# Patient Record
Sex: MALE | Race: WHITE | Employment: OTHER | URBAN - METROPOLITAN AREA
[De-identification: names, ages, dates, MRNs, and addresses within clinical notes are randomized per-mention and may not be internally consistent; named-entity substitution may affect disease eponyms.]

---

## 2018-02-08 LAB
LEFT VENTRICULAR EJECTION FRACTION MODE: NORMAL
LV EF: 65 %

## 2018-02-23 PROBLEM — I48.0 PAF (PAROXYSMAL ATRIAL FIBRILLATION) (HCC): Status: ACTIVE | Noted: 2018-02-23

## 2018-02-23 PROBLEM — E66.09 CLASS 1 OBESITY DUE TO EXCESS CALORIES WITH SERIOUS COMORBIDITY AND BODY MASS INDEX (BMI) OF 32.0 TO 32.9 IN ADULT: Status: ACTIVE | Noted: 2018-02-23

## 2018-02-23 PROBLEM — E66.811 CLASS 1 OBESITY DUE TO EXCESS CALORIES WITH SERIOUS COMORBIDITY AND BODY MASS INDEX (BMI) OF 32.0 TO 32.9 IN ADULT: Status: ACTIVE | Noted: 2018-02-23

## 2018-02-23 PROBLEM — I44.1 AV BLOCK, MOBITZ 1: Status: ACTIVE | Noted: 2018-02-23

## 2018-03-12 DIAGNOSIS — I48.0 PAF (PAROXYSMAL ATRIAL FIBRILLATION) (HCC): ICD-10-CM

## 2018-03-15 ENCOUNTER — TELEPHONE (OUTPATIENT)
Dept: NON INVASIVE DIAGNOSTICS | Age: 73
End: 2018-03-15

## 2018-05-30 ENCOUNTER — OFFICE VISIT (OUTPATIENT)
Dept: NON INVASIVE DIAGNOSTICS | Age: 73
End: 2018-05-30
Payer: MEDICARE

## 2018-05-30 VITALS
WEIGHT: 238.4 LBS | HEART RATE: 73 BPM | SYSTOLIC BLOOD PRESSURE: 130 MMHG | RESPIRATION RATE: 18 BRPM | DIASTOLIC BLOOD PRESSURE: 80 MMHG | HEIGHT: 72 IN | BODY MASS INDEX: 32.29 KG/M2

## 2018-05-30 DIAGNOSIS — I48.0 PAF (PAROXYSMAL ATRIAL FIBRILLATION) (HCC): Primary | ICD-10-CM

## 2018-05-30 DIAGNOSIS — I10 ESSENTIAL HYPERTENSION: ICD-10-CM

## 2018-05-30 DIAGNOSIS — I44.1 WENCKEBACH SECOND DEGREE AV BLOCK: ICD-10-CM

## 2018-05-30 DIAGNOSIS — E66.09 CLASS 1 OBESITY DUE TO EXCESS CALORIES WITH SERIOUS COMORBIDITY AND BODY MASS INDEX (BMI) OF 32.0 TO 32.9 IN ADULT: ICD-10-CM

## 2018-05-30 PROCEDURE — 3017F COLORECTAL CA SCREEN DOC REV: CPT | Performed by: INTERNAL MEDICINE

## 2018-05-30 PROCEDURE — 1123F ACP DISCUSS/DSCN MKR DOCD: CPT | Performed by: INTERNAL MEDICINE

## 2018-05-30 PROCEDURE — 1036F TOBACCO NON-USER: CPT | Performed by: INTERNAL MEDICINE

## 2018-05-30 PROCEDURE — G8417 CALC BMI ABV UP PARAM F/U: HCPCS | Performed by: INTERNAL MEDICINE

## 2018-05-30 PROCEDURE — G8427 DOCREV CUR MEDS BY ELIG CLIN: HCPCS | Performed by: INTERNAL MEDICINE

## 2018-05-30 PROCEDURE — 93000 ELECTROCARDIOGRAM COMPLETE: CPT | Performed by: INTERNAL MEDICINE

## 2018-05-30 PROCEDURE — 4040F PNEUMOC VAC/ADMIN/RCVD: CPT | Performed by: INTERNAL MEDICINE

## 2018-05-30 PROCEDURE — 99213 OFFICE O/P EST LOW 20 MIN: CPT | Performed by: INTERNAL MEDICINE

## 2018-05-30 RX ORDER — AMLODIPINE BESYLATE 5 MG/1
TABLET ORAL
Refills: 3 | COMMUNITY
Start: 2018-05-08 | End: 2019-02-19 | Stop reason: SDUPTHER

## 2018-07-25 ENCOUNTER — OFFICE VISIT (OUTPATIENT)
Dept: CARDIOLOGY CLINIC | Age: 73
End: 2018-07-25
Payer: MEDICARE

## 2018-07-25 VITALS
RESPIRATION RATE: 16 BRPM | WEIGHT: 244 LBS | HEART RATE: 66 BPM | DIASTOLIC BLOOD PRESSURE: 82 MMHG | HEIGHT: 72 IN | BODY MASS INDEX: 33.05 KG/M2 | SYSTOLIC BLOOD PRESSURE: 138 MMHG

## 2018-07-25 DIAGNOSIS — I10 ESSENTIAL HYPERTENSION: ICD-10-CM

## 2018-07-25 DIAGNOSIS — I44.1 AV BLOCK, MOBITZ 1: ICD-10-CM

## 2018-07-25 DIAGNOSIS — I48.0 PAF (PAROXYSMAL ATRIAL FIBRILLATION) (HCC): Primary | ICD-10-CM

## 2018-07-25 PROCEDURE — 1101F PT FALLS ASSESS-DOCD LE1/YR: CPT | Performed by: INTERNAL MEDICINE

## 2018-07-25 PROCEDURE — G8427 DOCREV CUR MEDS BY ELIG CLIN: HCPCS | Performed by: INTERNAL MEDICINE

## 2018-07-25 PROCEDURE — 1036F TOBACCO NON-USER: CPT | Performed by: INTERNAL MEDICINE

## 2018-07-25 PROCEDURE — G8417 CALC BMI ABV UP PARAM F/U: HCPCS | Performed by: INTERNAL MEDICINE

## 2018-07-25 PROCEDURE — 4040F PNEUMOC VAC/ADMIN/RCVD: CPT | Performed by: INTERNAL MEDICINE

## 2018-07-25 PROCEDURE — 3017F COLORECTAL CA SCREEN DOC REV: CPT | Performed by: INTERNAL MEDICINE

## 2018-07-25 PROCEDURE — 93000 ELECTROCARDIOGRAM COMPLETE: CPT | Performed by: INTERNAL MEDICINE

## 2018-07-25 PROCEDURE — 99213 OFFICE O/P EST LOW 20 MIN: CPT | Performed by: INTERNAL MEDICINE

## 2018-07-25 PROCEDURE — 1123F ACP DISCUSS/DSCN MKR DOCD: CPT | Performed by: INTERNAL MEDICINE

## 2018-07-25 RX ORDER — ROSUVASTATIN CALCIUM 5 MG/1
5 TABLET, COATED ORAL
Refills: 5 | COMMUNITY
Start: 2018-07-15

## 2018-07-25 NOTE — PROGRESS NOTES
03/01/2018. Theodore protocol. 6 minutes 36 seconds. 7.0 METS. Resting heart rate 69, peak heart rate 142 (97%). Peak /80. Appropriate heart rate response and low risk stress test.      Review of Systems:  HEENT: negative for acute visual and auditory problems  Constitutional: negative for fever and chills  Respiratory: negative for cough and hemoptysis  Cardiovascular: as per HPI  Gastrointestinal: negative for abdominal pain, diarrhea, nausea and vomiting  Genitourinary: negative for dysuria and hematuria  Derm: negative for rash and skin lesion(s)  Neurological: negative for seizures and tremors  Endocrine: negative for diabetic symptoms including polydipsia and polyuria  Musculoskeletal: negative for CTD  Psychiatric: negative for anxiety and major depression    On exam today, he is a pleasant elderly gentleman in no particular distress. His blood pressure is 138/82. His pulse is 66. His respiration is 16. He weighs 244 pounds. BMI is 33. HEENT: Conjunctiva pink, sclerae anicteric, mucous membranes are moist.  Neck: No JVD could be appreciated. Carotid upstrokes normal.  No bruits. Chest expands normally. No intercostal retractions. Cardiovascular exam reveals normal S1 and soft S2.  Grade 2/6 systolic murmur right upper sternal border. Lungs have good air entry bilaterally without any creps, rhonchi or wheezes. Abdomen is soft, nontender with intact bowel sounds. Extremities have no edema. Distal pulses are normal bilaterally. Skin has no rashes. Neurologically, oriented x3. Psyche, he is pleasant. Back has no tenderness. Muscle tone is normal.    Electrocardiogram, as per my interpretation, reveals sinus rhythm, first degree AV block, old anteroseptal infarct pattern, no acute ST-T changes. Mr. Jessica Molina was in our outpatient office for follow up of his arrhythmias. PAF was noted on the Holter monitor. He is on Eliquis presently and tolerating it well.   He has no significant bleeding issues with it. He does have intermittent high-grade AV block with 2:1 conduction. It occurred predominantly at night on his Holter. He was seen by EP. No acute indication for pacemaker placement presently. Goal is to avoid AV prisca blocking agents. Blood pressure is well controlled. I, therefore, made no medication changes today which include the following:  rosuvastatin (CRESTOR) 5 MG tablet TAKE ONE TABLET BY MOUTH EVERY DAY   apixaban (ELIQUIS) 5 MG TABS tablet Take 1 tablet by mouth 2 times daily   amLODIPine (NORVASC) 5 MG tablet TAKE ONE TABLET BY MOUTH DAILY FOR 90 DAYS   lisinopril (PRINIVIL;ZESTRIL) 20 MG tablet Take 1 tablet by mouth daily   glimepiride (AMARYL) 4 MG tablet Take 4 mg by mouth every morning (before breakfast)   Multiple Vitamins-Minerals (CENTRUM ULTRA MENS) TABS Take 1 tablet by mouth daily    omeprazole (PRILOSEC) 20 MG capsule Take 20 mg by mouth daily   metFORMIN (GLUCOPHAGE) 1000 MG tablet Take 1,000 mg by mouth 2 times daily (with meals). He is scheduled to undergo hernia surgery. Cardiovascularly, he is cleared for it. Eliquis can be stopped safely 3 days prior to the procedure and restarted as soon as surgically cleared. If he remains stable, I'll see him back in our office in six months. Thank you for allowing us to participate in the care of this patient.         STEVE/kajal  [id number]

## 2018-07-25 NOTE — PATIENT INSTRUCTIONS
trans fat  · Read food labels, and try to avoid saturated and trans fats. They increase your risk of heart disease. Trans fat is found in many processed foods such as cookies and crackers. · Use olive or canola oil when you cook. Try cholesterol-lowering spreads, such as Benecol or Take Control. · Bake, broil, grill, or steam foods instead of frying them. · Choose lean meats instead of high-fat meats such as hot dogs and sausages. Cut off all visible fat when you prepare meat. · Eat fish, skinless poultry, and meat alternatives such as soy products instead of high-fat meats. Soy products, such as tofu, may be especially good for your heart. · Choose low-fat or fat-free milk and dairy products. Eat fish  · Eat at least two servings of fish a week. Certain fish, such as salmon and tuna, contain omega-3 fatty acids, which may help reduce your risk of heart attack. Eat foods high in fiber  · Eat a variety of grain products every day. Include whole-grain foods that have lots of fiber and nutrients. Examples of whole-grain foods include oats, whole wheat bread, and brown rice. · Buy whole-grain breads and cereals, instead of white bread or pastries. Limit salt and sodium  · Limit how much salt and sodium you eat to help lower your blood pressure. · Taste food before you salt it. Add only a little salt when you think you need it. With time, your taste buds will adjust to less salt. · Eat fewer snack items, fast foods, and other high-salt, processed foods. Check food labels for the amount of sodium in packaged foods. · Choose low-sodium versions of canned goods (such as soups, vegetables, and beans). Limit sugar  · Limit drinks and foods with added sugar. These include candy, desserts, and soda pop. Limit alcohol  · Limit alcohol to no more than 2 drinks a day for men and 1 drink a day for women. Too much alcohol can cause health problems. When should you call for help?   Watch closely for changes in your Kidney stone on left side

## 2018-07-31 ENCOUNTER — HOSPITAL ENCOUNTER (OUTPATIENT)
Age: 73
Discharge: HOME | End: 2018-08-02

## 2018-07-31 PROCEDURE — 88342 IMHCHEM/IMCYTCHM 1ST ANTB: CPT

## 2018-07-31 PROCEDURE — 88305 TISSUE EXAM BY PATHOLOGIST: CPT

## 2018-07-31 PROCEDURE — 88304 TISSUE EXAM BY PATHOLOGIST: CPT

## 2018-10-26 ENCOUNTER — OFFICE VISIT (OUTPATIENT)
Dept: NON INVASIVE DIAGNOSTICS | Age: 73
End: 2018-10-26
Payer: MEDICARE

## 2018-10-26 VITALS
RESPIRATION RATE: 16 BRPM | HEIGHT: 72 IN | HEART RATE: 77 BPM | DIASTOLIC BLOOD PRESSURE: 84 MMHG | WEIGHT: 240 LBS | BODY MASS INDEX: 32.51 KG/M2 | SYSTOLIC BLOOD PRESSURE: 150 MMHG

## 2018-10-26 DIAGNOSIS — E78.5 HYPERLIPIDEMIA, UNSPECIFIED HYPERLIPIDEMIA TYPE: ICD-10-CM

## 2018-10-26 DIAGNOSIS — I44.1 AV BLOCK, MOBITZ 1: ICD-10-CM

## 2018-10-26 DIAGNOSIS — I48.0 PAF (PAROXYSMAL ATRIAL FIBRILLATION) (HCC): Primary | ICD-10-CM

## 2018-10-26 PROCEDURE — 1036F TOBACCO NON-USER: CPT | Performed by: NURSE PRACTITIONER

## 2018-10-26 PROCEDURE — 1123F ACP DISCUSS/DSCN MKR DOCD: CPT | Performed by: NURSE PRACTITIONER

## 2018-10-26 PROCEDURE — 4040F PNEUMOC VAC/ADMIN/RCVD: CPT | Performed by: NURSE PRACTITIONER

## 2018-10-26 PROCEDURE — 93000 ELECTROCARDIOGRAM COMPLETE: CPT | Performed by: INTERNAL MEDICINE

## 2018-10-26 PROCEDURE — 3017F COLORECTAL CA SCREEN DOC REV: CPT | Performed by: NURSE PRACTITIONER

## 2018-10-26 PROCEDURE — 99213 OFFICE O/P EST LOW 20 MIN: CPT | Performed by: NURSE PRACTITIONER

## 2018-10-26 PROCEDURE — G8427 DOCREV CUR MEDS BY ELIG CLIN: HCPCS | Performed by: NURSE PRACTITIONER

## 2018-10-26 PROCEDURE — G8417 CALC BMI ABV UP PARAM F/U: HCPCS | Performed by: NURSE PRACTITIONER

## 2018-10-26 PROCEDURE — 1101F PT FALLS ASSESS-DOCD LE1/YR: CPT | Performed by: NURSE PRACTITIONER

## 2018-10-26 PROCEDURE — G8484 FLU IMMUNIZE NO ADMIN: HCPCS | Performed by: NURSE PRACTITIONER

## 2018-10-26 NOTE — PROGRESS NOTES
coronary   artery disease. Resting ECG:    Normal sinus rhythm, prolonged DC interval, nonspecific T wave   changes, abnormal EKG.      Exercise:  The patient exercised using a Theodore protocol,   completing 6:36 minutes and reaching an estimated work load of   7.0 metabolic equivalents (METS). Resting HR was 79. Peak   exercise heart rate was 142 ( 97% of maximum predicted heart rate   for age). Baseline /68. Peak exercise /80. The blood pressure response to exercise was normal      Exercise was terminated due to heart rate attained, dyspnea.      The patient experienced no chest pain with exercise.       .           Exercise ECG:   The patient demonstrated occasional PVC's First Degree AV Block    during exercise,      With exercise, there were no ST segment changes of significance   at the heart rate achieved. Hernandez treadmill score was 7 implying low risk. Impression:    1. Exercise EKG was negative. 2. The patient experienced no chest pain with exercise. 3. Hernandez treadmill score was 7 implying low risk.    4. There was an appropriate BP and heart rate response during   exercise and recovery. 5. Exercise capacity was average.    6. Low risk general exercise treadmill test.  7. No prior studies available for comparison. Thank you for sending your patient to this [facility]. Electronically signed by Paulina Frausto MD on 3/1/18 at 4:18 PM    48 hour holter      TTE  2/8/2018:   Findings      Left Ventricle   Left ventricular size is grossly normal with LVH.   Normal systolic function with LVEF estimated at 65%.   There is doppler evidence of stage I diastolic dysfunction.      Right Ventricle   Normal right ventricle structure and function.      Left Atrium   Mildly dilated left atrium by volume index(38ml/m2).      Right Atrium   Normal right atrium.      Mitral Valve   Structurally normal mitral valves.   Mild mitral regurgitation is present.   No

## 2019-02-19 ENCOUNTER — OFFICE VISIT (OUTPATIENT)
Dept: CARDIOLOGY CLINIC | Age: 74
End: 2019-02-19
Payer: MEDICARE

## 2019-02-19 VITALS
HEIGHT: 72 IN | HEART RATE: 74 BPM | DIASTOLIC BLOOD PRESSURE: 90 MMHG | SYSTOLIC BLOOD PRESSURE: 158 MMHG | WEIGHT: 243.4 LBS | BODY MASS INDEX: 32.97 KG/M2 | RESPIRATION RATE: 16 BRPM

## 2019-02-19 DIAGNOSIS — I10 ESSENTIAL HYPERTENSION: Primary | ICD-10-CM

## 2019-02-19 DIAGNOSIS — I10 ESSENTIAL HYPERTENSION: ICD-10-CM

## 2019-02-19 DIAGNOSIS — I48.0 PAF (PAROXYSMAL ATRIAL FIBRILLATION) (HCC): Primary | ICD-10-CM

## 2019-02-19 PROCEDURE — 1036F TOBACCO NON-USER: CPT | Performed by: INTERNAL MEDICINE

## 2019-02-19 PROCEDURE — 93000 ELECTROCARDIOGRAM COMPLETE: CPT | Performed by: INTERNAL MEDICINE

## 2019-02-19 PROCEDURE — 1101F PT FALLS ASSESS-DOCD LE1/YR: CPT | Performed by: INTERNAL MEDICINE

## 2019-02-19 PROCEDURE — G8484 FLU IMMUNIZE NO ADMIN: HCPCS | Performed by: INTERNAL MEDICINE

## 2019-02-19 PROCEDURE — 99213 OFFICE O/P EST LOW 20 MIN: CPT | Performed by: INTERNAL MEDICINE

## 2019-02-19 PROCEDURE — 1123F ACP DISCUSS/DSCN MKR DOCD: CPT | Performed by: INTERNAL MEDICINE

## 2019-02-19 PROCEDURE — G8417 CALC BMI ABV UP PARAM F/U: HCPCS | Performed by: INTERNAL MEDICINE

## 2019-02-19 PROCEDURE — G8427 DOCREV CUR MEDS BY ELIG CLIN: HCPCS | Performed by: INTERNAL MEDICINE

## 2019-02-19 PROCEDURE — 4040F PNEUMOC VAC/ADMIN/RCVD: CPT | Performed by: INTERNAL MEDICINE

## 2019-02-19 PROCEDURE — 3017F COLORECTAL CA SCREEN DOC REV: CPT | Performed by: INTERNAL MEDICINE

## 2019-02-19 RX ORDER — AMLODIPINE BESYLATE 10 MG/1
10 TABLET ORAL DAILY
Qty: 90 TABLET | Refills: 5 | Status: SHIPPED | OUTPATIENT
Start: 2019-02-19 | End: 2019-02-19 | Stop reason: SDUPTHER

## 2019-02-19 RX ORDER — AMLODIPINE BESYLATE 10 MG/1
10 TABLET ORAL DAILY
Qty: 90 TABLET | Refills: 11 | Status: SHIPPED | OUTPATIENT
Start: 2019-02-19 | End: 2019-02-20 | Stop reason: SDUPTHER

## 2019-02-19 RX ORDER — AMLODIPINE BESYLATE 5 MG/1
10 TABLET ORAL DAILY
Qty: 90 TABLET | Refills: 5 | Status: SHIPPED | OUTPATIENT
Start: 2019-02-19 | End: 2019-02-19

## 2019-02-20 DIAGNOSIS — I10 ESSENTIAL HYPERTENSION: ICD-10-CM

## 2019-02-20 RX ORDER — AMLODIPINE BESYLATE 10 MG/1
10 TABLET ORAL DAILY
Qty: 90 TABLET | Refills: 3 | Status: SHIPPED | OUTPATIENT
Start: 2019-02-20 | End: 2019-09-12 | Stop reason: DRUGHIGH

## 2019-02-21 ENCOUNTER — TELEPHONE (OUTPATIENT)
Dept: CARDIOLOGY CLINIC | Age: 74
End: 2019-02-21

## 2019-03-26 ENCOUNTER — TELEPHONE (OUTPATIENT)
Dept: NON INVASIVE DIAGNOSTICS | Age: 74
End: 2019-03-26

## 2019-09-12 ENCOUNTER — OFFICE VISIT (OUTPATIENT)
Dept: CARDIOLOGY CLINIC | Age: 74
End: 2019-09-12
Payer: MEDICARE

## 2019-09-12 VITALS
HEIGHT: 72 IN | DIASTOLIC BLOOD PRESSURE: 72 MMHG | HEART RATE: 60 BPM | RESPIRATION RATE: 20 BRPM | SYSTOLIC BLOOD PRESSURE: 138 MMHG | BODY MASS INDEX: 32.45 KG/M2 | WEIGHT: 239.6 LBS

## 2019-09-12 DIAGNOSIS — E66.9 NON MORBID OBESITY: ICD-10-CM

## 2019-09-12 DIAGNOSIS — K21.9 GASTROESOPHAGEAL REFLUX DISEASE WITHOUT ESOPHAGITIS: ICD-10-CM

## 2019-09-12 DIAGNOSIS — I11.9 HYPERTENSIVE LEFT VENTRICULAR HYPERTROPHY, WITHOUT HEART FAILURE: ICD-10-CM

## 2019-09-12 DIAGNOSIS — R60.0 EDEMA OF BOTH FEET: ICD-10-CM

## 2019-09-12 DIAGNOSIS — I51.89 DIASTOLIC DYSFUNCTION: ICD-10-CM

## 2019-09-12 DIAGNOSIS — I10 ESSENTIAL HYPERTENSION: ICD-10-CM

## 2019-09-12 DIAGNOSIS — E11.9 CONTROLLED TYPE 2 DIABETES MELLITUS WITHOUT COMPLICATION, WITHOUT LONG-TERM CURRENT USE OF INSULIN (HCC): ICD-10-CM

## 2019-09-12 DIAGNOSIS — I48.0 PAF (PAROXYSMAL ATRIAL FIBRILLATION) (HCC): Primary | ICD-10-CM

## 2019-09-12 DIAGNOSIS — I44.1 MOBITZ (TYPE) I (WENCKEBACH'S) ATRIOVENTRICULAR BLOCK: ICD-10-CM

## 2019-09-12 PROCEDURE — 4040F PNEUMOC VAC/ADMIN/RCVD: CPT | Performed by: INTERNAL MEDICINE

## 2019-09-12 PROCEDURE — 99214 OFFICE O/P EST MOD 30 MIN: CPT | Performed by: INTERNAL MEDICINE

## 2019-09-12 PROCEDURE — G8417 CALC BMI ABV UP PARAM F/U: HCPCS | Performed by: INTERNAL MEDICINE

## 2019-09-12 PROCEDURE — 93000 ELECTROCARDIOGRAM COMPLETE: CPT | Performed by: INTERNAL MEDICINE

## 2019-09-12 PROCEDURE — G8427 DOCREV CUR MEDS BY ELIG CLIN: HCPCS | Performed by: INTERNAL MEDICINE

## 2019-09-12 PROCEDURE — 1036F TOBACCO NON-USER: CPT | Performed by: INTERNAL MEDICINE

## 2019-09-12 PROCEDURE — 1123F ACP DISCUSS/DSCN MKR DOCD: CPT | Performed by: INTERNAL MEDICINE

## 2019-09-12 RX ORDER — HYDROCHLOROTHIAZIDE 25 MG/1
25 TABLET ORAL EVERY MORNING
Qty: 30 TABLET | Refills: 6 | Status: SHIPPED | OUTPATIENT
Start: 2019-09-12 | End: 2019-10-09 | Stop reason: SDUPTHER

## 2019-09-12 RX ORDER — AMLODIPINE BESYLATE 5 MG/1
5 TABLET ORAL DAILY
Qty: 90 TABLET | Refills: 3 | Status: SHIPPED
Start: 2019-09-12

## 2019-09-27 ENCOUNTER — TELEPHONE (OUTPATIENT)
Dept: CARDIOLOGY CLINIC | Age: 74
End: 2019-09-27

## 2019-10-09 ENCOUNTER — TELEPHONE (OUTPATIENT)
Dept: CARDIOLOGY CLINIC | Age: 74
End: 2019-10-09

## 2019-10-09 RX ORDER — HYDROCHLOROTHIAZIDE 50 MG/1
50 TABLET ORAL DAILY
Qty: 30 TABLET | Refills: 6 | Status: SHIPPED | OUTPATIENT
Start: 2019-10-09

## 2020-04-13 ENCOUNTER — TELEPHONE (OUTPATIENT)
Dept: NON INVASIVE DIAGNOSTICS | Age: 75
End: 2020-04-13

## 2020-04-13 NOTE — TELEPHONE ENCOUNTER
Patient declined virtual visit options understanding at this time we will not be able to schedule them until after May 26 which is also subject to change. Patient is scheduled 6/25/20 with Dr Yoni Blue.

## 2020-06-16 ENCOUNTER — TELEPHONE (OUTPATIENT)
Dept: NON INVASIVE DIAGNOSTICS | Age: 75
End: 2020-06-16

## 2020-06-25 ENCOUNTER — TELEPHONE (OUTPATIENT)
Dept: NON INVASIVE DIAGNOSTICS | Age: 75
End: 2020-06-25

## 2020-06-25 ENCOUNTER — VIRTUAL VISIT (OUTPATIENT)
Dept: NON INVASIVE DIAGNOSTICS | Age: 75
End: 2020-06-25
Payer: MEDICARE

## 2020-06-25 PROCEDURE — 99214 OFFICE O/P EST MOD 30 MIN: CPT | Performed by: INTERNAL MEDICINE

## 2020-06-25 NOTE — PROGRESS NOTES
mg by mouth every morning (before breakfast)  Historical Provider, MD   Multiple Vitamins-Minerals (CENTRUM ULTRA MENS) TABS Take 1 tablet by mouth daily   Historical Provider, MD   omeprazole (PRILOSEC) 20 MG capsule Take 20 mg by mouth daily  Historical Provider, MD   metFORMIN (GLUCOPHAGE) 1000 MG tablet Take 1,000 mg by mouth 2 times daily (with meals). Historical Provider, MD       Social History     Tobacco Use    Smoking status: Former Smoker     Packs/day: 3.00     Years: 14.00     Pack years: 42.00     Types: Cigarettes     Last attempt to quit: 1977     Years since quittin.0    Smokeless tobacco: Former User     Quit date: 3/1/1977   Substance Use Topics    Alcohol use: Yes     Alcohol/week: 1.0 - 2.0 standard drinks     Types: 1 - 2 Cans of beer per week     Comment: occasional    Drug use: No        PHYSICAL EXAMINATION:   No physical exam due to virtual visit    3/2018 Stress Test  Impression:    1. Exercise EKG was negative. 2. The patient experienced no chest pain with exercise. 3. Hernandez treadmill score was 7 implying low risk.    4. There was an appropriate BP and heart rate response during   exercise and recovery. 5. Exercise capacity was average.    6. Low risk general exercise treadmill test.  7. No prior studies available for comparison    2019 : EKG shows SB, Mobitz 1    2018 TTE   Summary   Left ventricular size is grossly normal with LVH. Normal systolic function with LVEF estimated at 65%. There is doppler evidence of stage I diastolic dysfunction. Mildly dilated left atrium by volume index(38ml/m2). No significant valvular abnormalities. Normal RVSP. ASSESSMENT/PLAN:    1. Abnormal EKG    2. Mobitz type 1 second degree atrioventricular block    3. Essential hypertension    4. Hyperlipidemia, unspecified hyperlipidemia type    5.  PAF (paroxysmal atrial fibrillation) ([facility])    6. Class 1 obesity due to excess calories with serious comorbidity and body mass waiver authority and the [employer] Act, this Virtual Visit was conducted with patient's (and/or legal guardian's) consent, to reduce the patient's risk of exposure to COVID-19 and provide necessary medical care. The patient (and/or legal guardian) has also been advised to contact this office for worsening conditions or problems, and seek emergency medical treatment and/or call 911 if deemed necessary. Services were provided through a phone/video synchronous discussion virtually to substitute for in-person clinic visit. Patient and provider were located at their individual homes. --Juan Carlos Morales MD on 6/25/2020 at 9:48 AM    An electronic signature was used to authenticate this note.

## 2020-06-26 ENCOUNTER — NURSE ONLY (OUTPATIENT)
Dept: NON INVASIVE DIAGNOSTICS | Age: 75
End: 2020-06-26

## 2020-06-26 VITALS — TEMPERATURE: 98.2 F

## 2021-07-08 ENCOUNTER — OFFICE VISIT (OUTPATIENT)
Dept: NON INVASIVE DIAGNOSTICS | Age: 76
End: 2021-07-08
Payer: MEDICARE

## 2021-07-08 VITALS
RESPIRATION RATE: 16 BRPM | WEIGHT: 239.8 LBS | HEART RATE: 87 BPM | SYSTOLIC BLOOD PRESSURE: 126 MMHG | DIASTOLIC BLOOD PRESSURE: 86 MMHG | OXYGEN SATURATION: 96 % | BODY MASS INDEX: 32.48 KG/M2 | HEIGHT: 72 IN

## 2021-07-08 DIAGNOSIS — I48.0 PAF (PAROXYSMAL ATRIAL FIBRILLATION) (HCC): Primary | ICD-10-CM

## 2021-07-08 DIAGNOSIS — R53.83 FATIGUE, UNSPECIFIED TYPE: ICD-10-CM

## 2021-07-08 DIAGNOSIS — R94.31 ABNORMAL EKG: ICD-10-CM

## 2021-07-08 DIAGNOSIS — I44.1 AV BLOCK, MOBITZ 1: ICD-10-CM

## 2021-07-08 PROCEDURE — G8417 CALC BMI ABV UP PARAM F/U: HCPCS | Performed by: INTERNAL MEDICINE

## 2021-07-08 PROCEDURE — 93000 ELECTROCARDIOGRAM COMPLETE: CPT | Performed by: INTERNAL MEDICINE

## 2021-07-08 PROCEDURE — G8427 DOCREV CUR MEDS BY ELIG CLIN: HCPCS | Performed by: INTERNAL MEDICINE

## 2021-07-08 PROCEDURE — 1036F TOBACCO NON-USER: CPT | Performed by: INTERNAL MEDICINE

## 2021-07-08 PROCEDURE — 99214 OFFICE O/P EST MOD 30 MIN: CPT | Performed by: INTERNAL MEDICINE

## 2021-07-08 PROCEDURE — 4040F PNEUMOC VAC/ADMIN/RCVD: CPT | Performed by: INTERNAL MEDICINE

## 2021-07-08 PROCEDURE — 1123F ACP DISCUSS/DSCN MKR DOCD: CPT | Performed by: INTERNAL MEDICINE

## 2021-07-08 NOTE — PROGRESS NOTES
Cardiac Electrophysiology Outpatient Progress Note    Dorain Merritt  1945  Date of Service: 2021  Referring Provider/PCP: Teressa Muñoz MD  Chief Complaint: PAF    HISTORY OF PRESENT ILLNESS    Dorian Merritt presents to the office today for follow up of these Electrophysiology conditions: PAF. This is a very pleasant 66 y.o. male with PAF diagnosed 2018 on holter monitor. No cardioversion or ablations. 2021: Mr. Wesley Bran presents today for follow up. He is asymptomatic and feels overall well from a EP POV. He does get some leg fatigue and sporadic dizziness. No palpitations. He is maintained on Eliquis, no bleeding issues. He presents today in SR. The patient denies any chest pain, dyspnea, palpitations,  syncope, orthopnea or paroxysmal nocturnal dyspnea. Social History     Socioeconomic History    Marital status:      Spouse name: Not on file    Number of children: Not on file    Years of education: Not on file    Highest education level: Not on file   Occupational History    Not on file   Tobacco Use    Smoking status: Former Smoker     Packs/day: 3.00     Years: 14.00     Pack years: 42.00     Types: Cigarettes     Quit date: 1977     Years since quittin.1    Smokeless tobacco: Former User     Quit date: 3/1/1977   Vaping Use    Vaping Use: Never used   Substance and Sexual Activity    Alcohol use: Yes     Alcohol/week: 1.0 - 2.0 standard drinks     Types: 1 - 2 Cans of beer per week     Comment: occasional    Drug use: No    Sexual activity: Not on file   Other Topics Concern    Not on file   Social History Narrative    5-6 cups decaf coffee daily; caff free diet coke     Social Determinants of Health     Financial Resource Strain:     Difficulty of Paying Living Expenses:    Food Insecurity:     Worried About Running Out of Food in the Last Year:     Ran Out of Food in the Last Year:    Transportation Needs:     Lack of Transportation (Medical):      Lack of Transportation (Non-Medical):    Physical Activity:     Days of Exercise per Week:     Minutes of Exercise per Session:    Stress:     Feeling of Stress :    Social Connections:     Frequency of Communication with Friends and Family:     Frequency of Social Gatherings with Friends and Family:     Attends Bahai Services:     Active Member of Clubs or Organizations:     Attends Club or Organization Meetings:     Marital Status:    Intimate Partner Violence:     Fear of Current or Ex-Partner:     Emotionally Abused:     Physically Abused:     Sexually Abused:          Patient Active Problem List    Diagnosis Date Noted    Hyperlipidemia 10/26/2018    [facility]    Class 1 obesity due to excess calories with serious comorbidity and body mass index (BMI) of 32.0 to 32.9 in adult 2018    AV block, Mobitz 1 2018    BPH (benign prostatic hyperplasia) 2015    Prostatitis 2015    Essential hypertension 2015    Type II diabetes mellitus with HbA1C goal to be determined ([facility]        Family History   Problem Relation Age of Onset    Diabetes Mother     High Blood Pressure Mother     Stroke Mother 72    Diabetes Father     Other Father 67        CHF    Stroke Father     Early Death Sister     Cancer Brother         bladder    No Known Problems Sister        SOCIAL HISTORY   Social History     Socioeconomic History    Marital status:      Spouse name: Not on file    Number of children: Not on file    Years of education: Not on file    Highest education level: Not on file   Occupational History    Not on file   Tobacco Use    Smoking status: Former Smoker     Packs/day: 3.00     Years: 14.00     Pack years: 42.00     Types: Cigarettes     Quit date: 1977     Years since quittin.1    Smokeless tobacco: Former User     Quit date: 3/1/1977   Vaping Use    Vaping Use: Never used   Substance and Sexual Activity    Alcohol use: Yes     Alcohol/week: 1.0 - 2.0 standard drinks     Types: 1 - 2 Cans of beer per week     Comment: occasional    Drug use: No    Sexual activity: Not on file   Other Topics Concern    Not on file   Social History Narrative    5-6 cups decaf coffee daily; caff free diet coke     Social Determinants of Health     Financial Resource Strain:     Difficulty of Paying Living Expenses:    Food Insecurity:     Worried About Running Out of Food in the Last Year:     Ran Out of Food in the Last Year:    Transportation Needs:     Lack of Transportation (Medical):      Lack of Transportation (Non-Medical):    Physical Activity:     Days of Exercise per Week:     Minutes of Exercise per Session:    Stress:     Feeling of Stress :    Social Connections:     Frequency of Communication with Friends and Family:     Frequency of Social Gatherings with Friends and Family:     Attends Yazidi Services:     Active Member of Clubs or Organizations:     Attends Club or Organization Meetings:     Marital Status:    Intimate Partner Violence:     Fear of Current or Ex-Partner:     Emotionally Abused:     Physically Abused:     Sexually Abused:         Past Surgical History:   Procedure Laterality Date    COLONOSCOPY  3/14/14    ENDOSCOPY, COLON, DIAGNOSTIC      HERNIA REPAIR  01/12/11    KNEE ARTHROPLASTY  02/15/2010    KNEE SURGERY Left     POLYPECTOMY  4/28/11    UPPER GASTROINTESTINAL ENDOSCOPY  4/28/11       Current Outpatient Medications   Medication Sig Dispense Refill    hydrochlorothiazide (HYDRODIURIL) 50 MG tablet Take 1 tablet by mouth daily 30 tablet 6    amLODIPine (NORVASC) 5 MG tablet Take 1 tablet by mouth daily 90 tablet 3    apixaban (ELIQUIS) 5 MG TABS tablet Take 1 tablet by mouth 2 times daily 60 tablet 0    lisinopril (PRINIVIL;ZESTRIL) 20 MG tablet TAKE ONE TABLET BY MOUTH EVERY DAY (Patient taking differently: TAKE ONE TABLET BY MOUTH BID) 30 tablet 5    rosuvastatin (CRESTOR) 5 MG tablet Take 5 mg by mouth three times a week   5    glimepiride (AMARYL) 4 MG tablet Take 4 mg by mouth every morning (before breakfast)      Multiple Vitamins-Minerals (CENTRUM ULTRA MENS) TABS Take 1 tablet by mouth daily       omeprazole (PRILOSEC) 20 MG capsule Take 20 mg by mouth daily      metFORMIN (GLUCOPHAGE) 1000 MG tablet Take 1,000 mg by mouth 2 times daily (with meals). No current facility-administered medications for this visit. No Known Allergies        ROS:   Review of Systems        PHYSICAL EXAM:  Vitals:    07/08/21 1042   BP: 126/86   Pulse: 87   Resp: 16   SpO2: 96%   Weight: 239 lb 12.8 oz (108.8 kg)   Height: 6' (1.829 m)     Constitutional: Oriented to person, place, and time. Well-developed and cooperative. Head: Normocephalic and atraumatic. Eyes: Conjunctivae are normal.  Neck: No  JVD present. Cardiovascular: S1 normal, S2 normal  A regular rhythm present. Pulmonary/Chest: Effort normal and breath sounds normal. No respiratory distress. Abdominal: Soft. Normal appearance   Musculoskeletal: Normal range of motion of all extremities, no muscle weakness. Neurological: Alert and oriented to person, place, and time. Skin: Skin is warm and dry. No bruising, no ecchymosis and no rash noted. Extremity: No clubbing or cyanosis. No edema. Psychiatric: Normal mood and affect.  Thought content normal.         Pertinent Labs:    CBC:   WBC (E9/L)   Date Value   11/03/2015 12.7 (H)   10/09/2014 6.6   10/08/2014 11.1     Hemoglobin (g/dL)   Date Value   11/03/2015 13.9   10/09/2014 12.4 (L)   10/08/2014 13.3     Hematocrit (%)   Date Value   11/03/2015 42.0   10/09/2014 36.4 (L)   10/08/2014 38.1     Platelets (C0/X)   Date Value   11/03/2015 119 (L)   10/09/2014 122 (L)   10/08/2014 106 (L)      BMP:   Sodium (mmol/L)   Date Value   11/03/2015 133   10/09/2014 138   10/08/2014 137     Potassium (mmol/L)   Date Value   11/03/2015 3.7 10/09/2014 3.6   10/08/2014 3.3 (L)     Chloride (mmol/L)   Date Value   11/03/2015 95 (L)   10/09/2014 103   10/08/2014 102     CO2 (mmol/L)   Date Value   11/03/2015 21 (L)   10/09/2014 24   10/08/2014 26     BUN (mg/dL)   Date Value   11/03/2015 19   10/09/2014 15   10/08/2014 15     CREATININE (mg/dL)   Date Value   11/03/2015 1.0   10/09/2014 0.9   10/08/2014 0.9     Glucose (mg/dL)   Date Value   11/03/2015 240 (H)   11/03/2015 226   10/09/2014 147 (H)     Calcium (mg/dL)   Date Value   11/03/2015 8.9   10/09/2014 8.5 (L)   10/08/2014 8.7      INR: No results found for: INR   BNP: No results found for: BNP   TSH:   TSH (uIU/mL)   Date Value   10/08/2014 3.190      Cardiac Injury Profile:   Troponin (ng/mL)   Date Value   11/03/2015 <0.01     Lipid Profile: No results found for: TRIG, HDL, LDLCALC, CHOL   Hemoglobin A1C:   Hemoglobin A1C (%)   Date Value   11/04/2015 7.0 (H)       Pertinent Cardiac Testing:     3/2018 Stress Test  Impression:    1. Exercise EKG was negative. 2. The patient experienced no chest pain with exercise. 3. Hernandez treadmill score was 7 implying low risk.    4. There was an appropriate BP and heart rate response during   exercise and recovery. 5. Exercise capacity was average.    6. Low risk general exercise treadmill test.  7. No prior studies available for comparison       2/2018 TTE   Summary   Left ventricular size is grossly normal with LVH.   Normal systolic function with LVEF estimated at 65%.   There is doppler evidence of stage I diastolic dysfunction.   Mildly dilated left atrium by volume index(38ml/m2).   No significant valvular abnormalities.   Normal RVSP. 24 hour HM: 6/2020:  has 2:1 av block mostly at night. He is asymptomatic. I discussed pacemaker but he prefers to monitor for now as he is asymptomatic. Monitor is not that much different from last one 2 yrs ago.  Cont to monitor       ECG 7/8/2021: normal sinus rhythm, LAFB, rate: 87 bpm - see scanned cardiology     I have independently reviewed all of the ECGs and rhythm strips per above    I have personally reviewed the laboratory, cardiac diagnostic and radiographic testing as outlined above: We have requested previous records. 1. PAF (paroxysmal atrial fibrillation) ([address]    2. Abnormal EKG    3. AV block, Mobitz 1    4. Fatigue, unspecified type         Assessment & Plan    . Paroxysmal atrial fibrillation  - PAF revealed on 48 hour holter monitor on 01/25/2018  - CHADSVASC= 3 (Age, HTN, DM)  - continue Eliquis 5 mg BID- tolerating well with no bleeding concerns  - asymptomatic in terms of PAF but has some fatigue which he attributes to old age  - Continues to defer pacemaker as he feels well and declines sleep study  - Re-education on importance of well controlled HTN (goal BP < 130/80), adequate weight control (goal BMI of < 27), encouraged physical activity consisting of moderate cardiopulmonary exercise up to a goal of 250 min/wk,  consider sleep study regarding a formal evaluation for sleep apnea, smoking cessation and limited ETOH intake.     2. Mobitz Type 1   - 48 hour Holter monitor on 01/25/2018 documented this  - denies dizziness/syncope c/w angelika-arrhythmias, has some fatigue which is nonspecific  - Stress 3/1/18: no ischemia, normal conduction with stress, no overt worsening of conduction, Ex time 6:36  - will continue to monitor off AVN blocking agents at this time   - 6/30/2020: 24 hour HM:  2:1 av block mostly at night. He is asymptomatic. I discussed pacemaker but he prefers to monitor for now as he is asymptomatic     3. Essential Hypertension  -Cont follow up with PCP  - continue ACE + amlodipine + HCTZ  - follow up with Dr. Levi Gregory     4. DM  - monitored by his PCP      5. Hyperlipidemia   - statin therapy   - monitored by PCP        Plan   1. Return for 1 yr OV NO device No AAD.   2. He wishes to defer sleep apnea workup, he states he will discuss with his PCP.     Thank you for allowing me to participate in your patient's care.     Daniel White MD  Cardiac Electrophysiology  [address] [place] [address]

## 2022-11-21 ENCOUNTER — TELEPHONE (OUTPATIENT)
Dept: CARDIOLOGY CLINIC | Age: 77
End: 2022-11-21

## 2022-11-29 ENCOUNTER — OFFICE VISIT (OUTPATIENT)
Dept: CARDIOLOGY CLINIC | Age: 77
End: 2022-11-29
Payer: MEDICARE

## 2022-11-29 VITALS
DIASTOLIC BLOOD PRESSURE: 70 MMHG | RESPIRATION RATE: 16 BRPM | SYSTOLIC BLOOD PRESSURE: 112 MMHG | WEIGHT: 232 LBS | HEART RATE: 90 BPM | HEIGHT: 72 IN | BODY MASS INDEX: 31.42 KG/M2

## 2022-11-29 DIAGNOSIS — Z01.810 PREOP CARDIOVASCULAR EXAM: ICD-10-CM

## 2022-11-29 DIAGNOSIS — G89.29 CHRONIC PAIN OF RIGHT KNEE: ICD-10-CM

## 2022-11-29 DIAGNOSIS — I10 ESSENTIAL HYPERTENSION: ICD-10-CM

## 2022-11-29 DIAGNOSIS — I44.1 AV BLOCK, MOBITZ 1: ICD-10-CM

## 2022-11-29 DIAGNOSIS — I48.0 PAF (PAROXYSMAL ATRIAL FIBRILLATION) (HCC): Primary | ICD-10-CM

## 2022-11-29 DIAGNOSIS — M25.561 CHRONIC PAIN OF RIGHT KNEE: ICD-10-CM

## 2022-11-29 PROCEDURE — G8484 FLU IMMUNIZE NO ADMIN: HCPCS | Performed by: INTERNAL MEDICINE

## 2022-11-29 PROCEDURE — 1123F ACP DISCUSS/DSCN MKR DOCD: CPT | Performed by: INTERNAL MEDICINE

## 2022-11-29 PROCEDURE — G8427 DOCREV CUR MEDS BY ELIG CLIN: HCPCS | Performed by: INTERNAL MEDICINE

## 2022-11-29 PROCEDURE — 93000 ELECTROCARDIOGRAM COMPLETE: CPT | Performed by: INTERNAL MEDICINE

## 2022-11-29 PROCEDURE — 99214 OFFICE O/P EST MOD 30 MIN: CPT | Performed by: INTERNAL MEDICINE

## 2022-11-29 PROCEDURE — G8417 CALC BMI ABV UP PARAM F/U: HCPCS | Performed by: INTERNAL MEDICINE

## 2022-11-29 PROCEDURE — 1036F TOBACCO NON-USER: CPT | Performed by: INTERNAL MEDICINE

## 2022-11-29 PROCEDURE — 3078F DIAST BP <80 MM HG: CPT | Performed by: INTERNAL MEDICINE

## 2022-11-29 PROCEDURE — 3074F SYST BP LT 130 MM HG: CPT | Performed by: INTERNAL MEDICINE

## 2022-11-29 RX ORDER — ALLOPURINOL 100 MG/1
TABLET ORAL
COMMUNITY

## 2022-11-29 NOTE — PROGRESS NOTES
OFFICE VISIT     PRIMARY CARE PHYSICIAN:      Gaby Rucker MD       ALLERGIES / SENSITIVITIES:      No Known Allergies       REVIEWED MEDICATIONS:        Current Outpatient Medications:     allopurinol (ZYLOPRIM) 100 MG tablet, allopurinol 100 mg tablet  Take 2 tablets every day by oral route., Disp: , Rfl:     amLODIPine (NORVASC) 5 MG tablet, Take 1 tablet by mouth daily, Disp: 90 tablet, Rfl: 3    apixaban (ELIQUIS) 5 MG TABS tablet, Take 1 tablet by mouth 2 times daily, Disp: 60 tablet, Rfl: 0    lisinopril (PRINIVIL;ZESTRIL) 20 MG tablet, TAKE ONE TABLET BY MOUTH EVERY DAY, Disp: 30 tablet, Rfl: 5    glimepiride (AMARYL) 4 MG tablet, Take 4 mg by mouth every morning (before breakfast), Disp: , Rfl:     Multiple Vitamins-Minerals (CENTRUM ULTRA MENS) TABS, Take 1 tablet by mouth daily , Disp: , Rfl:     omeprazole (PRILOSEC) 20 MG capsule, Take 20 mg by mouth daily, Disp: , Rfl:     metFORMIN (GLUCOPHAGE) 1000 MG tablet, Take 1,000 mg by mouth 2 times daily (with meals). , Disp: , Rfl:     hydrochlorothiazide (HYDRODIURIL) 50 MG tablet, Take 1 tablet by mouth daily (Patient not taking: Reported on 11/29/2022), Disp: 30 tablet, Rfl: 6    rosuvastatin (CRESTOR) 5 MG tablet, Take 5 mg by mouth three times a week  (Patient not taking: Reported on 11/29/2022), Disp: , Rfl: 5      S: REASON FOR VISIT:       Chief Complaint   Patient presents with    Atrial Fibrillation     CC for knee surgery 12/07. History of Present Illness:       Office Visit for follow up of A Fib, HTN, preop clearance   76year old with history of A fib, HTN came for preop clearance   Last seen in 9/2019 - Previous Pt of Dr Ayaan Dawson. Needs Right  knee surgery   Occ dizzy when turn quickly,no syncope   No hospitalizations or surgeries since last visit   Patient is compliant with all medications   Conner any exertional chest pain or short of breath   No palpitations or syncope.    Fairly active at home, uses cane due to right knee   Try to watch diet          Past Medical History:   Diagnosis Date    Arthritis     Atrial fibrillation (HCC)     Alonzo's esophagus     BPH (benign prostatic hypertrophy)     Diverticulosis     Gastritis     GERD (gastroesophageal reflux disease)     H/O non-insulin dependent diabetes mellitus     Hemorrhoids, internal     Hernia, hiatal     HTN (hypertension)     Hyperlipidemia     Hypertension     Hypoxemia requiring supplemental oxygen     Mobitz type 1 second degree atrioventricular block     Nonalcoholic fatty liver disease     Obesity     Pneumonia     Polyp of colon     Sleep apnea     Type II diabetes mellitus with HbA1C goal to be determined [facility])             Past Surgical History:   Procedure Laterality Date    COLONOSCOPY  3/14/14    ENDOSCOPY, COLON, DIAGNOSTIC      HERNIA REPAIR  11    KNEE ARTHROPLASTY  02/15/2010    KNEE SURGERY Left     POLYPECTOMY  11    UPPER GASTROINTESTINAL ENDOSCOPY  11          Family History   Problem Relation Age of Onset    Diabetes Mother     High Blood Pressure Mother     Stroke Mother 72    Diabetes Father     Other Father 67        CHF    Stroke Father     Early Death Sister     Cancer Brother         bladder    No Known Problems Sister           Social History     Tobacco Use    Smoking status: Former     Packs/day: 3.00     Years: 14.00     Pack years: 42.00     Types: Cigarettes     Quit date: 1977     Years since quittin.5    Smokeless tobacco: Former     Quit date: 3/1/1977   Substance Use Topics    Alcohol use:  Yes     Alcohol/week: 1.0 - 2.0 standard drink     Types: 1 - 2 Cans of beer per week     Comment: occasional         Review of Systems:    Constitutional: negative for fever and chills, or significant weight loss  HEENT: negative for acute visual symptoms or auditory problems, no dysphagia  Respiratory: negative for cough, wheezing, or hemoptysis  Cardiovascular: negative for chest pain, palpitations, and dyspnea  Gastrointestinal: negative for abdominal pain, diarrhea, melena, nausea and vomiting  Endocrine: Negative for polyuria and polydyspsia  Genitourinary: negative for dysuria and hematuria  Derm: negative for rash and skin lesion(s)  Neurological: negative for tingling, numbness, weakness, seizures  Endocrine: negative for polydipsia and polyuria  Musculoskeletal: Right knee pain+   Psychiatric: negative for anxiety, depression, or suicidal ideations         O:  COMPLETE PHYSICAL EXAM:       /70   Pulse 90   Resp 16   Ht 6' (1.829 m)   Wt 232 lb (105.2 kg)   BMI 31.46 kg/m²       General:   Patient alert, comfortable, no distress. Appears stated age. HEENT:    Pupils equal, no icterus; Tongue moist.   Neck:              No masses, Thyroid not palpable. No elevated JVD, No carotid bruit. Chest:   Normal configuration, non tender. Lungs:   Clear to auscultation bilaterally except few scattered rhonchi. Cardiovascular:  Regular rhythm, 1/6 systolic murmur, No S3, no palpable thrills. Abdomen:  Soft, Bowel sounds normal, no pulsatile abdominal aorta, no palpable masses. Extremities:  No edema. Distal pulses palpable. No cyanosis, no clubbing. Skin:   Good turgor, warm and dry, no cyanosis. Musculoskeletal: No joint swelling or deformity. Neuro:   Cranial nerves grossly intact; No focal neurologic deficit. Psych:   Alert, good mood and effect. REVIEW OF DIAGNOSTIC TESTS:        Electrocardiogram: Reviewed     Treadmill Stress Test - March 1, 2018  Impression:     1. Exercise EKG was negative. 2. The patient experienced no chest pain with exercise. 3. Hernandez treadmill score was 7 implying low risk. 4. There was an appropriate BP and heart rate response during   exercise and recovery. 5. Exercise capacity was average. 6. Low risk general exercise treadmill test.   7. No prior studies available for comparison. Echo 2/8/2018   Summary   Left ventricular size is grossly normal with LVH. Normal systolic function with LVEF estimated at 65%. There is doppler evidence of stage I diastolic dysfunction. Mildly dilated left atrium by volume index(38ml/m2). No significant valvular abnormalities. Normal RVSP. ASSESSMENT / PLAN:    Gloria William was seen today for atrial fibrillation. Diagnoses and all orders for this visit:    Preop cardiovascular exam - No CP; No CHF, No ischemia on EKG. BP and HR stable. Further testing will not change his management, Cleared for his knee surgery - He is agreeable. Send letter of clearance. PAF (paroxysmal atrial fibrillation) ([facility] [address] - Dx 2/2018 - [id number] VASc score 4, On Eliquis, Seen by [email or web address] EP    -     EKG 12 Lead     Essential hypertension - Controlled     Mobitz (type) I (Wenckebach's) atrioventricular block - Stable; Noted on Holter June 2020 - Intermittent 2:1 block at night, seen by [email or web address] EP-Dr Britney Hernandez, d/w him about pacemaker, he wanted to monitor closely     Controlled type 2 diabetes mellitus without complication, without long-term current use of insulin ([facility] [address]     Non morbid obesity - Diet, exercise and weight loss discussed. Edema feet  - Decrease Amlodipine, add HCTZ for BP and edema     Hypertensive left ventricular hypertrophy, without heart failure     Diastolic dysfunction     Gastroesophageal reflux disease without esophagitis    Chronic pain of right knee     Preventive Cardiology: Low cholesterol diet, regular exercise as tolerate, and gradual weight loss discussed. Above recommendations discussed with him . The patient's current medication list, allergies, problem list and results of prior tests (as available) were reviewed at today's visit   All questions answered about cardiac diagnoses and cardiac medications. Continue current medications. Monitor BP and heart rates. Compliance with medications and f/u with all physicians discussed. Risk factor modification based on risk profile discussed.    Call if any exertional chest pain, short of breath, dizzy or palpitations. Follow up in 6 months or earlier if needed.          [id number] [facility]  [address], [place], [address] [facility]  [phone]

## 2022-12-07 ENCOUNTER — TELEPHONE (OUTPATIENT)
Dept: ADMINISTRATIVE | Age: 77
End: 2022-12-07

## 2022-12-07 NOTE — TELEPHONE ENCOUNTER
Erik Barrientos with [facility] calling for HFU apt/timing with Dr. Benson Saldivar discharging today dx: arrhythmia while there for a total knee. She is going to fax recs to the [place] office. Please call to schedule HFU when schedule is available. Thank you.

## 2023-02-18 ENCOUNTER — APPOINTMENT (OUTPATIENT)
Dept: GENERAL RADIOLOGY | Age: 78
DRG: 689 | End: 2023-02-18
Payer: MEDICARE

## 2023-02-18 ENCOUNTER — APPOINTMENT (OUTPATIENT)
Dept: CT IMAGING | Age: 78
DRG: 689 | End: 2023-02-18
Payer: MEDICARE

## 2023-02-18 ENCOUNTER — HOSPITAL ENCOUNTER (INPATIENT)
Age: 78
LOS: 3 days | Discharge: HOME | DRG: 689 | End: 2023-02-21
Attending: EMERGENCY MEDICINE | Admitting: INTERNAL MEDICINE
Payer: MEDICARE

## 2023-02-18 DIAGNOSIS — R41.82 ALTERED MENTAL STATUS, UNSPECIFIED ALTERED MENTAL STATUS TYPE: ICD-10-CM

## 2023-02-18 DIAGNOSIS — N17.9 AKI (ACUTE KIDNEY INJURY) (HCC): ICD-10-CM

## 2023-02-18 DIAGNOSIS — I10 ESSENTIAL HYPERTENSION: ICD-10-CM

## 2023-02-18 DIAGNOSIS — J18.9 PNEUMONIA DUE TO INFECTIOUS ORGANISM, UNSPECIFIED LATERALITY, UNSPECIFIED PART OF LUNG: Primary | ICD-10-CM

## 2023-02-18 DIAGNOSIS — N30.00 ACUTE CYSTITIS WITHOUT HEMATURIA: ICD-10-CM

## 2023-02-18 PROBLEM — N39.0 UTI (URINARY TRACT INFECTION): Status: ACTIVE | Noted: 2023-02-18

## 2023-02-18 LAB
ALBUMIN SERPL-MCNC: 3.9 G/DL (ref 3.5–5.2)
ALP BLD-CCNC: 64 U/L (ref 40–129)
ALT SERPL-CCNC: 21 U/L (ref 0–40)
ANION GAP SERPL CALCULATED.3IONS-SCNC: 10 MMOL/L (ref 7–16)
AST SERPL-CCNC: 20 U/L (ref 0–39)
BACTERIA: ABNORMAL /HPF
BASOPHILS ABSOLUTE: 0.02 E9/L (ref 0–0.2)
BASOPHILS RELATIVE PERCENT: 0.2 % (ref 0–2)
BILIRUB SERPL-MCNC: 0.5 MG/DL (ref 0–1.2)
BILIRUBIN URINE: NEGATIVE
BLOOD, URINE: ABNORMAL
BUN BLDV-MCNC: 23 MG/DL (ref 6–23)
CALCIUM SERPL-MCNC: 9.2 MG/DL (ref 8.6–10.2)
CHLORIDE BLD-SCNC: 100 MMOL/L (ref 98–107)
CLARITY: ABNORMAL
CO2: 24 MMOL/L (ref 22–29)
COLOR: YELLOW
CREAT SERPL-MCNC: 1.4 MG/DL (ref 0.7–1.2)
EOSINOPHILS ABSOLUTE: 0.04 E9/L (ref 0.05–0.5)
EOSINOPHILS RELATIVE PERCENT: 0.3 % (ref 0–6)
GFR SERPL CREATININE-BSD FRML MDRD: 51 ML/MIN/1.73
GLUCOSE BLD-MCNC: 251 MG/DL (ref 74–99)
GLUCOSE URINE: NEGATIVE MG/DL
HCT VFR BLD CALC: 35.3 % (ref 37–54)
HEMOGLOBIN: 11.8 G/DL (ref 12.5–16.5)
IMMATURE GRANULOCYTES #: 0.07 E9/L
IMMATURE GRANULOCYTES %: 0.5 % (ref 0–5)
INFLUENZA A BY PCR: NOT DETECTED
INFLUENZA B BY PCR: NOT DETECTED
KETONES, URINE: 15 MG/DL
LEUKOCYTE ESTERASE, URINE: ABNORMAL
LYMPHOCYTES ABSOLUTE: 0.85 E9/L (ref 1.5–4)
LYMPHOCYTES RELATIVE PERCENT: 6.7 % (ref 20–42)
MCH RBC QN AUTO: 33.1 PG (ref 26–35)
MCHC RBC AUTO-ENTMCNC: 33.4 % (ref 32–34.5)
MCV RBC AUTO: 99.2 FL (ref 80–99.9)
METER GLUCOSE: 255 MG/DL (ref 74–99)
MONOCYTES ABSOLUTE: 1.01 E9/L (ref 0.1–0.95)
MONOCYTES RELATIVE PERCENT: 7.9 % (ref 2–12)
NEUTROPHILS ABSOLUTE: 10.77 E9/L (ref 1.8–7.3)
NEUTROPHILS RELATIVE PERCENT: 84.4 % (ref 43–80)
NITRITE, URINE: POSITIVE
PDW BLD-RTO: 13.6 FL (ref 11.5–15)
PH UA: 5.5 (ref 5–9)
PLATELET # BLD: 159 E9/L (ref 130–450)
PMV BLD AUTO: 11.3 FL (ref 7–12)
POTASSIUM SERPL-SCNC: 4.6 MMOL/L (ref 3.5–5)
PROTEIN UA: 30 MG/DL
RBC # BLD: 3.56 E12/L (ref 3.8–5.8)
RBC UA: ABNORMAL /HPF (ref 0–2)
SARS-COV-2, NAAT: NOT DETECTED
SODIUM BLD-SCNC: 134 MMOL/L (ref 132–146)
SPECIFIC GRAVITY UA: >=1.03 (ref 1–1.03)
TOTAL PROTEIN: 7.3 G/DL (ref 6.4–8.3)
TROPONIN, HIGH SENSITIVITY: 34 NG/L (ref 0–11)
UROBILINOGEN, URINE: 1 E.U./DL
WBC # BLD: 12.8 E9/L (ref 4.5–11.5)
WBC UA: >20 /HPF (ref 0–5)

## 2023-02-18 PROCEDURE — 87077 CULTURE AEROBIC IDENTIFY: CPT

## 2023-02-18 PROCEDURE — 85025 COMPLETE CBC W/AUTO DIFF WBC: CPT

## 2023-02-18 PROCEDURE — 71045 X-RAY EXAM CHEST 1 VIEW: CPT

## 2023-02-18 PROCEDURE — 2140000000 HC CCU INTERMEDIATE R&B

## 2023-02-18 PROCEDURE — 84484 ASSAY OF TROPONIN QUANT: CPT

## 2023-02-18 PROCEDURE — 87502 INFLUENZA DNA AMP PROBE: CPT

## 2023-02-18 PROCEDURE — 87088 URINE BACTERIA CULTURE: CPT

## 2023-02-18 PROCEDURE — 93005 ELECTROCARDIOGRAM TRACING: CPT | Performed by: EMERGENCY MEDICINE

## 2023-02-18 PROCEDURE — 72125 CT NECK SPINE W/O DYE: CPT

## 2023-02-18 PROCEDURE — 6360000002 HC RX W HCPCS: Performed by: EMERGENCY MEDICINE

## 2023-02-18 PROCEDURE — 99285 EMERGENCY DEPT VISIT HI MDM: CPT

## 2023-02-18 PROCEDURE — 82962 GLUCOSE BLOOD TEST: CPT

## 2023-02-18 PROCEDURE — 87186 SC STD MICRODIL/AGAR DIL: CPT

## 2023-02-18 PROCEDURE — 2580000003 HC RX 258: Performed by: EMERGENCY MEDICINE

## 2023-02-18 PROCEDURE — 87635 SARS-COV-2 COVID-19 AMP PRB: CPT

## 2023-02-18 PROCEDURE — 6370000000 HC RX 637 (ALT 250 FOR IP): Performed by: EMERGENCY MEDICINE

## 2023-02-18 PROCEDURE — 70450 CT HEAD/BRAIN W/O DYE: CPT

## 2023-02-18 PROCEDURE — 87040 BLOOD CULTURE FOR BACTERIA: CPT

## 2023-02-18 PROCEDURE — 81001 URINALYSIS AUTO W/SCOPE: CPT

## 2023-02-18 PROCEDURE — 96374 THER/PROPH/DIAG INJ IV PUSH: CPT

## 2023-02-18 PROCEDURE — 80053 COMPREHEN METABOLIC PANEL: CPT

## 2023-02-18 RX ORDER — 0.9 % SODIUM CHLORIDE 0.9 %
1000 INTRAVENOUS SOLUTION INTRAVENOUS ONCE
Status: COMPLETED | OUTPATIENT
Start: 2023-02-18 | End: 2023-02-18

## 2023-02-18 RX ORDER — 0.9 % SODIUM CHLORIDE 0.9 %
1000 INTRAVENOUS SOLUTION INTRAVENOUS ONCE
Status: DISCONTINUED | OUTPATIENT
Start: 2023-02-18 | End: 2023-02-18

## 2023-02-18 RX ORDER — DOXYCYCLINE HYCLATE 100 MG/1
100 CAPSULE ORAL ONCE
Status: COMPLETED | OUTPATIENT
Start: 2023-02-18 | End: 2023-02-18

## 2023-02-18 RX ADMIN — CEFTRIAXONE 1000 MG: 1 INJECTION, POWDER, FOR SOLUTION INTRAMUSCULAR; INTRAVENOUS at 20:58

## 2023-02-18 RX ADMIN — SODIUM CHLORIDE 1000 ML: 9 INJECTION, SOLUTION INTRAVENOUS at 19:07

## 2023-02-18 RX ADMIN — DOXYCYCLINE HYCLATE 100 MG: 100 CAPSULE ORAL at 21:26

## 2023-02-18 ASSESSMENT — LIFESTYLE VARIABLES
HOW MANY STANDARD DRINKS CONTAINING ALCOHOL DO YOU HAVE ON A TYPICAL DAY: 1 OR 2
HOW OFTEN DO YOU HAVE A DRINK CONTAINING ALCOHOL: MONTHLY OR LESS

## 2023-02-18 ASSESSMENT — ENCOUNTER SYMPTOMS
EYE ITCHING: 1
VOMITING: 0
EYE REDNESS: 0
NAUSEA: 0
ABDOMINAL PAIN: 0
SHORTNESS OF BREATH: 0

## 2023-02-18 ASSESSMENT — PAIN - FUNCTIONAL ASSESSMENT: PAIN_FUNCTIONAL_ASSESSMENT: NONE - DENIES PAIN

## 2023-02-18 NOTE — ED PROVIDER NOTES
[address]        Pt Name: Livier Duron  MRN: [id number]  [place] 1945  Date of evaluation: 2/18/2023  Provider: Augustin Berry DO  PCP: Krysten Ding MD  Note Started: 6:45 PM EST 2/18/23    CHIEF COMPLAINT       Chief Complaint   Patient presents with    Fall    Fatigue     Possible syncopal episode. States he was in the kitchen and suddenly got weak and was going to fall and wife lowered him to the ground. Denies hitting head denies any pain . Takes eliquis at home       HISTORY OF PRESENT ILLNESS: 1 or more Elements       Livier Duron is a 76 y.o. male who presents to the emergency department with near syncopal episode and altered mental status. The history was obtained from patient as well as the patient's medical record. The patient states that today approximately 1 PM he left for a basketball game. He went to the basketball game. He states that he fell in the parking lot of the basketball game. He did not strike his head. He states that he went to get himself Pedro Polo he went home and attempted to open the sob in front of his wife got very fatigued the sob felt ground. Patient was then lowered to the ground. He was near syncopal.  The patient states that he suddenly felt very fatigued. This is moderate in severity. Nothing made it better. Nothing it made worse. Patient not having treatment prior to arrival.  The patient denies any chest pain, shortness of breath, nausea, vomiting or abdominal pain. During the history the patient does stutter frequently. When questioned about this the patient's wife states that he is a very nervous person. The patient feels that he is talking at his baseline. He denies any other numbness, weakness or paresthesias of the extremities. Nursing Notes were all reviewed and agreed with or any disagreements were addressed in the HPI.     REVIEW OF SYSTEMS :      Review of Systems   Constitutional:  Positive for fatigue. Negative for fever. HENT:  Negative for congestion. Eyes:  Positive for itching. Negative for redness. Respiratory:  Negative for shortness of breath. Cardiovascular:  Negative for chest pain. Gastrointestinal:  Negative for abdominal pain, nausea and vomiting. Genitourinary:  Negative for dysuria. Musculoskeletal:  Negative for arthralgias. Skin:  Negative for rash. Neurological:  Positive for dizziness. Psychiatric/Behavioral:  Positive for confusion. All other systems reviewed and are negative. Positives and Pertinent negatives as per HPI. SURGICAL HISTORY     Past Surgical History:   Procedure Laterality Date    COLONOSCOPY  3/14/14    ENDOSCOPY, COLON, DIAGNOSTIC      HERNIA REPAIR  01/12/11    KNEE ARTHROPLASTY  02/15/2010    KNEE SURGERY Left     POLYPECTOMY  4/28/11    UPPER GASTROINTESTINAL ENDOSCOPY  4/28/11       CURRENTMEDICATIONS       Current Discharge Medication List        CONTINUE these medications which have NOT CHANGED    Details   allopurinol (ZYLOPRIM) 100 MG tablet allopurinol 100 mg tablet   Take 2 tablets every day by oral route. apixaban (ELIQUIS) 5 MG TABS tablet Take 1 tablet by mouth 2 times daily  Qty: 60 tablet, Refills: 0    Comments: 2/19/2019 Eliquis 5mg (5) boxed 14 tabs LOT [id number] 6/2021      lisinopril (PRINIVIL;ZESTRIL) 20 MG tablet TAKE ONE TABLET BY MOUTH EVERY DAY  Qty: 30 tablet, Refills: 5      Multiple Vitamins-Minerals (CENTRUM ULTRA MENS) TABS Take 1 tablet by mouth daily       omeprazole (PRILOSEC) 20 MG capsule Take 20 mg by mouth daily      metFORMIN (GLUCOPHAGE) 1000 MG tablet Take 1,000 mg by mouth 2 times daily (with meals). ALLERGIES     Patient has no known allergies.     FAMILYHISTORY       Family History   Problem Relation Age of Onset    Diabetes Mother     High Blood Pressure Mother     Stroke Mother 72    Diabetes Father     Other Father 67        CHF    Stroke Father     Early Death Sister     Cancer Brother         bladder    No Known Problems Sister         SOCIAL HISTORY       Social History     Tobacco Use    Smoking status: Former     Packs/day: 3.00     Years: 14.00     Pack years: 42.00     Types: Cigarettes     Quit date: 1977     Years since quittin.7    Smokeless tobacco: Former     Quit date: 3/1/1977   Vaping Use    Vaping Use: Never used   Substance Use Topics    Alcohol use: Yes     Alcohol/week: 1.0 - 2.0 standard drink     Types: 1 - 2 Cans of beer per week     Comment: occasional    Drug use: No       SCREENINGS        Kunkle Coma Scale  Eye Opening: Spontaneous  Best Verbal Response: Oriented  Best Motor Response: Obeys commands  Zeeshan Coma Scale Score: 15                CIWA Assessment  BP: 137/63  Heart Rate: 91           PHYSICAL EXAM  1 or more Elements     ED Triage Vitals [23 1809]   BP Temp Temp src Heart Rate Resp SpO2 Height Weight   (!) 111/59 98 °F (36.7 °C) -- (!) 105 18 93 % -- --         Constitutional/General: Alert and oriented x2- oriented to person, place and year not month, well appearing, non toxic in NAD  Head: NC/AT  Eyes:  EOMI  Mouth: Oropharynx clear, handling secretions, no trismus  Neck: Supple, full ROM  Pulmonary: Lungs clear to auscultation bilaterally, no wheezes, rales, or rhonchi. Not in respiratory distress  Cardiovascular:  Regular rate and rhythm, no murmurs, gallops, or rubs. 2+ distal pulses  Abdomen: Soft, non tender, non distended,   Extremities: Moves all extremities x 4. Warm and well perfused  Skin: warm and dry without rash  Neurologic: GCS 14-1 for confusion, cranial nerves II to XII grossly intact bilaterally, 5 out of 5 muscle strength of bilateral upper and lower extremities, mild ataxia present in left upper extremity. Psych: Normal Affect    NIH Stroke Scale/Score at time of initial evaluation:  1A: Level of Consciousness 0 - alert; keenly responsive   1B: Ask Month and Age 1 - answers one question correctly   1C:  Tell Patient To Open and Close Eyes, then Hand  Squeeze 0 - performs both tasks correctly   2: Test Horizontal Extraocular Movements 0 - normal   3: Test Visual Fields 0 - no visual loss   4: Test Facial Palsy 0 - normal symmetric movement   5A: Test Left Arm Motor Drift 0 - no drift, limb holds 90 (or 45) degrees for full 10 seconds   5B: Test Right Arm Motor Drift 0 - no drift, limb holds 90 (or 45) degrees for full 10 seconds   6A: Test Left Leg Motor Drift 0 - no drift; leg holds 30 degree position for full 5 seconds   6B: Test Right Leg Motor Drift 0 - no drift; leg holds 30 degree position for full 5 seconds   7: Test Limb Ataxia   (FNF/Heel-Shin) 1 - present in one limb   8: Test Sensation 0 - normal; no sensory loss   9: Test Language/Aphasia 0 - no aphasia, normal   10: Test Dysarthria 1- mild dysarthria   11: Test Extinction/Inattention 0 - no abnormality   Total Score: 3   2/18/23 at 6:50 PM EST. DIAGNOSTIC RESULTS     I have personally reviewed all laboratory and imaging results for this patient. Results are listed below. LABS:  Results for orders placed or performed during the hospital encounter of 02/18/23   COVID-19, Rapid    Specimen: Nasopharyngeal Swab   Result Value Ref Range    SARS-CoV-2, NAAT Not Detected Not Detected   Rapid influenza A/B antigens    Specimen: Nares   Result Value Ref Range    Influenza A by PCR Not Detected Not Detected    Influenza B by PCR Not Detected Not Detected   Blood Culture 1    Specimen: Blood   Result Value Ref Range    Blood Culture, Routine 24 Hours no growth    Blood Culture 2    Specimen: Blood   Result Value Ref Range    Blood Culture, Routine 24 Hours no growth    STREP PNEUMONIAE ANTIGEN    Specimen: Urine, clean catch   Result Value Ref Range    STREP PNEUMONIAE ANTIGEN, URINE       Presumptive negative- suggests no current or recent  pneumococcal infection.   Infection due to Strep pneumoniae cannot be  ruled out since the antigen present in the sample  may be below the detection limit of the test.  Normal Range:Presumptive Negative     LEGIONELLA ANTIGEN, URINE    Specimen: Urine, clean catch   Result Value Ref Range    L. pneumophila Serogp 1 Ur Ag       Presumptive Negative -suggesting no recent or current infections  with Legionella pneumophila serogroup 1. Infection to Legionella cannot be ruled out since other serogroups  and species may cause infection, antigen may not be present in  early infection, or level of antigen may be below the  detection limit.   Normal Range: Presumptive Negative     CBC with Auto Differential   Result Value Ref Range    WBC 12.8 (H) 4.5 - 11.5 E9/L    RBC 3.56 (L) 3.80 - 5.80 E12/L    Hemoglobin 11.8 (L) 12.5 - 16.5 g/dL    Hematocrit 35.3 (L) 37.0 - 54.0 %    MCV 99.2 80.0 - 99.9 fL    MCH 33.1 26.0 - 35.0 pg    MCHC 33.4 32.0 - 34.5 %    RDW 13.6 11.5 - 15.0 fL    Platelets 273 470 - 954 E9/L    MPV 11.3 7.0 - 12.0 fL    Neutrophils % 84.4 (H) 43.0 - 80.0 %    Immature Granulocytes % 0.5 0.0 - 5.0 %    Lymphocytes % 6.7 (L) 20.0 - 42.0 %    Monocytes % 7.9 2.0 - 12.0 %    Eosinophils % 0.3 0.0 - 6.0 %    Basophils % 0.2 0.0 - 2.0 %    Neutrophils Absolute 10.77 (H) 1.80 - 7.30 E9/L    Immature Granulocytes # 0.07 E9/L    Lymphocytes Absolute 0.85 (L) 1.50 - 4.00 E9/L    Monocytes Absolute 1.01 (H) 0.10 - 0.95 E9/L    Eosinophils Absolute 0.04 (L) 0.05 - 0.50 E9/L    Basophils Absolute 0.02 0.00 - 0.20 E9/L   CMP   Result Value Ref Range    Sodium 134 132 - 146 mmol/L    Potassium 4.6 3.5 - 5.0 mmol/L    Chloride 100 98 - 107 mmol/L    CO2 24 22 - 29 mmol/L    Anion Gap 10 7 - 16 mmol/L    Glucose 251 (H) 74 - 99 mg/dL    BUN 23 6 - 23 mg/dL    Creatinine 1.4 (H) 0.7 - 1.2 mg/dL    Est, Glom Filt Rate 51 >=60 mL/min/1.73    Calcium 9.2 8.6 - 10.2 mg/dL    Total Protein 7.3 6.4 - 8.3 g/dL    Albumin 3.9 3.5 - 5.2 g/dL    Total Bilirubin 0.5 0.0 - 1.2 mg/dL    Alkaline Phosphatase 64 40 - 129 U/L    ALT 21 0 - 40 U/L AST 20 0 - 39 U/L   Troponin   Result Value Ref Range    Troponin, High Sensitivity 34 (H) 0 - 11 ng/L   Urinalysis with Microscopic   Result Value Ref Range    Color, UA Yellow Straw/Yellow    Clarity, UA SL CLOUDY Clear    Glucose, Ur Negative Negative mg/dL    Bilirubin Urine Negative Negative    Ketones, Urine 15 (A) Negative mg/dL    Specific Gravity, UA >=1.030 1.005 - 1.030    Blood, Urine TRACE-INTACT Negative    pH, UA 5.5 5.0 - 9.0    Protein, UA 30 (A) Negative mg/dL    Urobilinogen, Urine 1.0 <2.0 E.U./dL    Nitrite, Urine POSITIVE (A) Negative    Leukocyte Esterase, Urine MODERATE (A) Negative    WBC, UA >20 (A) 0 - 5 /HPF    RBC, UA 0-1 0 - 2 /HPF    Bacteria, UA MANY (A) None Seen /HPF   Basic Metabolic Panel w/ Reflex to MG   Result Value Ref Range    Sodium 137 132 - 146 mmol/L    Potassium reflex Magnesium 3.9 3.5 - 5.0 mmol/L    Chloride 102 98 - 107 mmol/L    CO2 23 22 - 29 mmol/L    Anion Gap 12 7 - 16 mmol/L    Glucose 157 (H) 74 - 99 mg/dL    BUN 20 6 - 23 mg/dL    Creatinine 1.3 (H) 0.7 - 1.2 mg/dL    Est, Glom Filt Rate 56 >=60 mL/min/1.73    Calcium 8.8 8.6 - 10.2 mg/dL   CBC with Auto Differential   Result Value Ref Range    WBC 12.6 (H) 4.5 - 11.5 E9/L    RBC 3.47 (L) 3.80 - 5.80 E12/L    Hemoglobin 11.5 (L) 12.5 - 16.5 g/dL    Hematocrit 34.4 (L) 37.0 - 54.0 %    MCV 99.1 80.0 - 99.9 fL    MCH 33.1 26.0 - 35.0 pg    MCHC 33.4 32.0 - 34.5 %    RDW 13.9 11.5 - 15.0 fL    Platelets 137 130 - 450 E9/L    MPV 11.1 7.0 - 12.0 fL    Neutrophils % 76.6 43.0 - 80.0 %    Immature Granulocytes % 0.8 0.0 - 5.0 %    Lymphocytes % 12.2 (L) 20.0 - 42.0 %    Monocytes % 10.0 2.0 - 12.0 %    Eosinophils % 0.1 0.0 - 6.0 %    Basophils % 0.3 0.0 - 2.0 %    Neutrophils Absolute 9.65 (H) 1.80 - 7.30 E9/L    Immature Granulocytes # 0.10 E9/L    Lymphocytes Absolute 1.54 1.50 - 4.00 E9/L    Monocytes Absolute 1.26 (H) 0.10 - 0.95 E9/L    Eosinophils Absolute 0.01 (L) 0.05 - 0.50 E9/L    Basophils Absolute 0.04  0.00 - 0.20 E9/L   Troponin   Result Value Ref Range    Troponin, High Sensitivity 41 (H) 0 - 11 ng/L   Hemoglobin A1C   Result Value Ref Range    Hemoglobin A1C 6.3 (H) 4.0 - 5.6 %   Lipid, Fasting   Result Value Ref Range    Cholesterol, Fasting 159 0 - 199 mg/dL    Triglyceride, Fasting 74 0 - 149 mg/dL    HDL 39 >40 mg/dL    LDL Calculated 105 (H) 0 - 99 mg/dL    VLDL Cholesterol Calculated 15 mg/dL   Potassium   Result Value Ref Range    Potassium 4.0 3.5 - 5.0 mmol/L   Magnesium   Result Value Ref Range    Magnesium 1.3 (L) 1.6 - 2.6 mg/dL   POCT Glucose   Result Value Ref Range    Meter Glucose 255 (H) 74 - 99 mg/dL   POCT Glucose   Result Value Ref Range    Meter Glucose 171 (H) 74 - 99 mg/dL   POCT Glucose   Result Value Ref Range    Meter Glucose 157 (H) 74 - 99 mg/dL   POCT Glucose   Result Value Ref Range    Meter Glucose 152 (H) 74 - 99 mg/dL   POCT Glucose   Result Value Ref Range    Meter Glucose 221 (H) 74 - 99 mg/dL       RADIOLOGY:  Interpreted by Radiologist.  US CAROTID ARTERY BILATERAL   Final Result   The right internal carotid artery demonstrates 0-50% stenosis. The left internal carotid artery demonstrates 0-50% stenosis. Bilateral vertebral arteries are patent with flow in the normal direction. RECOMMENDATIONS:   Unavailable         MRI BRAIN WO CONTRAST   Final Result   No acute infarct. RECOMMENDATIONS:   Unavailable         XR CHEST PORTABLE   Final Result   Mild CHF. Developing right perihilar pneumonia is considered. CT HEAD WO CONTRAST   Final Result   No acute intracranial abnormality. Chronic parenchymal change including atrophy and old white matter ischemia. CT CERVICAL SPINE WO CONTRAST   Final Result   No acute abnormality of the cervical spine. Multilevel bony and discogenic degenerative changes.                RADIOLOGY:   Non-plain film images such as CT, Ultrasound and MRI are read by the radiologist. Plain radiographic images are visualized and preliminarily interpreted by the ED Provider       Interpretation per the Radiologist below, if available at the time of this note:    [facility]   Final Result   The right internal carotid artery demonstrates 0-50% stenosis. The left internal carotid artery demonstrates 0-50% stenosis. Bilateral vertebral arteries are patent with flow in the normal direction. RECOMMENDATIONS:   Unavailable         MRI BRAIN WO CONTRAST   Final Result   No acute infarct. RECOMMENDATIONS:   Unavailable         XR CHEST PORTABLE   Final Result   Mild CHF. Developing right perihilar pneumonia is considered. CT HEAD WO CONTRAST   Final Result   No acute intracranial abnormality. Chronic parenchymal change including atrophy and old white matter ischemia. CT CERVICAL SPINE WO CONTRAST   Final Result   No acute abnormality of the cervical spine. Multilevel bony and discogenic degenerative changes. No results found. No results found. PROCEDURES   Unless otherwise noted below, none       MDM:   IDr. Lauren am the primary physician of record. Marybel Anton is a 76 y.o. male who presents to the ED for altered mental status and fatigue  Vital signs upon arrival /63   Pulse 91   Temp 97.4 °F (36.3 °C) (Temporal)   Resp 18   Ht 6' (1.829 m)   Wt 215 lb 9.6 oz (97.8 kg)   SpO2 96%   BMI 29.24 kg/m²     History From: The patient, patient's medical record and patient's wife    Independent Historian:  Patient's wife states that the patient left approximately 1 PM today went to a [employer] basketball game. The patient returned home with Ivonscotty Hirsch had sudden onset of what appeared to be fatigue as soon as he got through the door and he ended up dropping the side. He became near syncopal    Limitations to history: None      History: The patient presents to the emergency department with near syncopal episode and altered mental status.   The history was obtained from patient as well as the patient's medical record. The patient states that today approximately 1 PM he left for a basketball game. He went to the basketball game. He states that he fell in the parking lot of the basketball game. He did not strike his head. He states that he went to get himself Davonte Knows he went home and attempted to open the sob in front of his wife got very fatigued the sob felt ground. Patient was then lowered to the ground. He was near syncopal.  The patient states that he suddenly felt very fatigued. This is moderate in severity. Nothing made it better. Nothing it made worse. Patient not having treatment prior to arrival.  The patient denies any chest pain, shortness of breath, nausea, vomiting or abdominal pain. During the history the patient does stutter frequently. When questioned about this the patient's wife states that he is a very nervous person. The patient feels that he is talking at his baseline. He denies any other numbness, weakness or paresthesias of the extremities. Physical exam: Patient sitting in the bed no acute distress. Oriented to person, place and year. Cranial nerves II 12 grossly intact. 5 out of 5 muscle strength bilateral upper and lower extremities. Visual fields intact in all 4 quadrants. Patient does have ataxia of the left upper extremity      Differential diagnosis includes but not limited to; Intracranial hemorrhage-CT head negative  Electrolyte derangement-CMP unremarkable  Hyperglycemia-mild hyperglycemia glucose 251, no evidence of DKA  Dehydration-patient given IV fluid  MI-EKG unremarkable  ACS-high-sensitivity troponin abnormal  Orthostatic hypotension  UTI-patient given IV Rocephin    Records reviewed: Reviewed patient's prior office visit with his cardiologist Dr. Chris Goodman patient does have paroxysmal A-fib.   He did follow-up in the office 11/29/2022      Patient treated with   Medications   sodium chloride flush 0.9 % injection 5-40 mL (10 mLs IntraVENous Given 2/19/23 2031)   sodium chloride flush 0.9 % injection 5-40 mL (has no administration in time range)   0.9 % sodium chloride infusion (has no administration in time range)   acetaminophen (TYLENOL) tablet 650 mg (650 mg Oral Given 2/19/23 1102)     Or   acetaminophen (TYLENOL) suppository 650 mg ( Rectal See Alternative 2/19/23 1102)   bisacodyl (DULCOLAX) suppository 10 mg (has no administration in time range)   allopurinol (ZYLOPRIM) tablet 200 mg (200 mg Oral Given 2/19/23 0930)   amLODIPine (NORVASC) tablet 5 mg (5 mg Oral Given 2/19/23 0930)   apixaban (ELIQUIS) tablet 5 mg (5 mg Oral Given 2/19/23 2031)   glucose chewable tablet 16 g (has no administration in time range)   dextrose bolus 10% 125 mL (has no administration in time range)     Or   dextrose bolus 10% 250 mL (has no administration in time range)   glucagon injection 1 mg (has no administration in time range)   dextrose 10 % infusion (has no administration in time range)   insulin lispro (HUMALOG) injection vial 0-4 Units (0 Units SubCUTAneous Not Given 2/19/23 1630)   insulin lispro (HUMALOG) injection vial 0-4 Units (0 Units SubCUTAneous Not Given 2/19/23 2058)   0.9 % sodium chloride infusion ( IntraVENous New Bag 2/19/23 0030)   multivitamin 1 tablet (1 tablet Oral Given 2/19/23 0930)   pantoprazole (PROTONIX) tablet 40 mg (40 mg Oral Given 2/19/23 0930)   cefTRIAXone (ROCEPHIN) 1,000 mg in sterile water 10 mL IV syringe (has no administration in time range)   doxycycline (VIBRAMYCIN) 100 mg in sodium chloride 0.9 % 100 mL IVPB (Acxp5Pib) (0 mg IntraVENous Stopped 2/19/23 2232)   ipratropium-albuterol (DUONEB) nebulizer solution 1 ampule (has no administration in time range)   guaiFENesin (ROBITUSSIN) 100 MG/5ML liquid 200 mg (has no administration in time range)   tamsulosin (FLOMAX) capsule 0.4 mg (0.4 mg Oral Given 2/19/23 1330)   magnesium sulfate 2000 mg in 50 mL IVPB premix (2,000 mg IntraVENous New Bag 2/19/23 2215)   0.9 % sodium chloride bolus (0 mLs IntraVENous Stopped 2/18/23 2114)   cefTRIAXone (ROCEPHIN) 1,000 mg in sterile water 10 mL IV syringe (1,000 mg IntraVENous Given 2/18/23 2058)   doxycycline hyclate (VIBRAMYCIN) capsule 100 mg (100 mg Oral Given 2/18/23 2126)         Labs independently interpreted and reviewed by me demonstrate:  CBC-mild leukocytosis 12.8  CMP mild hyperglycemia glucose 251, no evidence of DKA  High-sensitivity troponin unremarkable  Urinalysis positive for urinary tract infection-patient given IV Rocephin    Imaging reviewed and interpreted by me demonstrates  CT of the head demonstrates no acute intracranial process  Chest x-ray demonstrates possible pneumonia-patient given IV antibiotic    EKG: This EKG is signed and interpreted by me.     Atrial fibrillation rate of 106, no ST segment elevation, there is mild T wave inversions in the lateral leads, QRS duration 80 MS, QTc 441 MS    Discussions with other health professionals:  ED Course as of 02/19/23 2331   Sat Feb 18, 2023 2144 Spoke with April for Dr. Merlinda Glance she will accept the patient for admission [MT]      ED Course User Index  [MT] Erin Omalley DO       Chronic conditions:   Past Medical History:   Diagnosis Date    Arthritis     Atrial fibrillation ([facility] [address]     Alonzo's esophagus     BPH (benign prostatic hypertrophy)     Diverticulosis     Gastritis     GERD (gastroesophageal reflux disease)     H/O non-insulin dependent diabetes mellitus     Hemorrhoids, internal     Hernia, hiatal     HTN (hypertension)     Hyperlipidemia     Hypertension     Hypoxemia requiring supplemental oxygen     Mobitz type 1 second degree atrioventricular block     Nonalcoholic fatty liver disease     Obesity     Pneumonia     Polyp of colon     Sleep apnea     Type II diabetes mellitus with HbA1C goal to be determined [facility])          CONSULTS: Internal medicine    Risk/Disposition: Patient presenting emergency department the chief complaint of syncope ultimately status. The patient did have labs and imaging which were reviewed. On physical exam patient was found to be ataxic of left upper extremity as well last known well was approximately 5 hours prior to arrival in the emergency department. CT head demonstrated no acute process. Patient did have labs which were reviewed. He is found of urinary tract infection. Patient is given IV fluids as well as IV Rocephin and doxycycline for the patient will be admitted for further treatment and evaluation            EMERGENCY DEPARTMENT COURSE    Vitals:    Vitals:    02/19/23 1445 02/19/23 1618 02/19/23 1930 02/19/23 2305   BP: (!) 112/90  (!) 140/60 137/63   Pulse: 73  96 91   Resp: 18 18 18   Temp: 98.6 °F (37 °C)  97.4 °F (36.3 °C) 97.4 °F (36.3 °C)   TempSrc: Oral  Temporal Temporal   SpO2:   96% 96%   Weight:       Height:  6' (1.829 m)         ED Course as of 02/19/23 2331   Sat Feb 18, 2023 2144 Spoke with April for Dr. Jacobo Eng she will accept the patient for admission [MT]      ED Course User Index  [MT] Linsey Mas DO          I am the Primary Clinician of Record. FINAL IMPRESSION      1. Pneumonia due to infectious organism, unspecified laterality, unspecified part of lung    2. Acute cystitis without hematuria    3. MARCELLA (acute kidney injury) ([facility] [address]    4. Altered mental status, unspecified altered mental status type          DISPOSITION/PLAN      Admitted 02/18/2023 09:45:15 PM    Patient's disposition: Admit to telemetry  Patient's condition is stable.            (Please note that portions of this note were completed with a voice recognition program.  Efforts were made to edit the dictations but occasionally words are mis-transcribed.)    Linsey Mas DO (electronically signed)       Linsey Mas DO  02/19/23 [id number]

## 2023-02-19 ENCOUNTER — APPOINTMENT (OUTPATIENT)
Dept: MRI IMAGING | Age: 78
DRG: 689 | End: 2023-02-19
Payer: MEDICARE

## 2023-02-19 ENCOUNTER — APPOINTMENT (OUTPATIENT)
Dept: ULTRASOUND IMAGING | Age: 78
DRG: 689 | End: 2023-02-19
Payer: MEDICARE

## 2023-02-19 LAB
ANION GAP SERPL CALCULATED.3IONS-SCNC: 12 MMOL/L (ref 7–16)
BASOPHILS ABSOLUTE: 0.04 E9/L (ref 0–0.2)
BASOPHILS RELATIVE PERCENT: 0.3 % (ref 0–2)
BUN BLDV-MCNC: 20 MG/DL (ref 6–23)
CALCIUM SERPL-MCNC: 8.8 MG/DL (ref 8.6–10.2)
CHLORIDE BLD-SCNC: 102 MMOL/L (ref 98–107)
CHOLESTEROL, FASTING: 159 MG/DL (ref 0–199)
CO2: 23 MMOL/L (ref 22–29)
CREAT SERPL-MCNC: 1.3 MG/DL (ref 0.7–1.2)
EOSINOPHILS ABSOLUTE: 0.01 E9/L (ref 0.05–0.5)
EOSINOPHILS RELATIVE PERCENT: 0.1 % (ref 0–6)
GFR SERPL CREATININE-BSD FRML MDRD: 56 ML/MIN/1.73
GLUCOSE BLD-MCNC: 157 MG/DL (ref 74–99)
HBA1C MFR BLD: 6.3 % (ref 4–5.6)
HCT VFR BLD CALC: 34.4 % (ref 37–54)
HDLC SERPL-MCNC: 39 MG/DL
HEMOGLOBIN: 11.5 G/DL (ref 12.5–16.5)
IMMATURE GRANULOCYTES #: 0.1 E9/L
IMMATURE GRANULOCYTES %: 0.8 % (ref 0–5)
L. PNEUMOPHILA SEROGP 1 UR AG: NORMAL
LDL CHOLESTEROL CALCULATED: 105 MG/DL (ref 0–99)
LYMPHOCYTES ABSOLUTE: 1.54 E9/L (ref 1.5–4)
LYMPHOCYTES RELATIVE PERCENT: 12.2 % (ref 20–42)
MAGNESIUM: 1.3 MG/DL (ref 1.6–2.6)
MCH RBC QN AUTO: 33.1 PG (ref 26–35)
MCHC RBC AUTO-ENTMCNC: 33.4 % (ref 32–34.5)
MCV RBC AUTO: 99.1 FL (ref 80–99.9)
METER GLUCOSE: 152 MG/DL (ref 74–99)
METER GLUCOSE: 157 MG/DL (ref 74–99)
METER GLUCOSE: 171 MG/DL (ref 74–99)
METER GLUCOSE: 221 MG/DL (ref 74–99)
MONOCYTES ABSOLUTE: 1.26 E9/L (ref 0.1–0.95)
MONOCYTES RELATIVE PERCENT: 10 % (ref 2–12)
NEUTROPHILS ABSOLUTE: 9.65 E9/L (ref 1.8–7.3)
NEUTROPHILS RELATIVE PERCENT: 76.6 % (ref 43–80)
PDW BLD-RTO: 13.9 FL (ref 11.5–15)
PLATELET # BLD: 137 E9/L (ref 130–450)
PMV BLD AUTO: 11.1 FL (ref 7–12)
POTASSIUM REFLEX MAGNESIUM: 3.9 MMOL/L (ref 3.5–5)
POTASSIUM SERPL-SCNC: 4 MMOL/L (ref 3.5–5)
RBC # BLD: 3.47 E12/L (ref 3.8–5.8)
SODIUM BLD-SCNC: 137 MMOL/L (ref 132–146)
STREP PNEUMONIAE ANTIGEN, URINE: NORMAL
TRIGLYCERIDE, FASTING: 74 MG/DL (ref 0–149)
TROPONIN, HIGH SENSITIVITY: 41 NG/L (ref 0–11)
VLDLC SERPL CALC-MCNC: 15 MG/DL
WBC # BLD: 12.6 E9/L (ref 4.5–11.5)

## 2023-02-19 PROCEDURE — 83036 HEMOGLOBIN GLYCOSYLATED A1C: CPT

## 2023-02-19 PROCEDURE — 93880 EXTRACRANIAL BILAT STUDY: CPT

## 2023-02-19 PROCEDURE — 97535 SELF CARE MNGMENT TRAINING: CPT

## 2023-02-19 PROCEDURE — 70551 MRI BRAIN STEM W/O DYE: CPT

## 2023-02-19 PROCEDURE — 83735 ASSAY OF MAGNESIUM: CPT

## 2023-02-19 PROCEDURE — 2580000003 HC RX 258: Performed by: NURSE PRACTITIONER

## 2023-02-19 PROCEDURE — 80048 BASIC METABOLIC PNL TOTAL CA: CPT

## 2023-02-19 PROCEDURE — 2140000000 HC CCU INTERMEDIATE R&B

## 2023-02-19 PROCEDURE — 80061 LIPID PANEL: CPT

## 2023-02-19 PROCEDURE — 2500000003 HC RX 250 WO HCPCS: Performed by: NURSE PRACTITIONER

## 2023-02-19 PROCEDURE — 82962 GLUCOSE BLOOD TEST: CPT

## 2023-02-19 PROCEDURE — 6360000002 HC RX W HCPCS: Performed by: NURSE PRACTITIONER

## 2023-02-19 PROCEDURE — 97166 OT EVAL MOD COMPLEX 45 MIN: CPT

## 2023-02-19 PROCEDURE — 84484 ASSAY OF TROPONIN QUANT: CPT

## 2023-02-19 PROCEDURE — 6370000000 HC RX 637 (ALT 250 FOR IP): Performed by: NURSE PRACTITIONER

## 2023-02-19 PROCEDURE — 87449 NOS EACH ORGANISM AG IA: CPT

## 2023-02-19 PROCEDURE — 84132 ASSAY OF SERUM POTASSIUM: CPT

## 2023-02-19 PROCEDURE — 51798 US URINE CAPACITY MEASURE: CPT

## 2023-02-19 PROCEDURE — 85025 COMPLETE CBC W/AUTO DIFF WBC: CPT

## 2023-02-19 PROCEDURE — 6370000000 HC RX 637 (ALT 250 FOR IP): Performed by: STUDENT IN AN ORGANIZED HEALTH CARE EDUCATION/TRAINING PROGRAM

## 2023-02-19 PROCEDURE — 36415 COLL VENOUS BLD VENIPUNCTURE: CPT

## 2023-02-19 RX ORDER — INSULIN LISPRO 100 [IU]/ML
0-4 INJECTION, SOLUTION INTRAVENOUS; SUBCUTANEOUS
Status: DISCONTINUED | OUTPATIENT
Start: 2023-02-19 | End: 2023-02-21 | Stop reason: HOSPADM

## 2023-02-19 RX ORDER — GUAIFENESIN 200 MG/10ML
200 LIQUID ORAL EVERY 6 HOURS PRN
Status: DISCONTINUED | OUTPATIENT
Start: 2023-02-19 | End: 2023-02-21 | Stop reason: HOSPADM

## 2023-02-19 RX ORDER — ACETAMINOPHEN 325 MG/1
650 TABLET ORAL EVERY 6 HOURS PRN
Status: DISCONTINUED | OUTPATIENT
Start: 2023-02-19 | End: 2023-02-21 | Stop reason: HOSPADM

## 2023-02-19 RX ORDER — BISACODYL 10 MG
10 SUPPOSITORY, RECTAL RECTAL DAILY PRN
Status: DISCONTINUED | OUTPATIENT
Start: 2023-02-19 | End: 2023-02-21 | Stop reason: HOSPADM

## 2023-02-19 RX ORDER — SODIUM CHLORIDE 0.9 % (FLUSH) 0.9 %
5-40 SYRINGE (ML) INJECTION PRN
Status: DISCONTINUED | OUTPATIENT
Start: 2023-02-19 | End: 2023-02-21 | Stop reason: HOSPADM

## 2023-02-19 RX ORDER — SODIUM CHLORIDE 9 MG/ML
INJECTION, SOLUTION INTRAVENOUS CONTINUOUS
Status: DISCONTINUED | OUTPATIENT
Start: 2023-02-19 | End: 2023-02-21 | Stop reason: HOSPADM

## 2023-02-19 RX ORDER — AMLODIPINE BESYLATE 5 MG/1
5 TABLET ORAL DAILY
Status: DISCONTINUED | OUTPATIENT
Start: 2023-02-19 | End: 2023-02-21 | Stop reason: HOSPADM

## 2023-02-19 RX ORDER — SODIUM CHLORIDE 9 MG/ML
INJECTION, SOLUTION INTRAVENOUS PRN
Status: DISCONTINUED | OUTPATIENT
Start: 2023-02-19 | End: 2023-02-21 | Stop reason: HOSPADM

## 2023-02-19 RX ORDER — ACETAMINOPHEN 650 MG/1
650 SUPPOSITORY RECTAL EVERY 6 HOURS PRN
Status: DISCONTINUED | OUTPATIENT
Start: 2023-02-19 | End: 2023-02-21 | Stop reason: HOSPADM

## 2023-02-19 RX ORDER — MAGNESIUM SULFATE IN WATER 40 MG/ML
2000 INJECTION, SOLUTION INTRAVENOUS ONCE
Status: COMPLETED | OUTPATIENT
Start: 2023-02-19 | End: 2023-02-20

## 2023-02-19 RX ORDER — SODIUM CHLORIDE 0.9 % (FLUSH) 0.9 %
5-40 SYRINGE (ML) INJECTION EVERY 12 HOURS SCHEDULED
Status: DISCONTINUED | OUTPATIENT
Start: 2023-02-19 | End: 2023-02-21 | Stop reason: HOSPADM

## 2023-02-19 RX ORDER — ALLOPURINOL 100 MG/1
200 TABLET ORAL DAILY
Status: DISCONTINUED | OUTPATIENT
Start: 2023-02-19 | End: 2023-02-21 | Stop reason: HOSPADM

## 2023-02-19 RX ORDER — PANTOPRAZOLE SODIUM 40 MG/1
40 TABLET, DELAYED RELEASE ORAL DAILY
Status: DISCONTINUED | OUTPATIENT
Start: 2023-02-19 | End: 2023-02-21 | Stop reason: HOSPADM

## 2023-02-19 RX ORDER — INSULIN LISPRO 100 [IU]/ML
0-4 INJECTION, SOLUTION INTRAVENOUS; SUBCUTANEOUS NIGHTLY
Status: DISCONTINUED | OUTPATIENT
Start: 2023-02-19 | End: 2023-02-21 | Stop reason: HOSPADM

## 2023-02-19 RX ORDER — DEXTROSE MONOHYDRATE 100 MG/ML
INJECTION, SOLUTION INTRAVENOUS CONTINUOUS PRN
Status: DISCONTINUED | OUTPATIENT
Start: 2023-02-19 | End: 2023-02-21 | Stop reason: HOSPADM

## 2023-02-19 RX ORDER — MULTIVITAMIN WITH IRON
1 TABLET ORAL DAILY
Status: DISCONTINUED | OUTPATIENT
Start: 2023-02-19 | End: 2023-02-21 | Stop reason: HOSPADM

## 2023-02-19 RX ORDER — IPRATROPIUM BROMIDE AND ALBUTEROL SULFATE 2.5; .5 MG/3ML; MG/3ML
1 SOLUTION RESPIRATORY (INHALATION) EVERY 6 HOURS PRN
Status: DISCONTINUED | OUTPATIENT
Start: 2023-02-19 | End: 2023-02-21 | Stop reason: HOSPADM

## 2023-02-19 RX ORDER — TAMSULOSIN HYDROCHLORIDE 0.4 MG/1
0.4 CAPSULE ORAL DAILY
Status: DISCONTINUED | OUTPATIENT
Start: 2023-02-19 | End: 2023-02-21 | Stop reason: HOSPADM

## 2023-02-19 RX ADMIN — ALLOPURINOL 200 MG: 100 TABLET ORAL at 09:30

## 2023-02-19 RX ADMIN — MAGNESIUM SULFATE HEPTAHYDRATE 2000 MG: 40 INJECTION, SOLUTION INTRAVENOUS at 22:15

## 2023-02-19 RX ADMIN — TAMSULOSIN HYDROCHLORIDE 0.4 MG: 0.4 CAPSULE ORAL at 13:30

## 2023-02-19 RX ADMIN — DOXYCYCLINE 100 MG: 100 INJECTION, POWDER, LYOPHILIZED, FOR SOLUTION INTRAVENOUS at 09:36

## 2023-02-19 RX ADMIN — APIXABAN 5 MG: 5 TABLET, FILM COATED ORAL at 09:30

## 2023-02-19 RX ADMIN — AMLODIPINE BESYLATE 5 MG: 5 TABLET ORAL at 09:30

## 2023-02-19 RX ADMIN — ACETAMINOPHEN 650 MG: 325 TABLET ORAL at 11:02

## 2023-02-19 RX ADMIN — PANTOPRAZOLE SODIUM 40 MG: 40 TABLET, DELAYED RELEASE ORAL at 09:30

## 2023-02-19 RX ADMIN — APIXABAN 5 MG: 5 TABLET, FILM COATED ORAL at 00:24

## 2023-02-19 RX ADMIN — DOXYCYCLINE 100 MG: 100 INJECTION, POWDER, LYOPHILIZED, FOR SOLUTION INTRAVENOUS at 20:55

## 2023-02-19 RX ADMIN — APIXABAN 5 MG: 5 TABLET, FILM COATED ORAL at 20:31

## 2023-02-19 RX ADMIN — Medication 1 TABLET: at 09:30

## 2023-02-19 RX ADMIN — Medication 10 ML: at 09:30

## 2023-02-19 RX ADMIN — Medication 10 ML: at 20:31

## 2023-02-19 RX ADMIN — SODIUM CHLORIDE: 9 INJECTION, SOLUTION INTRAVENOUS at 00:30

## 2023-02-19 ASSESSMENT — PAIN SCALES - GENERAL
PAINLEVEL_OUTOF10: 0

## 2023-02-19 NOTE — PATIENT CARE CONFERENCE
[facility] Quality Flow/Interdisciplinary Rounds Progress Note        Quality Flow Rounds held on February 19, 2023    Disciplines Attending:  Bedside Nurse, , , and Nursing Unit Leadership    Dulce Teran was admitted on 2/18/2023  6:08 PM    Anticipated Discharge Date:       Disposition:    Ronak Score:  Ronak Scale Score: 19    Readmission Risk              Risk of Unplanned Readmission:  12           Discussed patient goal for the day, patient clinical progression, and barriers to discharge. The following Goal(s) of the Day/Commitment(s) have been identified:  Labs - Report Results.       Ami Puentes RN  February 19, 2023

## 2023-02-19 NOTE — PROGRESS NOTES
OCCUPATIONAL THERAPY INITIAL EVALUATION    [address] [address] [place]      Date:2023                                                  Patient Name: Maximo Patient  MRN: [id number]  : 1945  Room: [address] [place] [address]    Evaluating OT: Amanda Cruz, ADDI, OTR/L [id number]  Referring Provider:CLOTILDE Weston CNP  Specific Provider Orders: OT eval and treat   Recommended Adaptive Equipment: 24 hr assist     Diagnosis: MARCELLA   Surgery: none   Pertinent Medical History: HTN, HLD, DM, a-fib, barretts esophagus, hernia   Precautions:  Fall Risk    Assessment of current deficits   [x] Functional mobility  [x]ADLs  [x] Strength               [x]Cognition   [x] Functional transfers   [x] IADLs         [x] Safety Awareness   [x]Endurance   [] Fine Coordination              [x] Balance      [] Vision/perception   [x]Sensation    []Gross Motor Coordination  [] ROM  [] Delirium                   [] Motor Control     OT PLAN OF CARE   OT POC based on physician orders, patient diagnosis and results of clinical assessment    Frequency/Duration: 2-4 days/wk for 2 weeks PRN   Specific OT Treatment to include:   * Instruction/training on adapted ADL techniques and AE recommendations to increase functional independence within precautions       * Training on energy conservation strategies, correct breathing pattern and techniques to improve independence/tolerance for self-care routine  * Functional transfer/mobility training/DME recommendations for increased independence, safety, and fall prevention  * Patient/Family education to increase follow through with safety techniques and functional independence  * Recommendation of environmental modifications for increased safety with functional transfers/mobility and ADLs  * Therapeutic exercise to improve motor endurance, ROM, and functional strength for ADLs/functional transfers  * Therapeutic activities to facilitate/challenge dynamic balance, stand tolerance for increased safety and independence with ADLs  * Neuro-muscular re-education: facilitation of righting/equilibrium reactions, midline orientation, scapular stability/mobility, normalization of muscle tone, and facilitation of volitional active controled movement    Home Living: Pt lives with wife in a 1 story home; bed/bath on main level   Bathroom setup: 1 tub shower unit and 1 walk in shower   Equipment owned: shower chair, BSC, cane  Prior Level of Function: IND with ADLs/IADLs; using ww for functional mobility     Pain Level: 0/10  Cognition: A&O: 3/4; Follows 1 step directions, with repetition and increased time   Memory:  good    Sequencing:  good    Problem solving:  fair    Judgement/safety:  fair     Functional Assessment:  AM-PAC Daily Activity Raw Score: 15/24   Initial Eval Status  Date: 2/19/23 Treatment Status  Date: STGs=LTGs  Time Frame: 10-14 days   Feeding SBA   IND   Grooming SBA (seated )   IND   UB Dressing MiN A   SBA   LB Dressing Mod A (pt crossed BLEs to don socks)  Min A    Bathing Mod A (simulated)  MiN A    Toileting Mod A  Min A   Bed Mobility  Log roll: Mod A  Supine to sit: Mod A   Sit to supine: NT   Log roll SBA  Supine to sit: SBA   Sit to supine: SBA   Functional Transfers Sit to stand:Min A   Stand to sit:Min A  Commode: Min A  SBA   Functional Mobility Min A (using ww, to/from bathroom)  SBA   Balance Sitting: SBA  Standing: Min A     Activity Tolerance fair     Visual/  Perceptual Glasses: yes              UE ROM: BUE: elbow flex WFL, shoulder flex grossly 140'  Strength: RUE: grossly 3/5 LUE: grossly 3/5   Strength: B WFL  Fine Motor Coordination:  WFL     Hearing: [place]  Sensation:  No c/o numbness/tingling   Tone:  WFL  Edema: BLEs                            Comments:Cleared by RN to see pt. Upon arrival, patient supine in bed and agreeable to OT session. Wife present.  At end of session, patient sitting in chair with call light and phone within reach, all lines and tubes intact. Pt would benefit from continued OT to increase functional independence and quality of life. Treatment: Pt required vc's and physical assist for proper technique/safety with hand placement/body mechanics/posture for bed mobility/ADLs/functional tranfers/mobility/ww management. Pt required vc's for sequencing/initiation of ADLs/functional transfers. Pt able to  sit EOB ~10 mins to increase core strength/balance/activity tolerance for ease with ADLs. Pt required increased time to complete ADLs/functional transfers due to management of multiple lines, rest breaks, and physical assist. Pt appeared to have tolerated session well and appears motivated/cooperative/pleasant . Pt instructed on use of call light for assistance and fall prevention. Pt demo'ing fair understanding of education provided. Continue to educate. Eval Complexity: moderate  History: Expanded chart review of medical records and additional review of physical, cognitive, or psychosocial history related to current functional performance  Exam: 3+ performance deficits  Assistance/Modification: mod/max assistance or modifications required to perform tasks. May have comorbidities that affect occupational performance. Rehab Potential: Good for established goals, pt. assisted in establishment of goals. LTG: maximize independence with ADLs to return to PLOF    Patient and/or family were instructed on diagnosis, prognosis/goals and plan of care. Demonstrated fair understanding. [] Malnutrition indicators have been identified and nursing has been notified to ensure a dietitian consult is ordered. Evaluation time includes thorough review of current medical information, gathering information on past medical & social history & PLOF, completion of standardized testing, informal observation of tasks, consultation with other medical professions/disciplines, assessment of data & development of POC/goals.      Time In: 0920       Time Out: 0945     Total treatment time: 15       Treatment Charges: Mins Units   OT Eval Low 73096     OT Eval Medium 70354 X    OT Eval High 25835     OT Re-Eval [id number]     Ther Ex  10702       Manual Therapy 72433       Thera Activities 46984       ADL/Home Mgt 08792 15 1   Neuro Re-ed 52619       Group Therapy        Orthotic manage/training  79215       Non-Billable Time           Trish Galaviz, [address] [facility], OTR/L [id number]

## 2023-02-19 NOTE — PLAN OF CARE
Problem: Chronic Conditions and Co-morbidities  Goal: Patient's chronic conditions and co-morbidity symptoms are monitored and maintained or improved  2/19/2023 1210 by Alvin Rodriguez RN  Outcome: Progressing  2/19/2023 0001 by Will KENNEDY Pena  Outcome: Progressing     Problem: Safety - Adult  Goal: Free from fall injury  2/19/2023 1210 by Alvin Rodriguez RN  Outcome: Progressing  2/19/2023 0001 by Will KENNEDY Pena  Outcome: Progressing     Problem: ABCDS Injury Assessment  Goal: Absence of physical injury  2/19/2023 1210 by Alvin Rodriguez RN  Outcome: Progressing  2/19/2023 0001 by Will KENNEDY Pena  Outcome: Progressing     Problem: Pain  Goal: Verbalizes/displays adequate comfort level or baseline comfort level  Outcome: Progressing

## 2023-02-19 NOTE — H&P
[facility]  History and Physical      CHIEF COMPLAINT:    Chief Complaint   Patient presents with    Fall    Fatigue     Possible syncopal episode. States he was in the kitchen and suddenly got weak and was going to fall and wife lowered him to the ground. Denies hitting head denies any pain . Takes eliquis at home        Patient of Hola Mendosa MD presents with:  Altered mental status    History of Present Illness:Manny Ceballos, 75-year-old male with a past medical history of A-fib on Eliquis, diabetes hypertension apnea hyperlipidemia and Alonzo's esophagus BPH with recurrent urinary retention presents to the ED with with a possible syncopal episode and altered mental status. Patient states that he fell in a parking lot but denies striking his head, once patient returned home he felt very short of breath and fatigued and was lowered to the ground. ED work-up CT scans of the head and cervical spine were both negative for any acute abnormalities. Initial NIH evaluation score of 3, however the chest x-ray did show developing pneumonia. Patient was started on Rocephin and doxycycline. Patient is being admitted for further evaluation and treatment. On evaluation he is sitting up in chair surrounded by family. Denies any headache changes or unilateral weakness. Dry cough is present otherwise no other acute complaints    REVIEW OF SYSTEMS:  Pertinent negatives are above in HPI. 10 point ROS otherwise negative.       Past Medical History:   Diagnosis Date    Arthritis     Atrial fibrillation (HCC)     Alonzo's esophagus     BPH (benign prostatic hypertrophy)     Diverticulosis     Gastritis     GERD (gastroesophageal reflux disease)     H/O non-insulin dependent diabetes mellitus     Hemorrhoids, internal     Hernia, hiatal     HTN (hypertension)     Hyperlipidemia     Hypertension     Hypoxemia requiring supplemental oxygen     Mobitz type 1 second degree atrioventricular block     Nonalcoholic fatty liver disease     Obesity     Pneumonia     Polyp of colon     Sleep apnea     Type II diabetes mellitus with HbA1C goal to be determined [facility])          Past Surgical History:   Procedure Laterality Date    COLONOSCOPY  3/14/14    ENDOSCOPY, COLON, DIAGNOSTIC      HERNIA REPAIR  01/12/11    KNEE ARTHROPLASTY  02/15/2010    KNEE SURGERY Left     POLYPECTOMY  4/28/11    UPPER GASTROINTESTINAL ENDOSCOPY  4/28/11       Medications Prior to Admission:    Medications Prior to Admission: allopurinol (ZYLOPRIM) 100 MG tablet, allopurinol 100 mg tablet  Take 2 tablets every day by oral route. [DISCONTINUED] hydrochlorothiazide (HYDRODIURIL) 50 MG tablet, Take 1 tablet by mouth daily (Patient not taking: Reported on 11/29/2022)  [DISCONTINUED] amLODIPine (NORVASC) 5 MG tablet, Take 1 tablet by mouth daily  apixaban (ELIQUIS) 5 MG TABS tablet, Take 1 tablet by mouth 2 times daily  lisinopril (PRINIVIL;ZESTRIL) 20 MG tablet, TAKE ONE TABLET BY MOUTH EVERY DAY  [DISCONTINUED] rosuvastatin (CRESTOR) 5 MG tablet, Take 5 mg by mouth three times a week  (Patient not taking: Reported on 11/29/2022)  [DISCONTINUED] glimepiride (AMARYL) 4 MG tablet, Take 4 mg by mouth every morning (before breakfast) (Patient not taking: Reported on 2/18/2023)  Multiple Vitamins-Minerals (CENTRUM ULTRA MENS) TABS, Take 1 tablet by mouth daily   omeprazole (PRILOSEC) 20 MG capsule, Take 20 mg by mouth daily  metFORMIN (GLUCOPHAGE) 1000 MG tablet, Take 1,000 mg by mouth 2 times daily (with meals). Note that the patient's home medications were reviewed and the above list is accurate to the best of my knowledge at the time of the exam.    Allergies:    Patient has no known allergies. Social History:    reports that he quit smoking about 45 years ago. His smoking use included cigarettes. He has a 42.00 pack-year smoking history. He quit smokeless tobacco use about 46 years ago.  He reports current alcohol use of about 1.0 - 2.0 standard drink per week. He reports that he does not use drugs. Family History:   family history includes Cancer in his brother; Diabetes in his father and mother; Early Death in his sister; High Blood Pressure in his mother; No Known Problems in his sister; Other (age of onset: 67) in his father; Stroke in his father; Stroke (age of onset: 72) in his mother. PHYSICAL EXAM:    Vitals:  BP (!) 150/75   Pulse 83   Temp 98.5 °F (36.9 °C) (Temporal)   Resp 18   Ht 6' (1.829 m)   Wt 215 lb 9.6 oz (97.8 kg)   SpO2 94%   BMI 29.24 kg/m²       General appearance: NAD, conversant  Eyes: Sclerae anicteric, PERRLA  HEENT: AT/NC, MMM  Neck: FROM, supple, no thyromegaly  Lymph: No cervical / supraclavicular lymphadenopathy  Lungs: Clear to auscultation, WOB normal  CV: RRR, no MRGs, no lower extremity edema  Abdomen: Soft, non-tender; no masses or HSM, +BS  Extremities: FROM without synovitis. No clubbing or cyanosis of the hands. Skin: no rash, induration, lesions, or ulcers  Psych: Calm and cooperative. Normal judgement and insight. Normal mood and affect. Neuro: Alert and interactive, face symmetric, speech fluent. LABS:  All labs reviewed.   Of note:  CBC with Differential:    Lab Results   Component Value Date/Time    WBC 12.6 02/19/2023 06:10 AM    RBC 3.47 02/19/2023 06:10 AM    HGB 11.5 02/19/2023 06:10 AM    HCT 34.4 02/19/2023 06:10 AM     02/19/2023 06:10 AM    MCV 99.1 02/19/2023 06:10 AM    MCH 33.1 02/19/2023 06:10 AM    MCHC 33.4 02/19/2023 06:10 AM    RDW 13.9 02/19/2023 06:10 AM    BANDSPCT 3 11/03/2015 01:05 AM    LYMPHOPCT 12.2 02/19/2023 06:10 AM    MONOPCT 10.0 02/19/2023 06:10 AM    BASOPCT 0.3 02/19/2023 06:10 AM    MONOSABS 1.26 02/19/2023 06:10 AM    LYMPHSABS 1.54 02/19/2023 06:10 AM    EOSABS 0.01 02/19/2023 06:10 AM    BASOSABS 0.04 02/19/2023 06:10 AM     CMP:    Lab Results   Component Value Date/Time     02/19/2023 06:10 AM    K 3.9 02/19/2023 06:10 AM     02/19/2023 06:10 AM    CO2 23 02/19/2023 06:10 AM    BUN 20 02/19/2023 06:10 AM    CREATININE 1.3 02/19/2023 06:10 AM    GFRAA >60 11/03/2015 01:05 AM    LABGLOM 56 02/19/2023 06:10 AM    GLUCOSE 157 02/19/2023 06:10 AM    PROT 7.3 02/18/2023 06:51 PM    LABALBU 3.9 02/18/2023 06:51 PM    CALCIUM 8.8 02/19/2023 06:10 AM    BILITOT 0.5 02/18/2023 06:51 PM    ALKPHOS 64 02/18/2023 06:51 PM    AST 20 02/18/2023 06:51 PM    ALT 21 02/18/2023 06:51 PM       Imaging:  Chest x-ray-mild CHF. Developing right perihilar pneumonia. CT head without contrast-no acute intracranial abnormality. Chronic parenchymal change including atrophy and old white matter ischemia. CT cervical spine without contrast-no acute abnormality of the cervical spine. Multilevel bony and discogenic degenerative changes. EKG:    Telemetry:  I've personally reviewed the patient's telemetry:      ASSESSMENT/PLAN:  Principal Problem:    Altered mental status  Active Problems:    UTI (urinary tract infection)    Pneumonia  Resolved Problems:    * No resolved hospital problems.  *    A 70-year-old male with a history of A-fib and anticoagulated with Eliquis is being admitted to the telemetry unit with:    Altered mental status  -Patient up sitting in chair alert and oriented with family at bedside  -CT head negative  -Work-up for stroke so far has been negative including MRI brain, bilateral ultrasound carotids pending  -Risk factor modifications discussed including weight loss, blood pressure control, blood sugar control    UTI with recurrent urinary retention  -Rocephin and Doxy  -Consult urology per family request-started Flomax daily  -IV hydration    Pneumonia  -antibiotics as above pending pancultures  -Negative respiratory panel and strep and Legionella antigens  -DuoNebs  -Supplement O2 demands keeping saturation greater than 92%-currently on room air    Case discussed with family at bedside    Code status: FULL  Requires intermediate level of care  Seaford Andes CLOTILDE Brand CNP    9:45 AM  2/19/2023     Above note edited to reflect my thoughts     I personally saw, examined and provided care for the patient. Radiographs, labs and medication list were reviewed by me independently. The case was discussed in detail and plans for care were established. Review of of Benjamín LENZ CNP, documentation was conducted and revisions were made as appropriate directly by me. I agree with the above documented exam, problem list, and plan of care.      Panchito Michel MD  8:17 PM  2/19/2023

## 2023-02-19 NOTE — PROGRESS NOTES
Patient bladder scanned for 196ml prior to voiding. Voided 50ml of clear yellow urine. PVR bladder scanned for 194ml.

## 2023-02-19 NOTE — ED NOTES
Attempted to call report. Floor nurse will return call in a few minutes.      Shailesh Vadlez, RN  02/18/23 7498

## 2023-02-19 NOTE — ED NOTES
Patient's wife asked me to leave her contact information in the EMR in case anyone needed to get in contact with her:    [phone] (home)   [phone] (cell)    I did not otherwise participate in any portion of this patient's care. I did not see the patient.       Raymon Koroma PA-C  02/18/23 7247

## 2023-02-19 NOTE — CONSULTS
INPATIENT CONSULTATION RECORD FOR  2/19/2023      [facility]  [address] [address]. [place], [address] [facility]  [phone]        REASON FOR CONSULTATION:      Urinary frequency and urgency    HISTORY OF PRESENT ILLNESS:      The patient is a 76 y.o. male patient who presents with altered mental status. The patient has also been having a lot of frequency as well as possible UTI, and pneumonia. The patient did see my partner Dr. Lizzy Ojeda in the past but has not seen him recently. The patient is not on any urologic medicines that I am aware of. He denies any gross hematuria. He does have significant frequency and urgency with nocturia. He denies any history of prostate cancer elevated PSA. He is currently resting comfortably in the chair using his incentive spirometer. He does not have a Whipple catheter and is voiding on his own.       Past Medical History:   Diagnosis Date    Arthritis     Atrial fibrillation (HCC)     Alonzo's esophagus     BPH (benign prostatic hypertrophy)     Diverticulosis     Gastritis     GERD (gastroesophageal reflux disease)     H/O non-insulin dependent diabetes mellitus     Hemorrhoids, internal     Hernia, hiatal     HTN (hypertension)     Hyperlipidemia     Hypertension     Hypoxemia requiring supplemental oxygen     Mobitz type 1 second degree atrioventricular block     Nonalcoholic fatty liver disease     Obesity     Pneumonia     Polyp of colon     Sleep apnea     Type II diabetes mellitus with HbA1C goal to be determined [facility])          Past Surgical History:   Procedure Laterality Date    COLONOSCOPY  3/14/14    ENDOSCOPY, COLON, DIAGNOSTIC      HERNIA REPAIR  01/12/11    KNEE ARTHROPLASTY  02/15/2010    KNEE SURGERY Left     POLYPECTOMY  4/28/11    UPPER GASTROINTESTINAL ENDOSCOPY  4/28/11       Medications Prior to Admission:    Medications Prior to Admission: allopurinol (ZYLOPRIM) 100 MG tablet, allopurinol 100 mg tablet  Take 2 tablets every day by oral route.  [DISCONTINUED] hydrochlorothiazide (HYDRODIURIL) 50 MG tablet, Take 1 tablet by mouth daily (Patient not taking: Reported on 11/29/2022)  [DISCONTINUED] amLODIPine (NORVASC) 5 MG tablet, Take 1 tablet by mouth daily  apixaban (ELIQUIS) 5 MG TABS tablet, Take 1 tablet by mouth 2 times daily  lisinopril (PRINIVIL;ZESTRIL) 20 MG tablet, TAKE ONE TABLET BY MOUTH EVERY DAY  [DISCONTINUED] rosuvastatin (CRESTOR) 5 MG tablet, Take 5 mg by mouth three times a week  (Patient not taking: Reported on 11/29/2022)  [DISCONTINUED] glimepiride (AMARYL) 4 MG tablet, Take 4 mg by mouth every morning (before breakfast) (Patient not taking: Reported on 2/18/2023)  Multiple Vitamins-Minerals (CENTRUM ULTRA MENS) TABS, Take 1 tablet by mouth daily   omeprazole (PRILOSEC) 20 MG capsule, Take 20 mg by mouth daily  metFORMIN (GLUCOPHAGE) 1000 MG tablet, Take 1,000 mg by mouth 2 times daily (with meals). Allergies:    Patient has no known allergies. Social History:    reports that he quit smoking about 45 years ago. His smoking use included cigarettes. He has a 42.00 pack-year smoking history. He quit smokeless tobacco use about 46 years ago. He reports current alcohol use of about 1.0 - 2.0 standard drink per week. He reports that he does not use drugs. Family History:   Non-contributory to this Urological problem  family history includes Cancer in his brother; Diabetes in his father and mother; Early Death in his sister; High Blood Pressure in his mother; No Known Problems in his sister; Other (age of onset: 67) in his father; Stroke in his father; Stroke (age of onset: 72) in his mother.     REVIEW OF SYSTEMS:  Respiratory: negative for cough and hemoptysis  Cardiovascular: negative for chest pain and dyspnea  Gastrointestinal: negative for abdominal pain, diarrhea, nausea and vomiting  Derm: negative for rash and skin lesion(s)  Neurological: negative for seizures and tremors  Endocrine: negative for diabetic symptoms including polydipsia and polyuria  : As above in the HPI, otherwise negative  All other systems negative    PHYSICAL EXAM:    Vitals:  /77   Pulse 96   Temp 99.9 °F (37.7 °C) (Oral)   Resp 18   Ht 6' (1.829 m)   Wt 215 lb 9.6 oz (97.8 kg)   SpO2 96%   BMI 29.24 kg/m²     General:  Awake, alert, oriented X 3. Well developed, well nourished, well groomed. No apparent distress. HEENT:  Normocephalic, atraumatic. Pupils equal, round. No scleral icterus. No conjunctival injection. Normal lips, teeth, and gums. No nasal discharge. Neck:  Supple, no masses. Heart:  RRR  Lungs:  No audible wheezing. Respirations symmetric and non-labored. Abdomen:  soft, nontender, no masses, no organomegaly, no peritoneal signs  Extremities:  No clubbing, cyanosis, or edema  Skin:  Warm and dry, no open lesions or rashes  Neuro:  Cranial nerves 2-12 intact, no focal deficits  Rectal: deferred  Genitalia:  deferred    LABS:    Lab Results   Component Value Date    WBC 12.6 (H) 02/19/2023    HGB 11.5 (L) 02/19/2023    HCT 34.4 (L) 02/19/2023    MCV 99.1 02/19/2023     02/19/2023       Lab Results   Component Value Date    CREATININE 1.3 (H) 02/19/2023       Lab Results   Component Value Date    PSA 8.04 (H) 11/03/2015       Lab Results   Component Value Date    LABURIN Growth not present 11/03/2015       Lab Results   Component Value Date    BC 5 Days- no growth 11/03/2015       Lab Results   Component Value Date    BLOODCULT2 5 Days- no growth 11/03/2015       ASSESSMENT:   77-year-old male with urinary frequency urgency and incomplete emptying. PLAN:    I recommend the patient be started on Flomax if he is stable. He will need a PSA drawn in the outpatient setting. Hopefully Flomax will help with some of his urinary issues. We will need follow-up with Dr. Lupe Galvan once he is out of the hospital.  Urine for culture.    We will continue to follow        Noam Byers MD,   12:08 PM  2/19/2023

## 2023-02-19 NOTE — PROGRESS NOTES
Maxine Ivey notified via voicemail of patient/family request for a urology consult d/t history and following with the patient.

## 2023-02-19 NOTE — PROGRESS NOTES
Comprehensive Nutrition Assessment    Type and Reason for Visit:  Initial, Positive Nutrition Screen    Nutrition Recommendations/Plan:   Continue current diet  Start Glucerna BID to promote oral intake  Will monitor     Malnutrition Assessment:  Malnutrition Status: At risk for malnutrition (Comment) (02/19/23 5534)    Context:  Acute Illness     Findings of the 6 clinical characteristics of malnutrition:  Energy Intake:  Mild decrease in energy intake (Comment) (poor intake reported PTA; difficult to quantify)  Weight Loss:  Unable to assess (d/t lack of wt hx per EMR)     Body Fat Loss:  Unable to assess     Muscle Mass Loss:  Unable to assess    Fluid Accumulation:  No significant fluid accumulation     Strength:  Not Performed    Nutrition Assessment:    pt adm d/t fall/fatigue w/ AMS- possible syncopal episode; PMhx of AFIB, DM, GERD, Alonzo's esophagus; passed bedside swallow eval; possible UTI noted; will start ONS to promote oral intake and monitor. Nutrition Related Findings:    alert; oriented x 3; active BS; no edema; I/O WNL Wound Type: None       Current Nutrition Intake & Therapies:    Average Meal Intake: Unable to assess (no intakes recorded yet this adm)  Average Supplements Intake: None Ordered  ADULT DIET; Regular; 4 carb choices (60 gm/meal)  ADULT ORAL NUTRITION SUPPLEMENT; Breakfast, Lunch; Diabetic Oral Supplement    Anthropometric Measures:  Height: 6' (182.9 cm)  Ideal Body Weight (IBW): 178 lbs (81 kg)    Admission Body Weight: 215 lb 10 oz (97.8 kg) (2/18-SS)  Current Body Weight: 215 lb 10 oz (97.8 kg) (2/18-SS), 121.1 % IBW. Weight Source: Standing Scale  Current BMI (kg/m2): 29.2  Usual Body Weight:  (UTO d/t lack of wt hx per EMR)     Weight Adjustment For: No Adjustment                 BMI Categories: Overweight (BMI 25.0-29. 9)    Estimated Daily Nutrient Needs:  Energy Requirements Based On: Formula  Weight Used for Energy Requirements: Current  Energy (kcal/day): 4707-9266  Weight Used for Protein Requirements: Ideal  Protein (g/day): 1.3-1.5g/tdmDWR=928-232c  Method Used for Fluid Requirements: 1 ml/kcal  Fluid (ml/day): 2000-2200    Nutrition Diagnosis:   Inadequate oral intake related to inadequate protein-energy intake as evidenced by poor intake prior to admission    Nutrition Interventions:   Food and/or Nutrient Delivery: Continue Current Diet, Start Oral Nutrition Supplement (Glucerna BID)  Nutrition Education/Counseling: Education not indicated  Coordination of Nutrition Care: Continue to monitor while inpatient       Goals:     Goals: PO intake 50% or greater, by next RD assessment       Nutrition Monitoring and Evaluation:   Behavioral-Environmental Outcomes: None Identified  Food/Nutrient Intake Outcomes: Food and Nutrient Intake, Supplement Intake  Physical Signs/Symptoms Outcomes: Biochemical Data, Nutrition Focused Physical Findings, Skin, Weight, Chewing or Swallowing, GI Status, Fluid Status or Edema    Discharge Planning:     Too soon to determine     Josh Liriano RD  Contact: [phone]

## 2023-02-20 ENCOUNTER — APPOINTMENT (OUTPATIENT)
Dept: GENERAL RADIOLOGY | Age: 78
DRG: 689 | End: 2023-02-20
Payer: MEDICARE

## 2023-02-20 LAB
ANION GAP SERPL CALCULATED.3IONS-SCNC: 11 MMOL/L (ref 7–16)
ANION GAP SERPL CALCULATED.3IONS-SCNC: 12 MMOL/L (ref 7–16)
BUN BLDV-MCNC: 18 MG/DL (ref 6–23)
BUN BLDV-MCNC: 20 MG/DL (ref 6–23)
CALCIUM SERPL-MCNC: 8.6 MG/DL (ref 8.6–10.2)
CALCIUM SERPL-MCNC: 8.7 MG/DL (ref 8.6–10.2)
CHLORIDE BLD-SCNC: 103 MMOL/L (ref 98–107)
CHLORIDE BLD-SCNC: 103 MMOL/L (ref 98–107)
CO2: 22 MMOL/L (ref 22–29)
CO2: 23 MMOL/L (ref 22–29)
CREAT SERPL-MCNC: 1.3 MG/DL (ref 0.7–1.2)
CREAT SERPL-MCNC: 1.3 MG/DL (ref 0.7–1.2)
EKG ATRIAL RATE: 108 BPM
EKG Q-T INTERVAL: 332 MS
EKG QRS DURATION: 80 MS
EKG QTC CALCULATION (BAZETT): 441 MS
EKG R AXIS: -54 DEGREES
EKG T AXIS: 92 DEGREES
EKG VENTRICULAR RATE: 106 BPM
GFR SERPL CREATININE-BSD FRML MDRD: 56 ML/MIN/1.73
GFR SERPL CREATININE-BSD FRML MDRD: 56 ML/MIN/1.73
GLUCOSE BLD-MCNC: 174 MG/DL (ref 74–99)
GLUCOSE BLD-MCNC: 256 MG/DL (ref 74–99)
MAGNESIUM: 1.8 MG/DL (ref 1.6–2.6)
METER GLUCOSE: 173 MG/DL (ref 74–99)
METER GLUCOSE: 186 MG/DL (ref 74–99)
METER GLUCOSE: 214 MG/DL (ref 74–99)
METER GLUCOSE: 226 MG/DL (ref 74–99)
POTASSIUM REFLEX MAGNESIUM: 3.9 MMOL/L (ref 3.5–5)
POTASSIUM SERPL-SCNC: 4.1 MMOL/L (ref 3.5–5)
PROCALCITONIN: 0.53 NG/ML (ref 0–0.08)
SODIUM BLD-SCNC: 137 MMOL/L (ref 132–146)
SODIUM BLD-SCNC: 137 MMOL/L (ref 132–146)

## 2023-02-20 PROCEDURE — 73562 X-RAY EXAM OF KNEE 3: CPT

## 2023-02-20 PROCEDURE — 2140000000 HC CCU INTERMEDIATE R&B

## 2023-02-20 PROCEDURE — 93010 ELECTROCARDIOGRAM REPORT: CPT | Performed by: INTERNAL MEDICINE

## 2023-02-20 PROCEDURE — 84145 PROCALCITONIN (PCT): CPT

## 2023-02-20 PROCEDURE — 83735 ASSAY OF MAGNESIUM: CPT

## 2023-02-20 PROCEDURE — 36415 COLL VENOUS BLD VENIPUNCTURE: CPT

## 2023-02-20 PROCEDURE — 2580000003 HC RX 258: Performed by: NURSE PRACTITIONER

## 2023-02-20 PROCEDURE — 80048 BASIC METABOLIC PNL TOTAL CA: CPT

## 2023-02-20 PROCEDURE — 73610 X-RAY EXAM OF ANKLE: CPT

## 2023-02-20 PROCEDURE — 6370000000 HC RX 637 (ALT 250 FOR IP): Performed by: STUDENT IN AN ORGANIZED HEALTH CARE EDUCATION/TRAINING PROGRAM

## 2023-02-20 PROCEDURE — 82962 GLUCOSE BLOOD TEST: CPT

## 2023-02-20 PROCEDURE — 92610 EVALUATE SWALLOWING FUNCTION: CPT

## 2023-02-20 PROCEDURE — 6360000002 HC RX W HCPCS: Performed by: NURSE PRACTITIONER

## 2023-02-20 PROCEDURE — 2500000003 HC RX 250 WO HCPCS: Performed by: NURSE PRACTITIONER

## 2023-02-20 PROCEDURE — 6370000000 HC RX 637 (ALT 250 FOR IP): Performed by: NURSE PRACTITIONER

## 2023-02-20 RX ORDER — TAMSULOSIN HYDROCHLORIDE 0.4 MG/1
0.4 CAPSULE ORAL DAILY
Qty: 30 CAPSULE | Refills: 0 | Status: SHIPPED | OUTPATIENT
Start: 2023-02-20

## 2023-02-20 RX ADMIN — Medication 10 ML: at 08:45

## 2023-02-20 RX ADMIN — Medication 1 TABLET: at 08:42

## 2023-02-20 RX ADMIN — ALLOPURINOL 200 MG: 100 TABLET ORAL at 08:42

## 2023-02-20 RX ADMIN — PANTOPRAZOLE SODIUM 40 MG: 40 TABLET, DELAYED RELEASE ORAL at 08:43

## 2023-02-20 RX ADMIN — DOXYCYCLINE 100 MG: 100 INJECTION, POWDER, LYOPHILIZED, FOR SOLUTION INTRAVENOUS at 08:42

## 2023-02-20 RX ADMIN — ACETAMINOPHEN 650 MG: 325 TABLET ORAL at 08:43

## 2023-02-20 RX ADMIN — DOXYCYCLINE 100 MG: 100 INJECTION, POWDER, LYOPHILIZED, FOR SOLUTION INTRAVENOUS at 20:44

## 2023-02-20 RX ADMIN — TAMSULOSIN HYDROCHLORIDE 0.4 MG: 0.4 CAPSULE ORAL at 08:42

## 2023-02-20 RX ADMIN — CEFTRIAXONE 1000 MG: 1 INJECTION, POWDER, FOR SOLUTION INTRAMUSCULAR; INTRAVENOUS at 20:34

## 2023-02-20 RX ADMIN — ACETAMINOPHEN 650 MG: 325 TABLET ORAL at 16:01

## 2023-02-20 RX ADMIN — AMLODIPINE BESYLATE 5 MG: 5 TABLET ORAL at 08:43

## 2023-02-20 RX ADMIN — APIXABAN 5 MG: 5 TABLET, FILM COATED ORAL at 08:43

## 2023-02-20 RX ADMIN — Medication 10 ML: at 20:34

## 2023-02-20 RX ADMIN — APIXABAN 5 MG: 5 TABLET, FILM COATED ORAL at 20:33

## 2023-02-20 ASSESSMENT — PAIN SCALES - GENERAL
PAINLEVEL_OUTOF10: 0
PAINLEVEL_OUTOF10: 0
PAINLEVEL_OUTOF10: 4

## 2023-02-20 ASSESSMENT — PAIN DESCRIPTION - LOCATION: LOCATION: KNEE

## 2023-02-20 NOTE — CARE COORDINATION
Transition of care: Urology consulted. Met with pt and pt's wife, Pratibha, in room. Pt is hard of hearing. Pt lives with his wife in a 1 story home. Has 3 stairs with 1 rail to garage entrance of home. Independent with ADLs and drives. [facility] [address] He sees Dr Ramirez there. DME- wheelchair, cane, transport wheelchair FWW, standard walker, shower chair and raised toilet seat. Plan is to return home at discharge. Denies any needs for home. Pratibha said she is currently receiving chemo for breast ca. PCP is Dr Rustam Pastrana and pharmacy is [facility]. Sw/cm will follow.     Case Management Assessment  Initial Evaluation    Date/Time of Evaluation: 2/20/2023 2:54 PM  Assessment Completed by: Fabiola Dubose RN    If patient is discharged prior to next notation, then this note serves as note for discharge by case management.    Patient Name: Manny Ceballos                   YOB: 1945  Diagnosis: Altered mental status [R41.82]  MARCELLA (acute kidney injury) (HCC) [N17.9]  Acute cystitis without hematuria [N30.00]  Altered mental status, unspecified altered mental status type [R41.82]  Pneumonia due to infectious organism, unspecified laterality, unspecified part of lung [J18.9]                   Date / Time: 2/18/2023  6:08 PM    Patient Admission Status: Inpatient   Readmission Risk (Low < 19, Mod (19-27), High > 27): Readmission Risk Score: 10.9    Current PCP: Rustam Pastrana MD  PCP verified by ? Yes    Chart Reviewed: Yes      History Provided by: Spouse, Patient  Patient Orientation: Alert and Oriented    Patient Cognition: Alert    Hospitalization in the last 30 days (Readmission):  No    If yes, Readmission Assessment in  Navigator will be completed.    Advance Directives:      Code Status: Full Code   Patient's Primary Decision Maker is:        Discharge Planning:    Patient lives with: Spouse/Significant Other Type of Home: House  Primary Care Giver:   Patient Support Systems include: Family Members   Current Financial resources:    Current community resources:    Current services prior to admission: None            Current DME:              Type of Home Care services:  None    ADLS  Prior functional level: Independent in ADLs/IADLs  Current functional level: Independent in ADLs/IADLs    PT AM-PAC:   /24  OT AM-PAC: 15 /24    Family can provide assistance at DC: Yes  Would you like Case Management to discuss the discharge plan with any other family members/significant others, and if so, who?  No  Plans to Return to Present Housing: Yes  Potential Assistance needed at discharge: N/A  Patient expects to discharge to: [address] [place] [address] for transportation at discharge:      Financial    Payor: [address] [place] [address] / Plan: MEDICARE PART A AND B / Product Type: *No Product type* /         Aries Garcia [address] [place] [address] [address] [facility] [address]  Phone: [phone] Fax: [phone]        The Plan for Transition of Care is related to the following treatment goals of Altered mental status [R41.82]  MARCELLA (acute kidney injury) ([facility] [address] [N17.9]  Acute cystitis without hematuria [N30.00]  Altered mental status, unspecified altered mental status type [R41.82]  Pneumonia due to infectious organism, unspecified laterality, unspecified part of lung [J18.9]        The Patient and/or Patient Representative Agree with the Discharge Plan? yes     Marlene Brewster RN  Case Management Department  Ph: [phone]

## 2023-02-20 NOTE — DISCHARGE INSTRUCTIONS
Call upon discharge to schedule a follow-up visit with Stephy Infante/Darcie/Vanessa ([facility]) at [phone]

## 2023-02-20 NOTE — PROGRESS NOTES
SPEECH/LANGUAGE PATHOLOGY  CLINICAL ASSESSMENT OF SWALLOWING FUNCTION   and PLAN OF CARE  PATIENT NAME:  Roxane Hathaway  (male)     MRN:  [id number]    :  1945  (76 y.o.)  STATUS:  Inpatient: Room 6522/6522-A    TODAY'S DATE:  2023  REFERRING PROVIDER:  CLOTILDE Weston CNP  SPECIFIC PROVIDER ORDER: SLP swallowing-dysphagia evaluation and treatment Date of order:  23  REASON FOR REFERRAL: dysphagia   EVALUATING THERAPIST: CANELO Goldman                 ASSESSMENT:    DYSPHAGIA DIAGNOSIS:   Clinical indicators of functional swallow for age/premorbid functioning      DIET RECOMMENDATIONS:  Regular consistency solids (IDDSI level 7) with  thin liquids (IDDSI level 0)     FEEDING RECOMMENDATIONS:     Assistance level:  No assistance needed      Compensatory strategies recommended: No strategies are recommended at this time      Discussed recommendations with nursing?: No secondary to no diet/liquid change recommended     SPEECH THERAPY  PLAN OF CARE   The dysphagia POC is established based on physician order, dysphagia diagnosis and results of clinical assessment     Dysphagia therapy is not recommended     Conditions Requiring Skilled Therapeutic Intervention for dysphagia:    not applicable    Specific dysphagia interventions to include:     Not applicable    Specific instructions for next treatment:  not applicable   Patient Treatment Goals:    Short Term Goals:  Not applicable no therapy warranted     Long Term Goals:   Not applicable no therapy warranted      Patient/family Goal:    not applicable                    ADMITTING DIAGNOSIS: Altered mental status [R41.82]  MARCELLA (acute kidney injury) ([facility] [address] [N17.9]  Acute cystitis without hematuria [N30.00]  Altered mental status, unspecified altered mental status type [R41.82]  Pneumonia due to infectious organism, unspecified laterality, unspecified part of lung [J18.9]    VISIT DIAGNOSIS:   Visit Diagnoses         Codes    Pneumonia due to infectious organism, unspecified laterality, unspecified part of lung    -  Primary J18.9    Acute cystitis without hematuria     N30.00    MARCELLA (acute kidney injury) ([facility] [address]     N17.9             PATIENT REPORT/COMPLAINT: denies difficulty swallowing  nursing not available at time of evaluation chart reviewed and patient is not NPO for any procedures per current documentation. PRIOR LEVEL OF SWALLOW FUNCTION:    PAST HISTORY OF DYSPHAGIA?: none reported    Home diet: Regular consistency solids (IDDSI level 7) with  thin liquids (IDDSI level 0)    Current Diet Order: ADULT DIET; Regular; 4 carb choices (60 gm/meal)  ADULT ORAL NUTRITION SUPPLEMENT; Breakfast, Lunch; Diabetic Oral Supplement    PROCEDURE:  Consistencies Administered During the Evaluation   Liquids: thin liquid   Solids:  pureed foods and soft solid foods      Method of Intake:   cup, straw, spoon  Self fed      Position:   Seated, upright    CLINICAL ASSESSMENT  Oral Stage: The oral stage of swallowing was within functional limits      Pharyngeal Stage:    No signs of aspiration were noted during this evaluation however, silent aspiration cannot be ruled out at bedside. If silent aspiration is suspected, a Videofluoroscopic Study of Swallowing (MBS) is recommended and requires a physician order. Cognition:   Hard of hearing    Oral Peripheral Examination   Adequate lingual/labial strength     Current Respiratory Status    room air     Parameters of Speech Production  Respiration:  Adequate for speech production  Quality:   Within functional limits  Intensity: Within functional limits    Volitional Swallow: Present    Volitional Cough:    Present    Pain: No pain reported. EDUCATION:   The Speech Language Pathologist (SLP) completed education regarding results of evaluation and that intervention is not warranted at this time.   Learner: Patient  Education:  Reviewed results and recommendations of this evaluation  Evaluation of Education: Verbalizes understanding    This plan will be re-evaluated and revised in 1 week  if warranted. CPT code:  17439  bedside swallow eval      [x]The admitting diagnosis and active problem list, have been reviewed prior to initiation of this evaluation.         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

## 2023-02-20 NOTE — PROGRESS NOTES
Hospitalist Progress Note      PCP: Gely Renee MD    Date of Admission: 2/18/2023        Hospital Course:  Patient states that he fell in a parking lot but denies striking his head, once patient returned home he felt very short of breath and fatigued and was lowered to the ground** FOUND TO  HAVE A UTI AND PNEUMONIA.    HE IS ON ROCEPHIN AND DOXY,  URINE POS FOR GNR  SENSITIVITY TO FOLLOW      Subjective:  FEELING BETTER , LEFT KNEE AND LEFT ANKLE PAIN SINCE FALL          Medications:  Reviewed    Infusion Medications    sodium chloride      dextrose      sodium chloride 50 mL/hr at 02/19/23 0030     Scheduled Medications    sodium chloride flush  5-40 mL IntraVENous 2 times per day    allopurinol  200 mg Oral Daily    amLODIPine  5 mg Oral Daily    apixaban  5 mg Oral BID    insulin lispro  0-4 Units SubCUTAneous TID WC    insulin lispro  0-4 Units SubCUTAneous Nightly    multivitamin  1 tablet Oral Daily    pantoprazole  40 mg Oral Daily    cefTRIAXone (ROCEPHIN) IV  1,000 mg IntraVENous Q24H    doxycycline (VIBRAMYCIN) IV  100 mg IntraVENous Q12H    tamsulosin  0.4 mg Oral Daily     PRN Meds: sodium chloride flush, sodium chloride, acetaminophen **OR** acetaminophen, bisacodyl, glucose, dextrose bolus **OR** dextrose bolus, glucagon (rDNA), dextrose, ipratropium-albuterol, guaiFENesin      Intake/Output Summary (Last 24 hours) at 2/20/2023 1537  Last data filed at 2/20/2023 1526  Gross per 24 hour   Intake 1440 ml   Output 550 ml   Net 890 ml       Exam:    /79   Pulse 84   Temp 98.6 °F (37 °C) (Temporal)   Resp 18   Ht 6' (1.829 m)   Wt 216 lb (98 kg)   SpO2 97%   BMI 29.29 kg/m²           Gen:  WELL DEVELOPED  HEENT: NC/AT, moist mucous membranes, no oropharyngeal erythema or exudate  Neck: supple, trachea midline, no anterior cervical or SC LAD  Heart:  IRREG  Lungs:  CTA  bilaterally,  Abd: bowel sounds present, soft, nontender, nondistended, no masses  Extrem:  No clubbing, cyanosis, NO edema  Skin: no rashes or lesions  Psych: A & O x3  Neuro: grossly intact, moves all four extremities. Labs:   Recent Labs     02/18/23  1851 02/19/23  0610   WBC 12.8* 12.6*   HGB 11.8* 11.5*   HCT 35.3* 34.4*    137     Recent Labs     02/19/23  0610 02/19/23 2012 02/20/23  0548 02/20/23  1037     --  137 137   K 3.9 4.0 3.9 4.1     --  103 103   CO2 23  --  23 22   BUN 20 -- 18 20   CREATININE 1.3*  --  1.3* 1.3*   CALCIUM 8.8  --  8.6 8.7     Recent Labs     02/18/23 1851   AST 20   ALT 21   BILITOT 0.5   ALKPHOS 64     No results for input(s): INR in the last 72 hours. No results for input(s): [place] in the last 72 hours. Recent Labs     02/18/23 1851   AST 20   ALT 21   BILITOT 0.5   ALKPHOS 64     No results for input(s): LACTA in the last 72 hours. No results found for: Velna Palma  No results found for: AMMONIA    Assessment:    Active Hospital Problems    Diagnosis Date Noted    Altered mental status [R41.82] 02/18/2023     Priority: Medium    UTI (urinary tract infection) [N39.0] 02/18/2023     Priority: Medium    Pneumonia [J18.9] 10/07/2014   LEFT KNEE AND LEFT ANKLE PAIN SINCE FALL  FALL  II DM   HTN   PAF       Plan:   ELIQUIS   DOXY  ROCEPHIN   XRAY   NORVASC         DVT Prophylaxis: ELIQUIS  Diet: ADULT DIET;  Regular; 4 carb choices (60 gm/meal)  ADULT ORAL NUTRITION SUPPLEMENT; Breakfast, Lunch; Diabetic Oral Supplement  Code Status: Full Code    PT/OT Eval Status: ORDERED    Dispo -  HOME      Electronically signed by Bernadette Perea DO on 2/20/2023 at 3:37 PM [facility]

## 2023-02-20 NOTE — PROGRESS NOTES
2/20/2023 1:31 PM  Shameka Trotter  [id number]    Subjective:    Sitting up in a chair  Voiding comfortably  Wife present  His urgency has improved    Review of Systems  Constitutional: No fever or chills   Respiratory: negative for cough and hemoptysis  Cardiovascular: negative for chest pain and dyspnea  Gastrointestinal: negative for abdominal pain, diarrhea, nausea and vomiting   : See above  Derm: negative for rash and skin lesion(s)  Neurological: negative for seizures and tremors  Musculoskeletal: Negative    Psychiatric: Negative   All other reviews are negative      Scheduled Meds:   sodium chloride flush  5-40 mL IntraVENous 2 times per day    allopurinol  200 mg Oral Daily    amLODIPine  5 mg Oral Daily    apixaban  5 mg Oral BID    insulin lispro  0-4 Units SubCUTAneous TID WC    insulin lispro  0-4 Units SubCUTAneous Nightly    multivitamin  1 tablet Oral Daily    pantoprazole  40 mg Oral Daily    cefTRIAXone (ROCEPHIN) IV  1,000 mg IntraVENous Q24H    doxycycline (VIBRAMYCIN) IV  100 mg IntraVENous Q12H    tamsulosin  0.4 mg Oral Daily       Objective:  Vitals:    02/20/23 1132   BP: 139/79   Pulse: 84   Resp: 18   Temp: 98.6 °F (37 °C)   SpO2: 97%         Allergies: Patient has no known allergies.     General Appearance: alert and oriented to person, place and time and in no acute distress  Skin: no rash or erythema  Head: normocephalic and atraumatic  Pulmonary/Chest: normal air movement, no respiratory distress  Abdomen: soft, non-tender, non-distended  Genitourinary: No Whipple  Extremities: no cyanosis, clubbing or edema         Labs:     Recent Labs     02/20/23  1037      K 4.1      CO2 22   BUN 20   CREATININE 1.3*   GLUCOSE 256*   CALCIUM 8.7       Lab Results   Component Value Date/Time    HGB 11.5 02/19/2023 06:10 AM    HCT 34.4 02/19/2023 06:10 AM       Lab Results   Component Value Date    PSA 8.04 (H) 11/03/2015         Assessment/Plan:  BPH  Incomplete bladder emptying  UTI      Urine culture evaluating for gram-negative rods, identification and sensitivities pending  Continue the antibiotics per primary care  Continue Flomax  Monitor the PVRs  Leave without a Whipple unless greater than 300 or uncomfortable  Will need a repeat PSA as an outpatient  Hold on any further acute  interventions at this time  Please call us with further questions or concerns    Loni Vick, APRN - CNP   [facility]  Urology

## 2023-02-20 NOTE — PROGRESS NOTES
Patient is complaining of L knee pain , post fall , hx of knee replacement 20yrs prior.   Family requesting xray

## 2023-02-20 NOTE — PATIENT CARE CONFERENCE
[facility] Quality Flow/Interdisciplinary Rounds Progress Note        Quality Flow Rounds held on February 20, 2023    Disciplines Attending:  Bedside Nurse, , , and Nursing Unit Leadership    Jair Wakefield was admitted on 2/18/2023  6:08 PM    Anticipated Discharge Date:       Disposition:    Ronak Score:  Ronak Scale Score: 19    Readmission Risk              Risk of Unplanned Readmission:  11           Discussed patient goal for the day, patient clinical progression, and barriers to discharge.   The following Goal(s) of the Day/Commitment(s) have been identified:  Diagnostics - Report Results and Labs - Report Results and keep patient and family informed      Maik Junior RN  February 20, 2023

## 2023-02-20 NOTE — ACP (ADVANCE CARE PLANNING)
Advance Care Planning   Healthcare Decision Maker:    Primary Decision Maker: Pratibha Ceballos - Spouse - [phone]    Secondary Decision Maker: Alex Ochoa - Child - [phone]    Click here to complete Healthcare Decision Makers including selection of the Healthcare Decision Maker Relationship (ie \"Primary\").

## 2023-02-21 VITALS
TEMPERATURE: 100.8 F | DIASTOLIC BLOOD PRESSURE: 77 MMHG | HEART RATE: 99 BPM | RESPIRATION RATE: 20 BRPM | OXYGEN SATURATION: 96 % | HEIGHT: 72 IN | WEIGHT: 217.8 LBS | SYSTOLIC BLOOD PRESSURE: 158 MMHG | BODY MASS INDEX: 29.5 KG/M2

## 2023-02-21 LAB
ANION GAP SERPL CALCULATED.3IONS-SCNC: 15 MMOL/L (ref 7–16)
BUN BLDV-MCNC: 20 MG/DL (ref 6–23)
CALCIUM SERPL-MCNC: 8.9 MG/DL (ref 8.6–10.2)
CHLORIDE BLD-SCNC: 99 MMOL/L (ref 98–107)
CO2: 21 MMOL/L (ref 22–29)
CREAT SERPL-MCNC: 1.3 MG/DL (ref 0.7–1.2)
GFR SERPL CREATININE-BSD FRML MDRD: 56 ML/MIN/1.73
GLUCOSE BLD-MCNC: 268 MG/DL (ref 74–99)
METER GLUCOSE: 208 MG/DL (ref 74–99)
ORGANISM: ABNORMAL
POTASSIUM SERPL-SCNC: 4.2 MMOL/L (ref 3.5–5)
SODIUM BLD-SCNC: 135 MMOL/L (ref 132–146)
URINE CULTURE, ROUTINE: ABNORMAL

## 2023-02-21 PROCEDURE — 2500000003 HC RX 250 WO HCPCS: Performed by: NURSE PRACTITIONER

## 2023-02-21 PROCEDURE — 6370000000 HC RX 637 (ALT 250 FOR IP): Performed by: NURSE PRACTITIONER

## 2023-02-21 PROCEDURE — 82962 GLUCOSE BLOOD TEST: CPT

## 2023-02-21 PROCEDURE — 36415 COLL VENOUS BLD VENIPUNCTURE: CPT

## 2023-02-21 PROCEDURE — 2580000003 HC RX 258: Performed by: NURSE PRACTITIONER

## 2023-02-21 PROCEDURE — 6370000000 HC RX 637 (ALT 250 FOR IP): Performed by: STUDENT IN AN ORGANIZED HEALTH CARE EDUCATION/TRAINING PROGRAM

## 2023-02-21 PROCEDURE — 97161 PT EVAL LOW COMPLEX 20 MIN: CPT

## 2023-02-21 PROCEDURE — 97530 THERAPEUTIC ACTIVITIES: CPT

## 2023-02-21 PROCEDURE — 80048 BASIC METABOLIC PNL TOTAL CA: CPT

## 2023-02-21 RX ORDER — LEVOFLOXACIN 500 MG/1
500 TABLET, FILM COATED ORAL DAILY
Status: DISCONTINUED | OUTPATIENT
Start: 2023-02-21 | End: 2023-02-21 | Stop reason: HOSPADM

## 2023-02-21 RX ORDER — LEVOFLOXACIN 500 MG/1
500 TABLET, FILM COATED ORAL DAILY
Qty: 10 TABLET | Refills: 0 | Status: SHIPPED | OUTPATIENT
Start: 2023-02-21 | End: 2023-03-03

## 2023-02-21 RX ORDER — AMLODIPINE BESYLATE 5 MG/1
5 TABLET ORAL DAILY
Qty: 90 TABLET | Refills: 3 | Status: SHIPPED | OUTPATIENT
Start: 2023-02-21

## 2023-02-21 RX ORDER — GUAIFENESIN 200 MG/10ML
200 LIQUID ORAL EVERY 6 HOURS PRN
Qty: 240 ML | Refills: 0 | Status: SHIPPED | OUTPATIENT
Start: 2023-02-21

## 2023-02-21 RX ORDER — GLIMEPIRIDE 4 MG/1
4 TABLET ORAL
Qty: 30 TABLET | Refills: 3 | Status: SHIPPED | OUTPATIENT
Start: 2023-02-21

## 2023-02-21 RX ADMIN — APIXABAN 5 MG: 5 TABLET, FILM COATED ORAL at 08:35

## 2023-02-21 RX ADMIN — Medication 1 TABLET: at 08:34

## 2023-02-21 RX ADMIN — DOXYCYCLINE 100 MG: 100 INJECTION, POWDER, LYOPHILIZED, FOR SOLUTION INTRAVENOUS at 08:38

## 2023-02-21 RX ADMIN — ALLOPURINOL 200 MG: 100 TABLET ORAL at 08:34

## 2023-02-21 RX ADMIN — Medication 10 ML: at 08:40

## 2023-02-21 RX ADMIN — ACETAMINOPHEN 650 MG: 325 TABLET ORAL at 08:35

## 2023-02-21 RX ADMIN — AMLODIPINE BESYLATE 5 MG: 5 TABLET ORAL at 08:35

## 2023-02-21 RX ADMIN — INSULIN LISPRO 1 UNITS: 100 INJECTION, SOLUTION INTRAVENOUS; SUBCUTANEOUS at 08:39

## 2023-02-21 RX ADMIN — PANTOPRAZOLE SODIUM 40 MG: 40 TABLET, DELAYED RELEASE ORAL at 08:35

## 2023-02-21 RX ADMIN — TAMSULOSIN HYDROCHLORIDE 0.4 MG: 0.4 CAPSULE ORAL at 08:34

## 2023-02-21 ASSESSMENT — PAIN SCALES - GENERAL
PAINLEVEL_OUTOF10: 0
PAINLEVEL_OUTOF10: 0

## 2023-02-21 NOTE — PROGRESS NOTES
Hospitalist Progress Note      PCP: Krysten Ding MD    Date of Admission: 2/18/2023        Hospital Course:   Patient states that he fell in a parking lot but denies striking his head, once patient returned home he felt very short of breath and fatigued and was lowered to the ground** FOUND TO  HAVE A UTI AND PNEUMONIA. HE IS ON ROCEPHIN AND DOXY,  URINE POS FOR GNR   E COLI  will dc home on levaquin xray of left knee and ankle,  no fracture small effusion         Medications:  Reviewed    Infusion Medications   Scheduled Medications   PRN Meds:       Intake/Output Summary (Last 24 hours) at 2/21/2023 1431  Last data filed at 2/21/2023 0425  Gross per 24 hour   Intake 2394.22 ml   Output 500 ml   Net 1894.22 ml       Exam:    BP (!) 158/77   Pulse 99   Temp (!) 100.8 °F (38.2 °C) (Temporal)   Resp 20   Ht 6' (1.829 m)   Wt 217 lb 12.8 oz (98.8 kg)   SpO2 96%   BMI 29.54 kg/m²       Left before he could be seen          Labs:   Recent Labs     02/18/23  1851 02/19/23  0610   WBC 12.8* 12.6*   HGB 11.8* 11.5*   HCT 35.3* 34.4*    137     Recent Labs     02/20/23  0548 02/20/23  1037 02/21/23  0818    137 135   K 3.9 4.1 4.2    103 99   CO2 23 22 21*   BUN 18 20 20   CREATININE 1.3* 1.3* 1.3*   CALCIUM 8.6 8.7 8.9     Recent Labs     02/18/23  1851   AST 20   ALT 21   BILITOT 0.5   ALKPHOS 64     No results for input(s): INR in the last 72 hours. No results for input(s): Normajean [place] in the last 72 hours. Recent Labs     02/18/23  1851   AST 20   ALT 21   BILITOT 0.5   ALKPHOS 64     No results for input(s): LACTA in the last 72 hours.   No results found for: Tildon Saldivar  No results found for: AMMONIA    Assessment:    Active Hospital Problems    Diagnosis Date Noted    Altered mental status [R41.82] 02/18/2023     Priority: Medium    UTI (urinary tract infection) [N39.0] 02/18/2023     Priority: Medium    Pneumonia [J18.9] 10/07/2014   LEFT KNEE AND LEFT ANKLE PAIN SINCE FALL  FALL  II DM   HTN   PAF       Plan: dc home  Electronically signed by Ebalexia Her, DO on 2/21/2023 at 2:31 PM [facility]

## 2023-02-21 NOTE — PROGRESS NOTES
Physical Therapy  Physical Therapy Initial Assessment     Name: Nura Mcnamara  : 1945  MRN: [id number]      Date of Service: 2023    Evaluating PT:  Karma Gomez, PT, DPT [id number]    Room #:  0134/4633-A  Diagnosis:  Altered mental status [R41.82]  MARCELLA (acute kidney injury) ([facility] [address] [N17.9]  Acute cystitis without hematuria [N30.00]  Altered mental status, unspecified altered mental status type [R41.82]  Pneumonia due to infectious organism, unspecified laterality, unspecified part of lung [J18.9]  PMHx/PSHx:    Past Medical History:   Diagnosis Date    Arthritis     Atrial fibrillation ([facility] [address]     Alonzo's esophagus     BPH (benign prostatic hypertrophy)     Diverticulosis     Gastritis     GERD (gastroesophageal reflux disease)     H/O non-insulin dependent diabetes mellitus     Hemorrhoids, internal     Hernia, hiatal     HTN (hypertension)     Hyperlipidemia     Hypertension     Hypoxemia requiring supplemental oxygen     Mobitz type 1 second degree atrioventricular block     Nonalcoholic fatty liver disease     Obesity     Pneumonia     Polyp of colon     Sleep apnea     Type II diabetes mellitus with HbA1C goal to be determined [facility])      Procedure/Surgery:  None  Precautions:  Falls, bed/chair alarm, [place], tremors, recent R TKA (2022)  Equipment Needs:  TBD    SUBJECTIVE:    Pt lives with wife in a 1 story home with 3 stairs to enter and 1 rail. Pt ambulated with cane and was independent PTA. OBJECTIVE:   Initial Evaluation  Date: 23 Treatment Short Term/ Long Term   Goals   AM-PAC 6 Clicks      Was pt agreeable to Eval/treatment?  Yes     Does pt have pain? 8-9/10 L ankle pain     Bed Mobility  Rolling: NT  Supine to sit: SBA with HOB elevated  Sit to supine: NT  Scooting: SBA  Mod Independent   Transfers Sit to stand: Tracy  Stand to sit: Tracy  Stand pivot: [place] with [address]  Mod Independent with [address]   Ambulation   30 feet with Min A with [address]  >150 feet with Mod Independent with [address]   Stair negotiation: ascended and descended NT  >4 steps with 1 rail Mod Independent   ROM BUE:  WFL  BLE:  WFL     Strength BUE:  WFL  BLE:  4/5 grossly  Increase by 1/3 MMT grade   Balance Sitting EOB:  SBA  Dynamic Standing:  Tracy with [employer]  Sitting EOB:  Independent  Dynamic Standing: Mod Independent with [employer]     Pt is A & O x 4  Sensation:  no reported paresthesias  Edema:  R knee and mild L ankle    Therapeutic Exercises:  NA    Patient education  Pt educated on safety    Patient response to education:   Pt verbalized understanding Pt demonstrated skill Pt requires further education in this area   x x x     ASSESSMENT:    Conditions Requiring Skilled Therapeutic Intervention:    [x]Decreased strength     []Decreased ROM  [x]Decreased functional mobility  [x]Decreased balance   [x]Decreased endurance   [x]Decreased posture  []Decreased sensation  []Decreased coordination   []Decreased vision  []Decreased safety awareness   [x]Increased pain       Comments:  Pt was in bed upon arrival, agreeable to initial evaluation. Increased time required for all mobility. Pt was limited by L ankle and knee pain at this time. Pt ambulated with decreased gait speed, unsteadiness and flexed posture. Cues provided for [employer] management and approximation. Pt was left in chair with all needs met and call light in reach. Chair alarm on. Treatment:  Patient practiced and was instructed in the following treatment:    Bed mobility training - pt given verbal cues to facilitate proper sequencing and safety during supine>sit. Sitting EOB for >3 minutes for upright tolerance, postural awareness and BLE ROM  Transfer training - pt was given verbal and tactile cues to facilitate proper hand placement, technique and safety during sit to stand, stand to sit and stand pivot transfers as well as provided with physical assistance.    Gait training- pt was given verbal and tactile cues to facilitate safety, balance and use of WW during ambulation as well as provided with physical assistance. Pt's/ family goals   1. Return home    Prognosis is good for reaching above PT goals. Patient and or family understand(s) diagnosis, prognosis, and plan of care:   [x] Yes [] No      PHYSICAL THERAPY PLAN OF CARE:    PT POC is established based on physician order and patient diagnosis     Referring provider/PT Order:  April PhillipStephieChuck APRSHASHANK - CNP /02/19/23 0015 PT eval and treat  Diagnosis:  Altered mental status [R41.82]  MARCELLA (acute kidney injury) ([facility] [address] [N17.9]  Acute cystitis without hematuria [N30.00]  Altered mental status, unspecified altered mental status type [R41.82]  Pneumonia due to infectious organism, unspecified laterality, unspecified part of lung [J18.9]  Specific instructions for next treatment:  Progress activity    Current Treatment Recommendations:     [x] Strengthening to improve independence with functional mobility   [] ROM to improve independence with functional mobility   [x] Balance Training to improve static/dynamic balance and to reduce fall risk  [x] Endurance Training to improve activity tolerance during functional mobility   [x] Transfer Training to improve safety and independence with all functional transfers   [x] Gait Training to improve gait mechanics, endurance and asses need for appropriate assistive device  [x] Stair Training in preparation for safe discharge home and/or into the community   [] Positioning to prevent skin breakdown and contractures  [x] Safety and Education Training   [x] Patient/Caregiver Education   [] HEP  [] Other     PT long term treatment goals are located in above grid    Frequency of treatments: 2-5x/week x 1-2 weeks.     Time in  0739  Time out  0800    Total Treatment Time  8 minutes     Evaluation Time includes thorough review of current medical information, gathering information on past medical history/social history and prior level of function, completion of standardized testing/informal observation of tasks, assessment of data and education on plan of care and goals.     CPT codes:  [x] Low Complexity PT evaluation 56743  [] Moderate Complexity PT evaluation 49520  [] High Complexity PT evaluation 04944  [] PT Re-evaluation 68341  [] Gait training 35600 - minutes  [] Manual therapy 72740 - minutes  [x] Therapeutic activities 99685 8 minutes  [] Therapeutic exercises 78190 - minutes  [] Neuromuscular reeducation 57484 - minutes     Debbie Olp, PT, DPT  [id number]

## 2023-02-21 NOTE — PROGRESS NOTES
Patient ready for DC.  IV's and Heart Monitor removed. AVS completed and reviewed with patient and wife. No questions or concerns at this time. Verified preferred pharmacy. Patient to be transported home by wife.

## 2023-02-21 NOTE — PATIENT CARE CONFERENCE
[facility] Quality Flow/Interdisciplinary Rounds Progress Note        Quality Flow Rounds held on February 21, 2023    Disciplines Attending:  Bedside Nurse, , , and Nursing Unit Leadership    Carmela River was admitted on 2/18/2023  6:08 PM    Anticipated Discharge Date:       Disposition:    Ronak Score:  Ronak Scale Score: 19    Readmission Risk              Risk of Unplanned Readmission:  11           Discussed patient goal for the day, patient clinical progression, and barriers to discharge.   The following Goal(s) of the Day/Commitment(s) have been identified:  Labs - Report Results      Aline Petty RN  February 21, 2023

## 2023-02-23 LAB
BLOOD CULTURE, ROUTINE: NORMAL
BLOOD CULTURE, ROUTINE: NORMAL

## 2023-02-24 NOTE — DISCHARGE SUMMARY
Hospitalist Discharge Summary    Patient ID: Manpreet Bolden   Patient : 1945  Patient's PCP: Susie Glez MD    Admit Date: 2023   Admitting Physician: Annemarie uGzman MD    Discharge Date:  2023   Discharge Physician: Tim Butts DO   Discharge Condition: Stable  Discharge Disposition: Home      Discharge Diagnoses: Active Hospital Problems    Diagnosis Date Noted    Altered mental status [R41.82] 2023     Priority: Medium    UTI (urinary tract infection) [N39.0] 2023     Priority: Medium    Pneumonia [J18.9] 10/07/2014           Hospital course in brief:    Patient states that he fell in a parking lot but denies striking his head, once patient returned home he felt very short of breath and fatigued and was lowered to the ground** FOUND TO  HAVE A UTI AND PNEUMONIA. HE IS ON ROCEPHIN AND DOXY,  URINE POS FOR GNR   E COLI  will dc home on levaquin xray of left knee and ankle,  no fracture small effusion          PHYSICAL EXAM:    BP (!) 158/77   Pulse 99   Temp (!) 100.8 °F (38.2 °C) (Temporal)   Resp 20   Ht 6' (1.829 m)   Wt 217 lb 12.8 oz (98.8 kg)   SpO2 96%   BMI 29.54 kg/m²       Left before he could be seen  Prior to Admission medications    Medication Sig Start Date End Date Taking?  Authorizing Provider   amLODIPine (NORVASC) 5 MG tablet Take 1 tablet by mouth daily 23  Yes Tim Butts, DO   guaiFENesin (ROBITUSSIN) 100 MG/5ML liquid Take 10 mLs by mouth every 6 hours as needed for Cough or Congestion 23  Yes Tim Butts, DO   levoFLOXacin (LEVAQUIN) 500 MG tablet Take 1 tablet by mouth daily for 10 days 2/21/23 3/3/23 Yes Segundo Arizmendi, DO   glimepiride (AMARYL) 4 MG tablet Take 1 tablet by mouth every morning (before breakfast) 23  Yes Owen Wan, DO   tamsulosin [facility]) 0.4 MG capsule Take 1 capsule by mouth daily 23  Yes Evelia Dickens, APRN - CNP   allopurinol (ZYLOPRIM) 100 MG tablet allopurinol 100 mg tablet   Take 2 tablets every day by oral route. Historical Provider, MD   apixaban (ELIQUIS) 5 MG TABS tablet Take 1 tablet by mouth 2 times daily 2/26/19   New Baez MD   Multiple Vitamins-Minerals (CENTRUM ULTRA MENS) TABS Take 1 tablet by mouth daily     Historical Provider, MD   omeprazole (PRILOSEC) 20 MG capsule Take 20 mg by mouth daily    Historical Provider, MD   metFORMIN (GLUCOPHAGE) 1000 MG tablet Take 1,000 mg by mouth 2 times daily (with meals). Historical Provider, MD       Consults:   IP CONSULT TO INTERNAL MEDICINE  IP CONSULT TO SOCIAL WORK  IP CONSULT TO UROLOGY            Discharge Instructions / Follow up:    No future appointments. Continued appropriate risk factor modification of blood pressure, diabetes and serum lipids will remain essential to reducing risk of future atherosclerotic development    Activity: activity as tolerated    Significant labs:  CBC:   No results for input(s): WBC, RBC, HGB, HCT, MCV, RDW, PLT in the last 72 hours. BMP:   Recent Labs     02/21/23  0818      K 4.2   CL 99   CO2 21*   BUN 20   CREATININE 1.3*     LFT:  No results for input(s): PROT, ALB, ALKPHOS, ALT, AST, BILITOT, AMYLASE, LIPASE in the last 72 hours. PT/INR: No results for input(s): INR, APTT in the last 72 hours. BNP: No results for input(s): BNP in the last 72 hours.   Hgb A1C:   Lab Results   Component Value Date    LABA1C 6.3 (H) 02/19/2023     Folate and B12: No results found for: [id number], No results found for: FOLATE  Thyroid Studies:   Lab Results   Component Value Date    TSH 3.190 10/08/2014       Urinalysis:    Lab Results   Component Value Date/Time    NITRU POSITIVE 02/18/2023 06:51 PM    WBCUA >20 02/18/2023 06:51 PM    BACTERIA MANY 02/18/2023 06:51 PM    RBCUA 0-1 02/18/2023 06:51 PM    BLOODU TRACE-INTACT 02/18/2023 06:51 PM    SPECGRAV >=1.030 02/18/2023 06:51 PM    GLUCOSEU Negative 02/18/2023 06:51 PM       Imaging:  XR KNEE LEFT (3 VIEWS)    Result Date: 2/20/2023  EXAMINATION: THREE XRAY VIEWS OF THE LEFT KNEE 2/20/2023 3:14 pm COMPARISON: None. HISTORY: ORDERING SYSTEM PROVIDED HISTORY: fall TECHNOLOGIST PROVIDED HISTORY: Reason for exam:->fall What reading provider will be dictating this exam?->CRC FINDINGS: No fracture or dislocation. Left total knee arthroplasty and the prosthetic components appear in good position. No prosthetic loosening identified. Possible small suprapatellar effusion. Atherosclerotic calcifications are present. Separate from the arthroplasty, no radiopaque foreign body. 1.  No fracture or acute osseous abnormality. 2.  Left total knee arthroplasty with prosthetic components in good position. Possible small effusion. XR ANKLE LEFT (MIN 3 VIEWS)    Result Date: 2/20/2023  EXAMINATION: THREE XRAY VIEWS OF THE LEFT ANKLE 2/20/2023 12:14 pm COMPARISON: None available HISTORY: ORDERING SYSTEM PROVIDED HISTORY: fall TECHNOLOGIST PROVIDED HISTORY: Reason for exam:->fall What reading provider will be dictating this exam?->CRC FINDINGS: Medial and lateral malleoli are intact. Ankle mortise is congruent. Talar dome is intact. Subtalar joint is unremarkable, with maintenance of the anterior process. Lateral and anterior soft tissue swelling noted. Moderate tibiotalar arthrosis noted. Subtalar joint is unremarkable. Lateral and anterior soft tissue swelling. No acute bony abnormality. CT HEAD WO CONTRAST    Result Date: 2/18/2023  EXAMINATION: CT OF THE HEAD WITHOUT CONTRAST  2/18/2023 5:56 pm TECHNIQUE: CT of the head was performed without the administration of intravenous contrast. Automated exposure control, iterative reconstruction, and/or weight based adjustment of the mA/kV was utilized to reduce the radiation dose to as low as reasonably achievable. COMPARISON: CT head, 10/07/2014 HISTORY: ORDERING SYSTEM PROVIDED HISTORY: fall TECHNOLOGIST PROVIDED HISTORY: Has a \"code stroke\" or \"stroke alert\" been called? ->No Reason for exam:->fall Decision Support Exception - unselect if not a suspected or confirmed emergency medical condition->Emergency Medical Condition (MA) What reading provider will be dictating this exam?->CRC FINDINGS: BRAIN/VENTRICLES: There is cerebral and cerebellar atrophy with associated ex vacuo dilatation of the ventricular system. There is mild ill-defined low-attenuation in the periventricular white matter, corona radiata, and centrum semiovale bilaterally which has association with age related microvascular white matter ischemic disease. There is no acute intracranial hemorrhage, mass effect or midline shift. No abnormal extra-axial fluid collection. The gray-white differentiation is maintained without evidence of an acute infarct. There is no evidence of hydrocephalus. ORBITS: The visualized portion of the orbits demonstrate no acute abnormality. SINUSES: Small mucous retention cyst in the visualized left maxillary sinus. The visualized mastoid air cells demonstrate no acute abnormality. SOFT TISSUES/SKULL:  No acute abnormality of the visualized skull or soft tissues. No acute intracranial abnormality. Chronic parenchymal change including atrophy and old white matter ischemia. CT CERVICAL SPINE WO CONTRAST    Result Date: 2/18/2023  EXAMINATION: CT OF THE CERVICAL SPINE WITHOUT CONTRAST 2/18/2023 5:56 pm TECHNIQUE: CT of the cervical spine was performed without the administration of intravenous contrast. Multiplanar reformatted images are provided for review. Automated exposure control, iterative reconstruction, and/or weight based adjustment of the mA/kV was utilized to reduce the radiation dose to as low as reasonably achievable. COMPARISON: None.  HISTORY: ORDERING SYSTEM PROVIDED HISTORY: fall TECHNOLOGIST PROVIDED HISTORY: Reason for exam:->fall Decision Support Exception - unselect if not a suspected or confirmed emergency medical condition->Emergency Medical Condition (MA) What reading provider will be dictating this exam?->CRC FINDINGS: BONES/ALIGNMENT: There is no acute fracture or traumatic malalignment.  The bones are osteopenic.  The cervical alignment is straightened with minimal C4 retrolisthesis. DEGENERATIVE CHANGES: The atlantodental articulation is intact with associated degenerative change.  There is cervical spondylosis, bilateral facet arthropathy, disc height loss at all cervical levels with severity at C4-5, C5-6, C6-7. SOFT TISSUES: There is no prevertebral soft tissue swelling.     No acute abnormality of the cervical spine. Multilevel bony and discogenic degenerative changes.     XR CHEST PORTABLE    Result Date: 2/18/2023  EXAMINATION: ONE XRAY VIEW OF THE CHEST 2/18/2023 7:12 pm COMPARISON: 11/03/2015 HISTORY: ORDERING SYSTEM PROVIDED HISTORY: altered mental status TECHNOLOGIST PROVIDED HISTORY: Reason for exam:->altered mental status What reading provider will be dictating this exam?->CRC FINDINGS: There is cardiomegaly with prominence of the right paratracheal soft tissues. There is atelectasis in the lung bases with mild haziness in the right midlung field.     Mild CHF.  Developing right perihilar pneumonia is considered.     MRI BRAIN WO CONTRAST    Result Date: 2/19/2023  EXAMINATION: MRI OF THE BRAIN WITHOUT CONTRAST  2/19/2023 12:14 pm TECHNIQUE: Multiplanar multisequence MRI of the brain was performed without the administration of intravenous contrast. COMPARISON: None. HISTORY: ORDERING SYSTEM PROVIDED HISTORY: Altered Mental Status, Stroke-like symptoms. TECHNOLOGIST PROVIDED HISTORY: Reason for exam:->Altered Mental Status, Stroke-like symptoms. What reading provider will be dictating this exam?->CRC FINDINGS: INTRACRANIAL STRUCTURES/VENTRICLES: There is no acute infarct. No mass effect or midline shift. No evidence of an acute intracranial hemorrhage.  There is generalized volume loss with mild chronic white matter microvascular ischemic change.  The  sellar/suprasellar regions appear unremarkable. The normal signal voids within the major intracranial vessels appear maintained. ORBITS: The visualized portion of the orbits demonstrate no acute abnormality. SINUSES: The visualized paranasal sinuses and mastoid air cells demonstrate no acute abnormality. BONES/SOFT TISSUES: The bone marrow signal intensity appears normal. The soft tissues demonstrate no acute abnormality. No acute infarct. RECOMMENDATIONS: Unavailable     US CAROTID ARTERY BILATERAL    Result Date: 2/19/2023  EXAMINATION: ULTRASOUND EVALUATION OF THE CAROTID ARTERIES 2/19/2023 TECHNIQUE: Duplex ultrasound using B-mode/gray scaled imaging, Doppler spectral analysis and color flow Doppler was obtained of the carotid arteries. COMPARISON: None. HISTORY: ORDERING SYSTEM PROVIDED HISTORY: Stroke-Like Symptoms TECHNOLOGIST PROVIDED HISTORY: Reason for exam:->Stroke-Like Symptoms What reading provider will be dictating this exam?->CRC FINDINGS: RIGHT: The right common carotid artery demonstrates peak systolic velocities of 482 and 89 cm/sec in the proximal and distal segments respectively. The right internal carotid artery demonstrates the systolic velocities of 72, 88 and 88 cm/sec in the proximal, mid and distal segments respectively. The external carotid artery is patent. The vertebral artery demonstrates normal antegrade flow. Mild scattered atherosclerotic plaque is noted. ICA/CCA ratio of 1.0 LEFT: The left common carotid artery demonstrates peak systolic velocities of 93 and 106 cm/sec in the proximal and distal segments respectively. The left internal carotid artery demonstrates the systolic velocities of 71, 65 and 65 cm/sec in the proximal, mid and distal segments respectively. The external carotid artery is patent. The vertebral artery demonstrates normal antegrade flow. Mild scattered atherosclerotic plaque is noted.  ICA/CCA ratio of 0.7     The right internal carotid artery demonstrates 0-50% stenosis. The left internal carotid artery demonstrates 0-50% stenosis. Bilateral vertebral arteries are patent with flow in the normal direction.  RECOMMENDATIONS: Unavailable       Discharge Medications:      Medication List        START taking these medications      guaiFENesin 100 MG/5ML liquid  Commonly known as: ROBITUSSIN  Take 10 mLs by mouth every 6 hours as needed for Cough or Congestion     levoFLOXacin 500 MG tablet  Commonly known as: LEVAQUIN  Take 1 tablet by mouth daily for 10 days     tamsulosin 0.4 MG capsule  Commonly known as: FLOMAX  Take 1 capsule by mouth daily            CONTINUE taking these medications      allopurinol 100 MG tablet  Commonly known as: ZYLOPRIM     amLODIPine 5 MG tablet  Commonly known as: NORVASC  Take 1 tablet by mouth daily     apixaban 5 MG Tabs tablet  Commonly known as: Eliquis  Take 1 tablet by mouth 2 times daily     Centrum Ultra Mens Tabs     glimepiride 4 MG tablet  Commonly known as: AMARYL  Take 1 tablet by mouth every morning (before breakfast)     metFORMIN 1000 MG tablet  Commonly known as: GLUCOPHAGE     omeprazole 20 MG delayed release capsule  Commonly known as: PRILOSEC            STOP taking these medications      lisinopril 20 MG tablet  Commonly known as: PRINIVIL;ZESTRIL               Where to Get Your Medications        These medications were sent to [address] [phone]  [place], [address] [place] [address] Road      Phone: [phone]   amLODIPine 5 MG tablet  glimepiride 4 MG tablet  guaiFENesin 100 MG/5ML liquid  levoFLOXacin 500 MG tablet  tamsulosin 0.4 MG capsule         Time Spent on discharge is 30  minutes in the review of medications and discharge plan      +++++++++++++++++++++++++++++++++++++++++++++++++  Owen Lopez, DO  [address] [facility], [address] [facility]  +++++++++++++++++++++++++++++++++++++++++++++++++  NOTE: This report was transcribed using voice recognition software. Every effort was made to ensure accuracy; however, inadvertent computerized transcription errors may be present.

## 2023-03-20 PROBLEM — N39.0 UTI (URINARY TRACT INFECTION): Status: RESOLVED | Noted: 2023-02-18 | Resolved: 2023-03-20

## 2023-04-14 ENCOUNTER — TELEPHONE (OUTPATIENT)
Dept: NON INVASIVE DIAGNOSTICS | Age: 78
End: 2023-04-14

## 2023-04-28 ENCOUNTER — TELEPHONE (OUTPATIENT)
Dept: NON INVASIVE DIAGNOSTICS | Age: 78
End: 2023-04-28

## 2023-04-28 NOTE — TELEPHONE ENCOUNTER
Left message for patient to call office to schedule device implant (05/18/2023 @ 11:30 AM).      Electronically signed by Zaid Mabry MA on 4/28/2023 at 1:45 PM

## 2023-05-16 ENCOUNTER — HOSPITAL ENCOUNTER (OUTPATIENT)
Dept: CARDIOLOGY | Age: 78
Discharge: HOME | End: 2023-05-16
Payer: MEDICARE

## 2023-05-16 DIAGNOSIS — I44.1 AV BLOCK, 2ND DEGREE: ICD-10-CM

## 2023-05-16 LAB
LV EF: 60 %
LVEF MODALITY: NORMAL

## 2023-05-16 PROCEDURE — 93306 TTE W/DOPPLER COMPLETE: CPT

## 2023-05-30 ENCOUNTER — TELEPHONE (OUTPATIENT)
Dept: CARDIAC CATH/INVASIVE PROCEDURES | Age: 78
End: 2023-05-30

## 2023-05-31 ENCOUNTER — ANESTHESIA (OUTPATIENT)
Dept: CARDIAC CATH/INVASIVE PROCEDURES | Age: 78
End: 2023-05-31

## 2023-05-31 ENCOUNTER — HOSPITAL ENCOUNTER (OUTPATIENT)
Dept: GENERAL RADIOLOGY | Age: 78
Discharge: HOME | DRG: 244 | End: 2023-06-02
Attending: STUDENT IN AN ORGANIZED HEALTH CARE EDUCATION/TRAINING PROGRAM
Payer: MEDICARE

## 2023-05-31 ENCOUNTER — HOSPITAL ENCOUNTER (INPATIENT)
Dept: CARDIAC CATH/INVASIVE PROCEDURES | Age: 78
LOS: 1 days | Discharge: HOME | DRG: 244 | End: 2023-06-01
Attending: STUDENT IN AN ORGANIZED HEALTH CARE EDUCATION/TRAINING PROGRAM | Admitting: STUDENT IN AN ORGANIZED HEALTH CARE EDUCATION/TRAINING PROGRAM
Payer: MEDICARE

## 2023-05-31 ENCOUNTER — ANESTHESIA EVENT (OUTPATIENT)
Dept: CARDIAC CATH/INVASIVE PROCEDURES | Age: 78
End: 2023-05-31

## 2023-05-31 DIAGNOSIS — Z95.0 PACEMAKER: Primary | ICD-10-CM

## 2023-05-31 DIAGNOSIS — I44.1 AV BLOCK, 2ND DEGREE: ICD-10-CM

## 2023-05-31 LAB
BASOPHILS # BLD: 0.03 E9/L (ref 0–0.2)
BASOPHILS NFR BLD: 0.6 % (ref 0–2)
EOSINOPHIL # BLD: 0.16 E9/L (ref 0.05–0.5)
EOSINOPHIL NFR BLD: 3.3 % (ref 0–6)
ERYTHROCYTE [DISTWIDTH] IN BLOOD BY AUTOMATED COUNT: 13.6 FL (ref 11.5–15)
HCT VFR BLD AUTO: 42.4 % (ref 37–54)
HGB BLD-MCNC: 14.2 G/DL (ref 12.5–16.5)
IMM GRANULOCYTES # BLD: 0.01 E9/L
IMM GRANULOCYTES NFR BLD: 0.2 % (ref 0–5)
LYMPHOCYTES # BLD: 1.56 E9/L (ref 1.5–4)
LYMPHOCYTES NFR BLD: 32.4 % (ref 20–42)
MCH RBC QN AUTO: 33.9 PG (ref 26–35)
MCHC RBC AUTO-ENTMCNC: 33.5 % (ref 32–34.5)
MCV RBC AUTO: 101.2 FL (ref 80–99.9)
METER GLUCOSE: 189 MG/DL (ref 74–99)
MONOCYTES # BLD: 0.56 E9/L (ref 0.1–0.95)
MONOCYTES NFR BLD: 11.6 % (ref 2–12)
NEUTROPHILS # BLD: 2.5 E9/L (ref 1.8–7.3)
NEUTS SEG NFR BLD: 51.9 % (ref 43–80)
PLATELET # BLD AUTO: 144 E9/L (ref 130–450)
PMV BLD AUTO: 11.1 FL (ref 7–12)
RBC # BLD AUTO: 4.19 E12/L (ref 3.8–5.8)
WBC # BLD: 4.8 E9/L (ref 4.5–11.5)

## 2023-05-31 PROCEDURE — 2709999900 HC NON-CHARGEABLE SUPPLY

## 2023-05-31 PROCEDURE — 2500000003 HC RX 250 WO HCPCS

## 2023-05-31 PROCEDURE — 0JH606Z INSERTION OF PACEMAKER, DUAL CHAMBER INTO CHEST SUBCUTANEOUS TISSUE AND FASCIA, OPEN APPROACH: ICD-10-PCS | Performed by: STUDENT IN AN ORGANIZED HEALTH CARE EDUCATION/TRAINING PROGRAM

## 2023-05-31 PROCEDURE — 71045 X-RAY EXAM CHEST 1 VIEW: CPT

## 2023-05-31 PROCEDURE — C1898 LEAD, PMKR, OTHER THAN TRANS: HCPCS

## 2023-05-31 PROCEDURE — 82962 GLUCOSE BLOOD TEST: CPT

## 2023-05-31 PROCEDURE — 2580000003 HC RX 258: Performed by: STUDENT IN AN ORGANIZED HEALTH CARE EDUCATION/TRAINING PROGRAM

## 2023-05-31 PROCEDURE — 36415 COLL VENOUS BLD VENIPUNCTURE: CPT

## 2023-05-31 PROCEDURE — 93005 ELECTROCARDIOGRAM TRACING: CPT | Performed by: STUDENT IN AN ORGANIZED HEALTH CARE EDUCATION/TRAINING PROGRAM

## 2023-05-31 PROCEDURE — G0378 HOSPITAL OBSERVATION PER HR: HCPCS

## 2023-05-31 PROCEDURE — C1785 PMKR, DUAL, RATE-RESP: HCPCS

## 2023-05-31 PROCEDURE — 6370000000 HC RX 637 (ALT 250 FOR IP): Performed by: STUDENT IN AN ORGANIZED HEALTH CARE EDUCATION/TRAINING PROGRAM

## 2023-05-31 PROCEDURE — 85025 COMPLETE CBC W/AUTO DIFF WBC: CPT

## 2023-05-31 PROCEDURE — 33208 INSRT HEART PM ATRIAL & VENT: CPT | Performed by: STUDENT IN AN ORGANIZED HEALTH CARE EDUCATION/TRAINING PROGRAM

## 2023-05-31 PROCEDURE — 33208 INSRT HEART PM ATRIAL & VENT: CPT

## 2023-05-31 PROCEDURE — 2580000003 HC RX 258

## 2023-05-31 PROCEDURE — 6360000002 HC RX W HCPCS

## 2023-05-31 PROCEDURE — 02HK3JZ INSERTION OF PACEMAKER LEAD INTO RIGHT VENTRICLE, PERCUTANEOUS APPROACH: ICD-10-PCS | Performed by: STUDENT IN AN ORGANIZED HEALTH CARE EDUCATION/TRAINING PROGRAM

## 2023-05-31 PROCEDURE — C1894 INTRO/SHEATH, NON-LASER: HCPCS

## 2023-05-31 PROCEDURE — 02H63JZ INSERTION OF PACEMAKER LEAD INTO RIGHT ATRIUM, PERCUTANEOUS APPROACH: ICD-10-PCS | Performed by: STUDENT IN AN ORGANIZED HEALTH CARE EDUCATION/TRAINING PROGRAM

## 2023-05-31 RX ORDER — SODIUM CHLORIDE 0.9 % (FLUSH) 0.9 %
5-40 SYRINGE (ML) INJECTION EVERY 12 HOURS SCHEDULED
Status: DISCONTINUED | OUTPATIENT
Start: 2023-05-31 | End: 2023-06-01 | Stop reason: HOSPADM

## 2023-05-31 RX ORDER — INSULIN LISPRO 100 [IU]/ML
0-4 INJECTION, SOLUTION INTRAVENOUS; SUBCUTANEOUS
Status: DISCONTINUED | OUTPATIENT
Start: 2023-06-01 | End: 2023-06-01 | Stop reason: HOSPADM

## 2023-05-31 RX ORDER — TAMSULOSIN HYDROCHLORIDE 0.4 MG/1
0.4 CAPSULE ORAL DAILY
Status: DISCONTINUED | OUTPATIENT
Start: 2023-06-01 | End: 2023-06-01 | Stop reason: HOSPADM

## 2023-05-31 RX ORDER — DEXTROSE MONOHYDRATE 100 MG/ML
INJECTION, SOLUTION INTRAVENOUS CONTINUOUS PRN
Status: DISCONTINUED | OUTPATIENT
Start: 2023-05-31 | End: 2023-06-01 | Stop reason: HOSPADM

## 2023-05-31 RX ORDER — ALLOPURINOL 100 MG/1
100 TABLET ORAL DAILY
Status: DISCONTINUED | OUTPATIENT
Start: 2023-06-01 | End: 2023-06-01 | Stop reason: HOSPADM

## 2023-05-31 RX ORDER — SODIUM CHLORIDE 9 MG/ML
INJECTION, SOLUTION INTRAVENOUS CONTINUOUS
Status: DISCONTINUED | OUTPATIENT
Start: 2023-05-31 | End: 2023-06-01

## 2023-05-31 RX ORDER — SODIUM CHLORIDE 9 MG/ML
INJECTION, SOLUTION INTRAVENOUS PRN
Status: DISCONTINUED | OUTPATIENT
Start: 2023-05-31 | End: 2023-06-01 | Stop reason: HOSPADM

## 2023-05-31 RX ORDER — PANTOPRAZOLE SODIUM 40 MG/1
40 TABLET, DELAYED RELEASE ORAL
Status: DISCONTINUED | OUTPATIENT
Start: 2023-06-01 | End: 2023-06-01 | Stop reason: HOSPADM

## 2023-05-31 RX ORDER — SODIUM CHLORIDE 0.9 % (FLUSH) 0.9 %
5-40 SYRINGE (ML) INJECTION PRN
Status: DISCONTINUED | OUTPATIENT
Start: 2023-05-31 | End: 2023-06-01 | Stop reason: HOSPADM

## 2023-05-31 RX ORDER — ACETAMINOPHEN 325 MG/1
650 TABLET ORAL EVERY 4 HOURS PRN
Status: DISCONTINUED | OUTPATIENT
Start: 2023-05-31 | End: 2023-06-01 | Stop reason: HOSPADM

## 2023-05-31 RX ORDER — MULTIVITAMIN WITH IRON
1 TABLET ORAL DAILY
Status: DISCONTINUED | OUTPATIENT
Start: 2023-06-01 | End: 2023-06-01 | Stop reason: HOSPADM

## 2023-05-31 RX ORDER — AMLODIPINE BESYLATE 5 MG/1
5 TABLET ORAL DAILY
Status: DISCONTINUED | OUTPATIENT
Start: 2023-06-01 | End: 2023-06-01 | Stop reason: HOSPADM

## 2023-05-31 RX ORDER — INSULIN LISPRO 100 [IU]/ML
0-4 INJECTION, SOLUTION INTRAVENOUS; SUBCUTANEOUS NIGHTLY
Status: DISCONTINUED | OUTPATIENT
Start: 2023-05-31 | End: 2023-06-01 | Stop reason: HOSPADM

## 2023-05-31 RX ADMIN — SODIUM CHLORIDE, PRESERVATIVE FREE 10 ML: 5 INJECTION INTRAVENOUS at 21:44

## 2023-05-31 RX ADMIN — ACETAMINOPHEN 650 MG: 325 TABLET ORAL at 21:40

## 2023-05-31 RX ADMIN — SODIUM CHLORIDE: 9 INJECTION, SOLUTION INTRAVENOUS at 21:46

## 2023-05-31 ASSESSMENT — PAIN DESCRIPTION - DESCRIPTORS
DESCRIPTORS: ACHING;DISCOMFORT;SORE
DESCRIPTORS: ACHING;DISCOMFORT;SORE;DULL

## 2023-05-31 ASSESSMENT — PAIN DESCRIPTION - ORIENTATION
ORIENTATION: LEFT
ORIENTATION: LEFT

## 2023-05-31 ASSESSMENT — PAIN SCALES - GENERAL
PAINLEVEL_OUTOF10: 8
PAINLEVEL_OUTOF10: 7

## 2023-05-31 ASSESSMENT — PAIN DESCRIPTION - LOCATION
LOCATION: CHEST
LOCATION: SHOULDER;CHEST

## 2023-05-31 NOTE — ANESTHESIA PRE PROCEDURE
Department of Anesthesiology  Preprocedure Note       Name:  Dilcia Becker   Age:  76 y.o.  :  1945                                          MRN:  [id number]         Date:  2023      Surgeon: Leonides Zabala    Procedure: VENOGRAM, IMPLANT PPM    Medications prior to admission:   Prior to Admission medications    Medication Sig Start Date End Date Taking? Authorizing Provider   NONFORMULARY Uric Acid Flush    Historical Provider, MD   amLODIPine (NORVASC) 5 MG tablet Take 1 tablet by mouth daily 23   Sabino Villasenor DO   glimepiride (AMARYL) 4 MG tablet Take 1 tablet by mouth every morning (before breakfast) 23   Owen Peralta,    tamsulosin [facility]) 0.4 MG capsule Take 1 capsule by mouth daily 23   CLOTILDE Wesley - CNP   allopurinol (ZYLOPRIM) 100 MG tablet allopurinol 100 mg tablet   Take 2 tablets every day by oral route. Historical Provider, MD   apixaban (ELIQUIS) 5 MG TABS tablet Take 1 tablet by mouth 2 times daily 19   Marquita Darling MD   Multiple Vitamins-Minerals (CENTRUM ULTRA MENS) TABS Take 1 tablet by mouth daily     Historical Provider, MD   omeprazole (PRILOSEC) 20 MG capsule Take 1 capsule by mouth daily    Historical Provider, MD   metFORMIN (GLUCOPHAGE) 1000 MG tablet Take 1 tablet by mouth 2 times daily (with meals)    Historical Provider, MD       Current medications:    Current Outpatient Medications   Medication Sig Dispense Refill    NONFORMULARY Uric Acid Flush      amLODIPine (NORVASC) 5 MG tablet Take 1 tablet by mouth daily 90 tablet 3    glimepiride (AMARYL) 4 MG tablet Take 1 tablet by mouth every morning (before breakfast) 30 tablet 3    tamsulosin (FLOMAX) 0.4 MG capsule Take 1 capsule by mouth daily 30 capsule 0    allopurinol (ZYLOPRIM) 100 MG tablet allopurinol 100 mg tablet   Take 2 tablets every day by oral route.       apixaban (ELIQUIS) 5 MG TABS tablet Take 1 tablet by mouth 2 times daily 60 tablet 0    Multiple

## 2023-05-31 NOTE — DISCHARGE INSTRUCTIONS
[facility] Pacemaker Instructions    Medications: no medication changes. Resume all prescribed medications, except apixaban (Eliquis). RESTART apixaban (Eliquis) on 6/2/23. Incision Care:  Ty Leesa Aquacel dressing: located over your incision. Remove in 1 week Wednesday, 6/7/23. Surgical glue: located under the brown dressings and over your incision. This glue is there to prevent infection. Do NOT scrub, itch, pick, or remove the glue as this could lead to infection. The glue will fall off on its own over the next 6 weeks. Daily monitoring: check incision sites on chest daily. Notify the office at [phone] if you develop any redness, swelling, drainage, warmth, or fever greater than 100 degrees. Bathing:  No soaking in a bathtub, hot tub, or pool for 6 weeks. You may shower, but do not scrub at the incision site as it has surgical glue covering it, which is there to prevent infection. Activity:  You may resume normal activity with left arm restrictions. Arm restrictions:  Arm immobilizer: Wear the arm immobilizer at all times for 48 hours except to shower, toilet, and eat. After 48 hours, wear the arm immobilizer at night for the next 4 weeks. After 4 weeks you do not need to wear the arm immobilizer anymore. The immobilizer should be loose, not tight in order to avoid restriction of blood flow. Avoid pulling yourself up with your arm, pushing or stretching motions. Do not raise left elbow higher than shoulder height and do not lift anything weighing more than 3 pounds for 6 weeks. Do not do any activities such as golfing, vacuuming, or mowing the lawn for 6 weeks. Prevent any hard blows to the pacemaker. Driving: You may drive, if you feel up to it, in 14 days, or after your follow up appointment. Start with local/short trips to familiar places. Avoid highway/ high-speed driving for the first few days after you resume driving.   DO NOT drive until you have stopped taking

## 2023-05-31 NOTE — H&P
[facility] CARDIOLOGY  CARDIAC ELECTROPHYSIOLOGY DEPARTMENT/DIVISION OF CARDIOLOGY  H&P Report  PATIENT: Dilcia Becker  MEDICAL RECORD NUMBER: [id number]  DATE OF SERVICE:  2023  ATTENDING ELECTROPHYSIOLOGIST:  Stanley Nelson D.O.  REFERRING PHYSICIAN: Krystle Ashby DO and Ashlie Ojeda MD  CHIEF COMPLAINT: PAF    HPI: Dilcia Becker is a 76 y.o. 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 He is managed by Dr. Martha Espinal with amlodipine 5 mg daily, apixaban 5 mg twice daily, and PPI. In , patient was diagnosed with nonvalvular paroxysmal AF, which was treated with [address]. Around this time, he was noted to have first-degree AV block (300 ms). In , patient was noted to have episodes of second-degree AV block. In , [place] interval had progressed to greater than 300 ms. In 2023, he was referred to my office. He complained of dyspnea on exertion as well as fatigue. I recommended BSCI dc PPM for 2* AV block and pseudo-pacemaker syndrome. He presents today, 23; for outpatient BSCI dc PPM implant. He denies any other complaints at this time. His last dose of apixaban was yesterday evening. Prior cardiac testing:   TTE (23): LVEF = 60%, severe concentric LVH, moderate AS, mild TR.  EC23: Sinus at 75 bpm, first-degree AV block (440 ms)  ECG 2021: normal sinus rhythm, LAFB, rate: 87 bpm   24 hour HM: 2020: has 2:1 av block mostly at night. He is asymptomatic. I discussed pacemaker but he prefers to monitor for now as he is asymptomatic. Monitor is not that much different from last one 2 yrs ago. Cont to monitor   TTE: 2018: LVEF: 65%, No significant valvular abnormalities.   Exercise Stress: 3/2018: Exercise EKG was negative, Low risk general exercise treadmill test.    Past Medical History:   Diagnosis Date

## 2023-06-01 ENCOUNTER — APPOINTMENT (OUTPATIENT)
Dept: GENERAL RADIOLOGY | Age: 78
DRG: 244 | End: 2023-06-01
Attending: STUDENT IN AN ORGANIZED HEALTH CARE EDUCATION/TRAINING PROGRAM
Payer: MEDICARE

## 2023-06-01 VITALS
SYSTOLIC BLOOD PRESSURE: 139 MMHG | TEMPERATURE: 97.2 F | WEIGHT: 228 LBS | OXYGEN SATURATION: 96 % | BODY MASS INDEX: 30.88 KG/M2 | RESPIRATION RATE: 18 BRPM | HEIGHT: 72 IN | HEART RATE: 80 BPM | DIASTOLIC BLOOD PRESSURE: 70 MMHG

## 2023-06-01 PROBLEM — I97.638: Status: ACTIVE | Noted: 2023-06-01

## 2023-06-01 PROBLEM — J81.0 ACUTE PULMONARY EDEMA (HCC): Status: ACTIVE | Noted: 2023-06-01

## 2023-06-01 LAB
ANION GAP SERPL CALCULATED.3IONS-SCNC: 15 MMOL/L (ref 7–16)
BUN SERPL-MCNC: 16 MG/DL (ref 6–23)
CALCIUM SERPL-MCNC: 8.8 MG/DL (ref 8.6–10.2)
CHLORIDE SERPL-SCNC: 104 MMOL/L (ref 98–107)
CO2 SERPL-SCNC: 21 MMOL/L (ref 22–29)
CREAT SERPL-MCNC: 1.2 MG/DL (ref 0.7–1.2)
EKG ATRIAL RATE: 75 BPM
EKG P AXIS: 39 DEGREES
EKG P-R INTERVAL: 204 MS
EKG Q-T INTERVAL: 432 MS
EKG QRS DURATION: 136 MS
EKG QTC CALCULATION (BAZETT): 482 MS
EKG R AXIS: 53 DEGREES
EKG T AXIS: -136 DEGREES
EKG VENTRICULAR RATE: 75 BPM
ERYTHROCYTE [DISTWIDTH] IN BLOOD BY AUTOMATED COUNT: 13.6 FL (ref 11.5–15)
GLUCOSE SERPL-MCNC: 171 MG/DL (ref 74–99)
HCT VFR BLD AUTO: 35.2 % (ref 37–54)
HGB BLD-MCNC: 11.9 G/DL (ref 12.5–16.5)
MCH RBC QN AUTO: 33.9 PG (ref 26–35)
MCHC RBC AUTO-ENTMCNC: 33.8 % (ref 32–34.5)
MCV RBC AUTO: 100.3 FL (ref 80–99.9)
METER GLUCOSE: 178 MG/DL (ref 74–99)
METER GLUCOSE: 197 MG/DL (ref 74–99)
METER GLUCOSE: 224 MG/DL (ref 74–99)
PLATELET # BLD AUTO: 134 E9/L (ref 130–450)
PMV BLD AUTO: 11.7 FL (ref 7–12)
POTASSIUM SERPL-SCNC: 4 MMOL/L (ref 3.5–5)
RBC # BLD AUTO: 3.51 E12/L (ref 3.8–5.8)
SODIUM SERPL-SCNC: 140 MMOL/L (ref 132–146)
WBC # BLD: 5.7 E9/L (ref 4.5–11.5)

## 2023-06-01 PROCEDURE — 93010 ELECTROCARDIOGRAM REPORT: CPT | Performed by: INTERNAL MEDICINE

## 2023-06-01 PROCEDURE — 36415 COLL VENOUS BLD VENIPUNCTURE: CPT

## 2023-06-01 PROCEDURE — 2060000000 HC ICU INTERMEDIATE R&B

## 2023-06-01 PROCEDURE — G0378 HOSPITAL OBSERVATION PER HR: HCPCS

## 2023-06-01 PROCEDURE — 6370000000 HC RX 637 (ALT 250 FOR IP): Performed by: STUDENT IN AN ORGANIZED HEALTH CARE EDUCATION/TRAINING PROGRAM

## 2023-06-01 PROCEDURE — 82962 GLUCOSE BLOOD TEST: CPT

## 2023-06-01 PROCEDURE — 6360000002 HC RX W HCPCS: Performed by: INTERNAL MEDICINE

## 2023-06-01 PROCEDURE — 85027 COMPLETE CBC AUTOMATED: CPT

## 2023-06-01 PROCEDURE — 2580000003 HC RX 258: Performed by: STUDENT IN AN ORGANIZED HEALTH CARE EDUCATION/TRAINING PROGRAM

## 2023-06-01 PROCEDURE — 6370000000 HC RX 637 (ALT 250 FOR IP): Performed by: INTERNAL MEDICINE

## 2023-06-01 PROCEDURE — 71045 X-RAY EXAM CHEST 1 VIEW: CPT

## 2023-06-01 PROCEDURE — 6370000000 HC RX 637 (ALT 250 FOR IP): Performed by: NURSE PRACTITIONER

## 2023-06-01 PROCEDURE — 71046 X-RAY EXAM CHEST 2 VIEWS: CPT

## 2023-06-01 PROCEDURE — 80048 BASIC METABOLIC PNL TOTAL CA: CPT

## 2023-06-01 PROCEDURE — 99239 HOSP IP/OBS DSCHRG MGMT >30: CPT | Performed by: INTERNAL MEDICINE

## 2023-06-01 RX ORDER — DOXYCYCLINE HYCLATE 100 MG/1
100 CAPSULE ORAL EVERY 12 HOURS SCHEDULED
Status: DISCONTINUED | OUTPATIENT
Start: 2023-06-01 | End: 2023-06-01 | Stop reason: HOSPADM

## 2023-06-01 RX ORDER — HYDROCODONE BITARTRATE AND ACETAMINOPHEN 5; 325 MG/1; MG/1
1 TABLET ORAL EVERY 6 HOURS PRN
Qty: 5 TABLET | Refills: 0 | Status: SHIPPED | OUTPATIENT
Start: 2023-06-01 | End: 2023-06-01 | Stop reason: SDUPTHER

## 2023-06-01 RX ORDER — HYDROCODONE BITARTRATE AND ACETAMINOPHEN 5; 325 MG/1; MG/1
1 TABLET ORAL EVERY 6 HOURS PRN
Qty: 5 TABLET | Refills: 0 | Status: SHIPPED | OUTPATIENT
Start: 2023-06-01 | End: 2023-06-03

## 2023-06-01 RX ORDER — HYDROCODONE BITARTRATE AND ACETAMINOPHEN 5; 325 MG/1; MG/1
1 TABLET ORAL EVERY 6 HOURS PRN
Status: DISCONTINUED | OUTPATIENT
Start: 2023-06-01 | End: 2023-06-01 | Stop reason: HOSPADM

## 2023-06-01 RX ORDER — HYDROCODONE BITARTRATE AND ACETAMINOPHEN 5; 325 MG/1; MG/1
1 TABLET ORAL ONCE
Status: COMPLETED | OUTPATIENT
Start: 2023-06-01 | End: 2023-06-01

## 2023-06-01 RX ORDER — FUROSEMIDE 10 MG/ML
20 INJECTION INTRAMUSCULAR; INTRAVENOUS ONCE
Status: COMPLETED | OUTPATIENT
Start: 2023-06-01 | End: 2023-06-01

## 2023-06-01 RX ORDER — DOXYCYCLINE HYCLATE 100 MG/1
100 CAPSULE ORAL EVERY 12 HOURS SCHEDULED
Qty: 14 CAPSULE | Refills: 0 | Status: SHIPPED | OUTPATIENT
Start: 2023-06-01 | End: 2023-06-08

## 2023-06-01 RX ADMIN — ACETAMINOPHEN 650 MG: 325 TABLET ORAL at 01:49

## 2023-06-01 RX ADMIN — ACETAMINOPHEN 650 MG: 325 TABLET ORAL at 06:43

## 2023-06-01 RX ADMIN — PANTOPRAZOLE SODIUM 40 MG: 40 TABLET, DELAYED RELEASE ORAL at 06:43

## 2023-06-01 RX ADMIN — AMLODIPINE BESYLATE 5 MG: 5 TABLET ORAL at 08:29

## 2023-06-01 RX ADMIN — ALLOPURINOL 100 MG: 100 TABLET ORAL at 08:29

## 2023-06-01 RX ADMIN — FUROSEMIDE 20 MG: 10 INJECTION, SOLUTION INTRAMUSCULAR; INTRAVENOUS at 11:12

## 2023-06-01 RX ADMIN — SODIUM CHLORIDE: 9 INJECTION, SOLUTION INTRAVENOUS at 06:53

## 2023-06-01 RX ADMIN — HYDROCODONE BITARTRATE AND ACETAMINOPHEN 1 TABLET: 5; 325 TABLET ORAL at 08:36

## 2023-06-01 RX ADMIN — INSULIN LISPRO 1 UNITS: 100 INJECTION, SOLUTION INTRAVENOUS; SUBCUTANEOUS at 12:13

## 2023-06-01 RX ADMIN — MULTIVITAMIN TABLET 1 TABLET: TABLET at 08:30

## 2023-06-01 RX ADMIN — DOXYCYCLINE HYCLATE 100 MG: 100 CAPSULE ORAL at 12:05

## 2023-06-01 RX ADMIN — HYDROCODONE BITARTRATE AND ACETAMINOPHEN 1 TABLET: 5; 325 TABLET ORAL at 15:47

## 2023-06-01 RX ADMIN — SODIUM CHLORIDE, PRESERVATIVE FREE 10 ML: 5 INJECTION INTRAVENOUS at 08:31

## 2023-06-01 RX ADMIN — TAMSULOSIN HYDROCHLORIDE 0.4 MG: 0.4 CAPSULE ORAL at 08:30

## 2023-06-01 ASSESSMENT — PAIN DESCRIPTION - DESCRIPTORS
DESCRIPTORS: ACHING;DISCOMFORT;SORE
DESCRIPTORS: ACHING;SHARP;TENDER;SORE
DESCRIPTORS: ACHING;THROBBING;TENDER
DESCRIPTORS: ACHING;DISCOMFORT;SORE

## 2023-06-01 ASSESSMENT — PAIN DESCRIPTION - ORIENTATION
ORIENTATION: LEFT

## 2023-06-01 ASSESSMENT — PAIN DESCRIPTION - LOCATION
LOCATION: SHOULDER;ARM
LOCATION: CHEST
LOCATION: CHEST

## 2023-06-01 ASSESSMENT — PAIN SCALES - GENERAL
PAINLEVEL_OUTOF10: 7
PAINLEVEL_OUTOF10: 8
PAINLEVEL_OUTOF10: 8

## 2023-06-01 ASSESSMENT — PAIN - FUNCTIONAL ASSESSMENT
PAIN_FUNCTIONAL_ASSESSMENT: PREVENTS OR INTERFERES SOME ACTIVE ACTIVITIES AND ADLS
PAIN_FUNCTIONAL_ASSESSMENT: PREVENTS OR INTERFERES SOME ACTIVE ACTIVITIES AND ADLS

## 2023-06-01 NOTE — PROGRESS NOTES
4 Eyes Skin Assessment     NAME:  Del Lawrence  YOB: 1945  MEDICAL RECORD NUMBER:  [id number]    The patient is being assessed for  Admission    I agree that at least one RN has performed a thorough Head to Toe Skin Assessment on the patient. ALL assessment sites listed below have been assessed. Areas assessed by both nurses:    Head, Face, Ears, Shoulders, Back, Chest, Arms, Elbows, Hands, Sacrum. Buttock, Coccyx, Ischium, Legs. Feet and Heels, and Under Medical Devices         Does the Patient have a Wound?  No noted wound(s)       Ronak Prevention initiated by RN: No  Wound Care Orders initiated by RN: No    Pressure Injury (Stage 3,4, Unstageable, DTI, NWPT, and Complex wounds) if present, place Wound referral order by RN under : No    New Ostomies, if present place, Ostomy referral order under : No     Nurse 1 eSignature: Electronically signed by Gisselle Dhillon RN on 6/1/23 at 6:59 AM EDT    **SHARE this note so that the co-signing nurse can place an eSignature**    Nurse 2 eSignature: Electronically signed by Puma Zeng RN on 6/1/23 at 6:59 AM EDT

## 2023-06-01 NOTE — CARE COORDINATION
Patient presented to hospital for dual chamber pacemaker insertion for 2nd degree AV block/pseudopacemaker syndrome Met with patient at bedside to discuss transition of care. Chest showed  New left-sided airspace disease  IV lasix x1. Met  with patient at bedside to discuss transition of care. Patient lives with his spouse in a 1 story home. He was independent PTA. His PCP is Dr. Vanessa Padilla and he uses [facility] [address] on [place]. His spouse will transport home when medically cleared.

## 2023-06-01 NOTE — ACP (ADVANCE CARE PLANNING)
Advance Care Planning   Healthcare Decision Maker:    Primary Decision Maker: Pratibha Ceballos - Spouse - [phone]    Secondary Decision Maker: Guanaco Byers - Child - [phone]    Above information confirmed per conversation with patient at bedside

## 2023-06-01 NOTE — PLAN OF CARE
Problem: Chronic Conditions and Co-morbidities  Goal: Patient's chronic conditions and co-morbidity symptoms are monitored and maintained or improved  6/1/2023 1850 by Jennetta Mcburney, RN  Outcome: Completed  6/1/2023 0842 by Jennetta Mcburney, RN  Outcome: Progressing     Problem: Discharge Planning  Goal: Discharge to home or other facility with appropriate resources  6/1/2023 1850 by Jennetta Mcburney, RN  Outcome: Completed  6/1/2023 0842 by Jennetta Mcburney, RN  Outcome: Progressing     Problem: Safety - Adult  Goal: Free from fall injury  6/1/2023 1850 by Jennetta Mcburney, RN  Outcome: Completed  6/1/2023 0842 by Jennetta Mcburney, RN  Outcome: Progressing     Problem: ABCDS Injury Assessment  Goal: Absence of physical injury  6/1/2023 1850 by Jennetta Mcburney, RN  Outcome: Completed  6/1/2023 0842 by Jennetta Mcburney, RN  Outcome: Progressing     Problem: Pain  Goal: Verbalizes/displays adequate comfort level or baseline comfort level  Outcome: Completed

## 2023-06-01 NOTE — DISCHARGE SUMMARY
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 In 2018, patient was diagnosed with nonvalvular paroxysmal AF, which was treated with [address]. Around this time, he was noted to have first-degree AV block (300 ms). In 2020, patient was noted to have episodes of second-degree AV block. In 2021, [place] interval had progressed to greater than 300 ms. He complained of dyspnea on exertion as well as fatigue. PPM for 2* AV block and pseudo-pacemaker syndrome was recommended. Hospital Course:  He presented,  5/31/23; for outpatient [facility] dc PPM implant. He underwent successful [employer] dual chamber pacemaker implant in the setting of 2* AV block and pseudo-pacemaker syndrome. Significant tortuosity of the venous system. Introduction of RA lead J-stylet was difficult due to tortuosity as was removal of the RA lead J stylet. RA lead stability was difficult due to venous tortuosity. Ultimately RA lead positioned 3 times, but stability in RA lead position and function was achieved. He stayed overnight and device check stable in the morning as above. Chest x-ray showed left side possible fluid accumulation. He was treated with 20 mg IV Lasix. Also left upper chest site with small hematoma and patient had increased pain. Pressure dressing applied and patient was treated with Norco.  He underwent repeat chest x-ray in the afternoon. Eliquis held. He was treated with doxycycline prophylactically. The patient was asked to resume Eliquis on Sunday, 6/3/2023. Repeat chest x-ray showed complete clearing of both lung fields. He will follow-up for his pacemaker check as scheduled. I did speak to Dr. Brett Armas, his implanting physician and informed him of all of the above events.     Procedures Performed: Dual-chamber pacemaker placed    Consultants: None    Disposition: The patient was discharged to home in stable condition on the above

## 2023-06-01 NOTE — OP NOTE
Operative Note      Patient: Annetta Medina  YOB: 1945  MRN: [id number]    Date of Procedure: 5/31/2023     Patient History: Annetta Medina is a 76 y.o. 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 He is managed by Dr. Mat Carrillo with amlodipine 5 mg daily, apixaban 5 mg twice daily, and PPI. In 2018, patient was diagnosed with nonvalvular paroxysmal AF, which was treated with [address]. Around this time, he was noted to have first-degree AV block (300 ms). In 2020, patient was noted to have episodes of second-degree AV block. In 2021, [place] interval had progressed to greater than 300 ms. In 4/2023, he was referred to my office. He complained of dyspnea on exertion as well as fatigue. I recommended BSCI dc PPM for 2* AV block and pseudo-pacemaker syndrome. He presents today, 5/31/23; for outpatient BSCI dc PPM implant. He denies any other complaints at this time. His last dose of apixaban was yesterday evening. Pre-Op Diagnosis: 2* AV block, pseudo-pacemaker syndrome    Post-Op Diagnosis: Same       Procedure: [address] dual chamber pacemaker implant (CPT code: 13013)     Surgeon: Carmen Donahue DO     Assistant: None     Anesthesia: conscious sedation with versed and fentanyl     Estimated Blood Loss (mL): 10 mL     Complications: none     Detailed Description of Procedure:   Verbal and written informed consent obtained from the patient and family. The patient was brought to the EP lab in a fasting state. Conscious sedation with versed and fentanyl performed. A venogram with IV contrast confirmed patency and location of left axillary, subclavian veins and SVC. The venous system was noted to have significant tortuosity.  The left upper chest was inspected for hair at the anticipated incision site within the clavipectoral triangle and if present was

## 2023-06-01 NOTE — PATIENT CARE CONFERENCE
P Quality Flow/Interdisciplinary Rounds Progress Note        Quality Flow Rounds held on June 1, 2023    Disciplines Attending:  Bedside Nurse, , , and Nursing Unit Leadership    Joseph Portillo was admitted on 5/31/2023  8:12 PM    Anticipated Discharge Date:       Disposition:    Ronak Score:  Ronak Scale Score: 19    Readmission Risk              Risk of Unplanned Readmission:  0           Discussed patient goal for the day, patient clinical progression, and barriers to discharge. The following Goal(s) of the Day/Commitment(s) have been identified:  Labs - Report Results and assess site for any increase in swelling at pacer site.       Iraj King RN  June 1, 2023

## 2023-06-01 NOTE — NURSE NAVIGATOR
ARRHYTHMIA EDUCATION     Met with patient and his wife Pratibha,to review information relating to 2*AVB, AFib,  & PPM Insertion    Discussed the following topics: The Heart's Electrical System, 2* AVB, AF, PPM, Post Operative Care of PPM,  Arm Restrictions, & PPM DC Instructions     Handouts given on above topics given & questions answered. Patient verbalized understanding as evidenced by \"teach back\". Time spent with patient: 30 minutes.

## 2023-06-01 NOTE — PLAN OF CARE
Problem: Chronic Conditions and Co-morbidities  Goal: Patient's chronic conditions and co-morbidity symptoms are monitored and maintained or improved  6/1/2023 0842 by Oliverio Castillo RN  Outcome: Progressing  6/1/2023 0217 by Con Cooper RN  Outcome: Progressing     Problem: Discharge Planning  Goal: Discharge to home or other facility with appropriate resources  6/1/2023 0842 by Oliverio Castillo RN  Outcome: Progressing  6/1/2023 0217 by Con Cooper RN  Outcome: Progressing     Problem: Safety - Adult  Goal: Free from fall injury  6/1/2023 0842 by Oliverio Castillo RN  Outcome: Progressing  6/1/2023 0217 by Con Cooper RN  Outcome: Progressing     Problem: ABCDS Injury Assessment  Goal: Absence of physical injury  6/1/2023 0842 by Oliverio Castillo RN  Outcome: Progressing  6/1/2023 0217 by Con Cooper RN  Outcome: Progressing     Problem: Pain  Goal: Verbalizes/displays adequate comfort level or baseline comfort level  6/1/2023 0217 by Con Cooper RN  Outcome: Progressing

## 2023-06-01 NOTE — PROGRESS NOTES
Discharge instructions reviewed in detail with patient and wife, medications explained in detail with resumption of Eliqus in 6/03/2023 with verbal understanding. and follow up appointments reviewed.  Patient discharged with all belongings, denies pain or discomfort

## 2023-06-07 ENCOUNTER — TELEPHONE (OUTPATIENT)
Dept: NON INVASIVE DIAGNOSTICS | Age: 78
End: 2023-06-07

## 2023-06-07 NOTE — TELEPHONE ENCOUNTER
Patient's wife called. She is at her oncologist's office for an appointment, then treatment. She was concerned with the amount of ecchymosis of her 's chest. She had her physician look at her 's chest. The physician instructed her to contact our office. Mrs. Ceballos said the bruising is going towards his navel. Manjinder Duty restarted his Eliquis on Saturday. The patient feels okay. The area is not warm to touch or draining. Since she is receiving her chemotherapy today, I offered an appointment for tomorrow morning. He has another appointment at 0930, so I scheduled an appointment with us around 1100.      Breanna Chiang RN, BSN  [facility]

## 2023-06-08 ENCOUNTER — NURSE ONLY (OUTPATIENT)
Dept: NON INVASIVE DIAGNOSTICS | Age: 78
End: 2023-06-08

## 2023-06-08 DIAGNOSIS — Z95.0 PACEMAKER: ICD-10-CM

## 2023-06-08 DIAGNOSIS — I44.1 AV BLOCK, 2ND DEGREE: Primary | ICD-10-CM

## 2023-06-08 NOTE — PROGRESS NOTES
Patient here for a wound check. Patient's wife called yesterday with complaint of there being bruising and swelling at site. Because of her chemotherapy treatment, appointment scheduled for today. There is ecchymosis involving his chest, abdomen, and both arms. The pacemaker pocket is edematous, incision is approximated without drainage or erythema. CLOTILDE Kramer came in to assess incision site. Per Fatmata Lim, pressure dressing applied over left chest pacemaker incision. Instructed patient and his wife to leave dressing on until tomorrow at noon (per Fatmata Lim). Can apply ice to incision for 20 minutes at a time (do not put ice directly on skin) several times throughout the day. Instructed to hold Eliquis until Sunday, 6/11/23. Patient and his wife voiced understanding. Instructed to keep appointment as scheduled next Wednesday, 6/14/23. See media for picture. See Murj report for device check.     Ava Mustafa RN, BSN  [facility]

## 2023-06-08 NOTE — PROGRESS NOTES
Physician Progress Note      PATIENT:               Dante Torres  CSN #:                  [id number]  :                       1945  ADMIT DATE:       2023 8:12 PM  [address] DATE:        2023 7:57 PM  RESPONDING  PROVIDER #:        Keith Hayes MD          QUERY TEXT:    Dear Provider,    Patient admitted with 2* AVB/pseudo-pacemaker syndrome noted to have   paroxysmal atrial fibrillation and is maintained on Eliquis. If possible,   please document in progress notes and discharge summary if you are evaluating   and/or treating any of the following: The medical record reflects the following:  Risk Factors:  77 yo w/PAF, HTN, DM  Clinical Indicators: [id number] score = (age, HTN, DM). Recommend [address] and   may also score of 1 or  more. DOAC preferred. - Continue apixaban 5 mg twice daily. While on DOAC,   recommend annual  monitoring of CBC and CMP. Treatment: Eliquis 5 mg twice daily    Thank you,  Mac Maldonado RN  Clinical Documentation Improvement  [phone]  Options provided:  -- Secondary hypercoagulable state in a patient with atrial fibrillation  -- Other - I will add my own diagnosis  -- Disagree - Not applicable / Not valid  -- Disagree - Clinically unable to determine / Unknown  -- Refer to Clinical Documentation Reviewer    PROVIDER RESPONSE TEXT:    This patient has secondary hypercoagulable state in a patient with atrial   fibrillation.     Query created by: Vera Vasquez on 2023 8:52 AM      Electronically signed by:  Keith Hayes MD 2023 1:51 PM

## 2023-06-08 NOTE — PATIENT INSTRUCTIONS
Hold Eliquis until Sunday morning (6/11/2023)  Keep appointment 6/14/2023  Keep pressure dressing on until 6/9/2023 @ noon  Apply ice to area for 20 minutes at a time every few hours. Do not place ice directly on the skin, wrap ice bag in towel. Contact the office at [phone], ext [phone] with any questions.

## 2023-06-12 DIAGNOSIS — Z95.0 PACEMAKER: ICD-10-CM

## 2023-06-12 DIAGNOSIS — I44.1 AV BLOCK, 2ND DEGREE: ICD-10-CM

## 2023-06-12 LAB
ALBUMIN SERPL-MCNC: 4.3 G/DL (ref 3.5–5.2)
ALP SERPL-CCNC: 55 U/L (ref 40–129)
ALT SERPL-CCNC: 27 U/L (ref 0–40)
ANION GAP SERPL CALCULATED.3IONS-SCNC: 14 MMOL/L (ref 7–16)
AST SERPL-CCNC: 30 U/L (ref 0–39)
BILIRUB SERPL-MCNC: 0.5 MG/DL (ref 0–1.2)
BUN SERPL-MCNC: 21 MG/DL (ref 6–23)
CALCIUM SERPL-MCNC: 10 MG/DL (ref 8.6–10.2)
CHLORIDE SERPL-SCNC: 102 MMOL/L (ref 98–107)
CO2 SERPL-SCNC: 24 MMOL/L (ref 22–29)
CREAT SERPL-MCNC: 1.1 MG/DL (ref 0.7–1.2)
ERYTHROCYTE [DISTWIDTH] IN BLOOD BY AUTOMATED COUNT: 14 FL (ref 11.5–15)
GLUCOSE SERPL-MCNC: 200 MG/DL (ref 74–99)
HCT VFR BLD AUTO: 38.4 % (ref 37–54)
HGB BLD-MCNC: 12.2 G/DL (ref 12.5–16.5)
MCH RBC QN AUTO: 33.2 PG (ref 26–35)
MCHC RBC AUTO-ENTMCNC: 31.8 % (ref 32–34.5)
MCV RBC AUTO: 104.3 FL (ref 80–99.9)
PLATELET # BLD AUTO: 187 E9/L (ref 130–450)
PMV BLD AUTO: 11.3 FL (ref 7–12)
POTASSIUM SERPL-SCNC: 4.3 MMOL/L (ref 3.5–5)
PROT SERPL-MCNC: 7.7 G/DL (ref 6.4–8.3)
RBC # BLD AUTO: 3.68 E12/L (ref 3.8–5.8)
SODIUM SERPL-SCNC: 140 MMOL/L (ref 132–146)
WBC # BLD: 4.9 E9/L (ref 4.5–11.5)

## 2023-06-22 ENCOUNTER — NURSE ONLY (OUTPATIENT)
Dept: NON INVASIVE DIAGNOSTICS | Age: 78
End: 2023-06-22

## 2023-06-22 NOTE — PROGRESS NOTES
Here for wound check. See media for pictures of left upper chest incision. Patient has worn the Safeguard Focus Compression device as placed by Dr. Jesse Moncada on 6/15/23. Area around incision remains edematous and ecchymotic. Ecchymosis has significantly improved. CLOTILDE Talamantes came in to assess the incision. She told the patient he does not have to wear the compression device as he complains of discomfort from the device. Charity Jimenez would like Dr. Jesse Moncada to further assess the incision when he is available on Monday or Tuesday of next week. Patient instructed to remain off Eliquis per Charity Jimenez. Will reassess need to restart by Dr. Jesse Moncada.     Sean Yarbrough RN, BSN  [facility]

## 2023-06-22 NOTE — PATIENT INSTRUCTIONS
Continue arm restrictions until 7/13/23      Call if any signs or symptoms of infection [phone] ext: [phone]  Fevers, chills, redness, swelling or drainage.        Hook up home  [address]  [address] support (home monitoring) [phone]

## 2023-06-26 ENCOUNTER — TELEPHONE (OUTPATIENT)
Dept: NON INVASIVE DIAGNOSTICS | Age: 78
End: 2023-06-26

## 2023-06-29 ENCOUNTER — TELEPHONE (OUTPATIENT)
Dept: NON INVASIVE DIAGNOSTICS | Age: 78
End: 2023-06-29

## 2023-06-30 ENCOUNTER — TELEPHONE (OUTPATIENT)
Dept: NON INVASIVE DIAGNOSTICS | Age: 78
End: 2023-06-30

## 2023-06-30 ENCOUNTER — NURSE ONLY (OUTPATIENT)
Dept: NON INVASIVE DIAGNOSTICS | Age: 78
End: 2023-06-30

## 2023-06-30 DIAGNOSIS — I48.91 ATRIAL FIBRILLATION, UNSPECIFIED TYPE (HCC): Primary | ICD-10-CM

## 2023-06-30 DIAGNOSIS — Z95.0 PACEMAKER: ICD-10-CM

## 2023-06-30 DIAGNOSIS — I44.1 AV BLOCK, 2ND DEGREE: ICD-10-CM

## 2023-07-14 ENCOUNTER — NURSE ONLY (OUTPATIENT)
Dept: NON INVASIVE DIAGNOSTICS | Age: 78
End: 2023-07-14

## 2023-07-14 DIAGNOSIS — Z95.0 PACEMAKER: Primary | ICD-10-CM

## 2023-07-14 DIAGNOSIS — I48.0 PAF (PAROXYSMAL ATRIAL FIBRILLATION) (HCC): ICD-10-CM

## 2023-07-14 DIAGNOSIS — I44.1 AV BLOCK, 2ND DEGREE: ICD-10-CM

## 2023-07-14 NOTE — PROGRESS NOTES
Here for wound assessment (see pictures in media). Ecchymosis is almost completely gone. Edema has improved as well. Patient and his wife still voiced concern about hematoma. They would like to continue using the compression device at least a couple of times a day. I scheduled another wound assessment on 8/2/2023. See Murj report for atrial fibrillation burden.     Enmanuel Carranza RN, BSN  [facility]

## 2023-07-24 ENCOUNTER — TELEPHONE (OUTPATIENT)
Dept: CARDIOLOGY CLINIC | Age: 78
End: 2023-07-24

## 2023-07-24 NOTE — TELEPHONE ENCOUNTER
Wife say pt. Is feeling \"tired\". She says his legs feel weak just [place] and [place]. Falls asleep easily. No difference from before Pacemaker.     Please Advise

## 2023-07-25 ENCOUNTER — TELEPHONE (OUTPATIENT)
Dept: NON INVASIVE DIAGNOSTICS | Age: 78
End: 2023-07-25

## 2023-07-25 NOTE — TELEPHONE ENCOUNTER
Santino Betancur called to verify when Sidney Calvo restarted his Eliquis prior to planned cardioversion. I informed Santino Betancur he restarted the Eliquis on 6/30/23. She wanted to know if he should keep his incision check as planned on 8/2/2023. She told me it is still somewhat edematous. I told her they can keep the appointment. She voiced understanding.     Timothy Salinas RN, BSN  [facility]

## 2023-07-28 ENCOUNTER — TELEPHONE (OUTPATIENT)
Dept: NON INVASIVE DIAGNOSTICS | Age: 78
End: 2023-07-28

## 2023-07-28 DIAGNOSIS — I48.0 PAF (PAROXYSMAL ATRIAL FIBRILLATION) (HCC): Primary | ICD-10-CM

## 2023-07-28 NOTE — TELEPHONE ENCOUNTER
----- Message from Amos Saldivar DO sent at 7/23/2023 10:50 PM EDT -----  Regarding: af  Patient has been in AF with CVR since 7/4/2023. Patient started back on Eliquis on 6/30/2023. Recommend DCCV.     Amos Saldivar DO
Patient is scheduled for DCCV w/DR Marlene Delcid on 8/8/23. Instructions given and verbalized understanding.
PAST SURGICAL HISTORY:  H/O  section

## 2023-08-02 ENCOUNTER — NURSE ONLY (OUTPATIENT)
Dept: NON INVASIVE DIAGNOSTICS | Age: 78
End: 2023-08-02

## 2023-08-02 NOTE — PROGRESS NOTES
Here for wound assessment. Patient had Safeguard Focus Compression device over left chest incision. See picture in media. Incision well approximated, free of ecchymosis and erythema. Minimal swelling noted at pocket site. Informed patient and his wife they no longer need to use the compression device as the incision looks good. Call the office if any drainage or concerns. Patient is scheduled for a DCCV on 8/8/23. Patient will send a remote Latitude transmission when he gets home to see if he is still in AF.     Esther Morel RN, BSN  [facility]

## 2023-08-03 ENCOUNTER — TELEPHONE (OUTPATIENT)
Dept: NON INVASIVE DIAGNOSTICS | Age: 78
End: 2023-08-03

## 2023-08-03 NOTE — TELEPHONE ENCOUNTER
Feliz Flower called to see if they need to take the home monitor on vacation. I said if they are driving they can take the monitor so we can get a one week post DCCV remote. They are flying. I told them not to bother. We can get remote transmission when they return home.     Horacio Lockett RN, BSN  [facility]

## 2023-08-04 ENCOUNTER — HOSPITAL ENCOUNTER (OUTPATIENT)
Age: 78
Discharge: HOME | End: 2023-08-04
Payer: MEDICARE

## 2023-08-04 DIAGNOSIS — I48.0 PAF (PAROXYSMAL ATRIAL FIBRILLATION) (HCC): ICD-10-CM

## 2023-08-04 LAB
ANION GAP SERPL CALCULATED.3IONS-SCNC: 15 MMOL/L (ref 7–16)
BUN SERPL-MCNC: 17 MG/DL (ref 6–23)
CALCIUM SERPL-MCNC: 9.4 MG/DL (ref 8.6–10.2)
CHLORIDE SERPL-SCNC: 103 MMOL/L (ref 98–107)
CO2 SERPL-SCNC: 23 MMOL/L (ref 22–29)
CREAT SERPL-MCNC: 1.2 MG/DL (ref 0.7–1.2)
ERYTHROCYTE [DISTWIDTH] IN BLOOD BY AUTOMATED COUNT: 13.2 % (ref 11.5–15)
GFR SERPL CREATININE-BSD FRML MDRD: >60 ML/MIN/1.73M2
GLUCOSE SERPL-MCNC: 173 MG/DL (ref 74–99)
HCT VFR BLD AUTO: 40.7 % (ref 37–54)
HGB BLD-MCNC: 13.2 G/DL (ref 12.5–16.5)
MAGNESIUM SERPL-MCNC: 1.6 MG/DL (ref 1.6–2.6)
MCH RBC QN AUTO: 32.9 PG (ref 26–35)
MCHC RBC AUTO-ENTMCNC: 32.4 G/DL (ref 32–34.5)
MCV RBC AUTO: 101.5 FL (ref 80–99.9)
PLATELET # BLD AUTO: 138 K/UL (ref 130–450)
PMV BLD AUTO: 10.5 FL (ref 7–12)
POTASSIUM SERPL-SCNC: 4.5 MMOL/L (ref 3.5–5)
RBC # BLD AUTO: 4.01 M/UL (ref 3.8–5.8)
SODIUM SERPL-SCNC: 141 MMOL/L (ref 132–146)
WBC OTHER # BLD: 4.7 K/UL (ref 4.5–11.5)

## 2023-08-04 PROCEDURE — 80048 BASIC METABOLIC PNL TOTAL CA: CPT

## 2023-08-04 PROCEDURE — 36415 COLL VENOUS BLD VENIPUNCTURE: CPT

## 2023-08-04 PROCEDURE — 83735 ASSAY OF MAGNESIUM: CPT

## 2023-08-04 PROCEDURE — 85027 COMPLETE CBC AUTOMATED: CPT

## 2023-08-07 ENCOUNTER — TELEPHONE (OUTPATIENT)
Dept: CARDIAC CATH/INVASIVE PROCEDURES | Age: 78
End: 2023-08-07

## 2023-08-08 ENCOUNTER — HOSPITAL ENCOUNTER (OUTPATIENT)
Dept: CARDIAC CATH/INVASIVE PROCEDURES | Age: 78
Discharge: HOME | End: 2023-08-08
Payer: MEDICARE

## 2023-08-08 ENCOUNTER — ANESTHESIA EVENT (OUTPATIENT)
Dept: CARDIAC CATH/INVASIVE PROCEDURES | Age: 78
End: 2023-08-08

## 2023-08-08 ENCOUNTER — ANESTHESIA (OUTPATIENT)
Dept: CARDIAC CATH/INVASIVE PROCEDURES | Age: 78
End: 2023-08-08

## 2023-08-08 VITALS
RESPIRATION RATE: 12 BRPM | BODY MASS INDEX: 32.51 KG/M2 | WEIGHT: 240 LBS | HEART RATE: 70 BPM | OXYGEN SATURATION: 100 % | SYSTOLIC BLOOD PRESSURE: 161 MMHG | HEIGHT: 72 IN | TEMPERATURE: 97.6 F | DIASTOLIC BLOOD PRESSURE: 85 MMHG

## 2023-08-08 PROCEDURE — 2709999900 HC NON-CHARGEABLE SUPPLY

## 2023-08-08 PROCEDURE — 3700000000 HC ANESTHESIA ATTENDED CARE

## 2023-08-08 PROCEDURE — 6360000002 HC RX W HCPCS: Performed by: NURSE ANESTHETIST, CERTIFIED REGISTERED

## 2023-08-08 PROCEDURE — 92960 CARDIOVERSION ELECTRIC EXT: CPT | Performed by: STUDENT IN AN ORGANIZED HEALTH CARE EDUCATION/TRAINING PROGRAM

## 2023-08-08 PROCEDURE — 92960 CARDIOVERSION ELECTRIC EXT: CPT

## 2023-08-08 PROCEDURE — 2580000003 HC RX 258: Performed by: NURSE ANESTHETIST, CERTIFIED REGISTERED

## 2023-08-08 RX ORDER — SODIUM CHLORIDE 9 MG/ML
INJECTION, SOLUTION INTRAVENOUS CONTINUOUS PRN
Status: DISCONTINUED | OUTPATIENT
Start: 2023-08-08 | End: 2023-08-08 | Stop reason: SDUPTHER

## 2023-08-08 RX ORDER — PROPOFOL 10 MG/ML
INJECTION, EMULSION INTRAVENOUS PRN
Status: DISCONTINUED | OUTPATIENT
Start: 2023-08-08 | End: 2023-08-08 | Stop reason: SDUPTHER

## 2023-08-08 RX ADMIN — SODIUM CHLORIDE: 9 INJECTION, SOLUTION INTRAVENOUS at 13:35

## 2023-08-08 RX ADMIN — PROPOFOL 50 MG: 10 INJECTION, EMULSION INTRAVENOUS at 13:38

## 2023-08-08 NOTE — H&P
[facility] CARDIOLOGY  CARDIAC ELECTROPHYSIOLOGY DEPARTMENT/DIVISION OF CARDIOLOGY  H&P Report  PATIENT: Meño Calderón  MEDICAL RECORD NUMBER: [id number]  DATE OF SERVICE:  2023  ATTENDING ELECTROPHYSIOLOGIST:  Dariel Adkins D.O.  REFERRING PHYSICIAN: Sandee Tran DO and Brandan Cohn MD  CHIEF COMPLAINT: PAF    HPI: Meño Calderón is a 78 y.o. male with a history of nonvalvular paroxysmal AF, 2* AV block sp BSCI dc PPM (DOI: 23- Dr Savannah Vargas), HTN, DM 2, obesity, sleep apnea, GERD/Alonzo's, hemorrhoids, and BPH. He is managed by Dr. Rick Knight with amlodipine 5 mg daily, apixaban 5 mg twice daily, and PPI. In , patient was diagnosed with nonvalvular paroxysmal AF, which was treated with [address] Around this time, he was noted to have first-degree AV block (300 ms). In , patient was noted to have episodes of 2* AV block. In , [place] interval had progressed to > 300 ms. In 2023, he was referred to my office. He complained of dyspnea on exertion as well as fatigue. 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 In 2023, he had AF recurrence. He presents today, 2023; for outpatient DCCV. His PPM is enrolled in remote monitoring, which most recently reported stable device function, RA pacing 0%, RV pacing 87%,  and AF ongoing since 23. He is compliant with OAC > 3 weeks. He reports fatigue with AF. He denies any other complaints at this time. Prior cardiac testing:   TTE (23): LVEF = 60%, severe concentric LVH, moderate AS, mild TR.  EC23: Sinus at 75 bpm, first-degree AV block (440 ms)  ECG 2021: normal sinus rhythm, LAFB, rate: 87 bpm   24 hour HM: 2020: has 2:1 av block mostly at night. He is asymptomatic. I discussed pacemaker but he prefers to monitor for now as he is asymptomatic. Monitor is not that much different from last one 2 yrs ago.  Cont to monitor   TTE: 2018: LVEF: 65%, No

## 2023-08-08 NOTE — DISCHARGE INSTRUCTIONS
DISCHARGE INSTRUCTIONS:  - Medication changes: Continue all other prescribed medications. - Activity/driving: no driving or operating heavy machinery for 24 hours. - Follow-up:   -- Pacemaker interrogation: a remote interrogation of pacemaker will be performed on ~8/20/23 after you return from vacation. -- Dr Vincent Jessica Office: you are scheduled for an appointment with Dr Vincent Jessica office on 12/14/2023 3:00 PM. If you need to reschedule, please call [phone]. - Questions/concerns: please call [phone].

## 2023-08-09 ENCOUNTER — TELEPHONE (OUTPATIENT)
Dept: NON INVASIVE DIAGNOSTICS | Age: 78
End: 2023-08-09

## 2023-08-09 NOTE — TELEPHONE ENCOUNTER
Call from patient's wife questioning the lab appointment that is scheduled on 8/15/2023. Patient will be out of town. Called and spoke with Mrs. Ceballos. Cancelled one week EKG on 8/15/2023. Patient will be out of town. Will schedule wireless remote for 8/21/2023.       [address] [place] [address]

## 2023-08-09 NOTE — TELEPHONE ENCOUNTER
----- Message from Nicki Tuttle DO sent at 8/8/2023  1:53 PM EDT -----  Regarding: appt  DCCV today. Remote after patient returns from vacation on 8/20/23. Appt with me in 12/14/2023 3:00 PM already scheduled.     Nicki Tuttle DO

## 2023-08-09 NOTE — ANESTHESIA POSTPROCEDURE EVALUATION
Department of Anesthesiology  Postprocedure Note    Patient: Gabino Guerra  MRN: [id number]  YOB: 1945  Date of evaluation: 8/9/2023      Procedure Summary     Date: 08/08/23 Room / Location: [facility] CATH LAB    Anesthesia Start: 7007 Anesthesia Stop: 1070    Procedure: CARDIOVERSION WITH ANESTHESIA Diagnosis:       Paroxysmal atrial fibrillation      Atrioventricular block, second degree    Scheduled Providers:  Responsible Provider: Elaina Mendoza MD    Anesthesia Type: MAC ASA Status: 4          Anesthesia Type: No value filed.     Roya Phase I:      Roya Phase II:        Anesthesia Post Evaluation    Patient location during evaluation: PACU  Patient participation: complete - patient participated  Level of consciousness: awake  Pain score: 3  Airway patency: patent  Nausea & Vomiting: no nausea and no vomiting  Complications: no  Cardiovascular status: blood pressure returned to baseline  Respiratory status: acceptable  Hydration status: euvolemic

## 2023-08-10 ENCOUNTER — OFFICE VISIT (OUTPATIENT)
Dept: CARDIOLOGY CLINIC | Age: 78
End: 2023-08-10

## 2023-08-10 VITALS
HEIGHT: 72 IN | HEART RATE: 78 BPM | DIASTOLIC BLOOD PRESSURE: 64 MMHG | RESPIRATION RATE: 16 BRPM | SYSTOLIC BLOOD PRESSURE: 160 MMHG | BODY MASS INDEX: 32.51 KG/M2 | WEIGHT: 240 LBS

## 2023-08-10 DIAGNOSIS — I44.1 AV BLOCK, 2ND DEGREE: ICD-10-CM

## 2023-08-10 DIAGNOSIS — I35.0 NONRHEUMATIC AORTIC VALVE STENOSIS: ICD-10-CM

## 2023-08-10 DIAGNOSIS — Z95.0 STATUS POST PLACEMENT OF CARDIAC PACEMAKER: ICD-10-CM

## 2023-08-10 DIAGNOSIS — I10 ESSENTIAL HYPERTENSION: ICD-10-CM

## 2023-08-10 DIAGNOSIS — I48.0 PAF (PAROXYSMAL ATRIAL FIBRILLATION) (HCC): Primary | ICD-10-CM

## 2023-08-10 NOTE — PROGRESS NOTES
OFFICE VISIT     PRIMARY CARE PHYSICIAN:      Grover Colmenares MD       ALLERGIES / SENSITIVITIES:      No Known Allergies       REVIEWED MEDICATIONS:        Current Outpatient Medications:     NONFORMULARY, Uric Acid Flush, Disp: , Rfl:     amLODIPine (NORVASC) 5 MG tablet, Take 1 tablet by mouth daily, Disp: 90 tablet, Rfl: 3    glimepiride (AMARYL) 4 MG tablet, Take 1 tablet by mouth every morning (before breakfast), Disp: 30 tablet, Rfl: 3    tamsulosin (FLOMAX) 0.4 MG capsule, Take 1 capsule by mouth daily, Disp: 30 capsule, Rfl: 0    allopurinol (ZYLOPRIM) 100 MG tablet, allopurinol 100 mg tablet  Take 2 tablets every day by oral route., Disp: , Rfl:     apixaban (ELIQUIS) 5 MG TABS tablet, Take 1 tablet by mouth 2 times daily, Disp: 60 tablet, Rfl: 0    Multiple Vitamins-Minerals (CENTRUM ULTRA MENS) TABS, Take 1 tablet by mouth daily , Disp: , Rfl:     omeprazole (PRILOSEC) 20 MG capsule, Take 1 capsule by mouth daily, Disp: , Rfl:     metFORMIN (GLUCOPHAGE) 1000 MG tablet, Take 1 tablet by mouth 2 times daily (with meals), Disp: , Rfl:       S: REASON FOR VISIT:       Chief Complaint   Patient presents with    Atrial Fibrillation     9 mo ov- c/o leg weakness          History of Present Illness:       Office Visit for follow up of A Fib, VHD, DORCAS              78year old with history of A fib, VHD, s/p Pacemaker, DORCAS came follow up   Previous Pt of Dr Kinga Segovia. 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    Fairly active at home   Try to watch diet          Past Medical History:   Diagnosis Date    Arthritis     Atrial fibrillation (HCC)     Alonzo's esophagus     BPH (benign prostatic hypertrophy)

## 2023-08-15 ENCOUNTER — TELEPHONE (OUTPATIENT)
Dept: NON INVASIVE DIAGNOSTICS | Age: 78
End: 2023-08-15

## 2023-08-15 DIAGNOSIS — Z51.81 ENCOUNTER FOR MONITORING AMIODARONE THERAPY: Primary | ICD-10-CM

## 2023-08-15 DIAGNOSIS — Z79.899 ENCOUNTER FOR MONITORING AMIODARONE THERAPY: Primary | ICD-10-CM

## 2023-08-15 DIAGNOSIS — I48.0 PAF (PAROXYSMAL ATRIAL FIBRILLATION) (HCC): ICD-10-CM

## 2023-08-15 NOTE — TELEPHONE ENCOUNTER
----- Message from Viry Wheeler DO sent at 8/14/2023  9:41 PM EDT -----  Regarding: AF  AF/AFL returned a few days after DCCV. Recommend amiodarone 200 mg BID x 28 days, then 200 mg daily. Please have him obtain TSH and Free T4 before starting. Repeat remote of PPM in ~4 weeks. If remains in AF/AFL, please arrange DCCV.     Viry Wheeler DO

## 2023-08-15 NOTE — TELEPHONE ENCOUNTER
LM to call office, lab orders in Epic.     Electronically signed by Ryder Ocasio MA on 8/15/2023 at 9:48 AM

## 2023-08-18 NOTE — TELEPHONE ENCOUNTER
Patient is on vacation and will return next week to get labs done.      Electronically signed by Wen Vazquez MA on 8/18/2023 at 8:57 AM

## 2023-08-21 ENCOUNTER — TELEPHONE (OUTPATIENT)
Dept: NON INVASIVE DIAGNOSTICS | Age: 78
End: 2023-08-21

## 2023-08-21 ENCOUNTER — HOSPITAL ENCOUNTER (OUTPATIENT)
Age: 78
Discharge: HOME | End: 2023-08-21
Payer: MEDICARE

## 2023-08-21 DIAGNOSIS — Z51.81 ENCOUNTER FOR MONITORING AMIODARONE THERAPY: ICD-10-CM

## 2023-08-21 DIAGNOSIS — I48.0 PAF (PAROXYSMAL ATRIAL FIBRILLATION) (HCC): ICD-10-CM

## 2023-08-21 DIAGNOSIS — Z79.899 ENCOUNTER FOR MONITORING AMIODARONE THERAPY: ICD-10-CM

## 2023-08-21 DIAGNOSIS — Z51.81 ENCOUNTER FOR MONITORING AMIODARONE THERAPY: Primary | ICD-10-CM

## 2023-08-21 DIAGNOSIS — Z79.899 ENCOUNTER FOR MONITORING AMIODARONE THERAPY: Primary | ICD-10-CM

## 2023-08-21 LAB
T4 FREE SERPL-MCNC: 1.3 NG/DL (ref 0.9–1.7)
TSH SERPL DL<=0.05 MIU/L-ACNC: 3.49 UIU/ML (ref 0.27–4.2)

## 2023-08-21 PROCEDURE — 36415 COLL VENOUS BLD VENIPUNCTURE: CPT

## 2023-08-21 PROCEDURE — 84443 ASSAY THYROID STIM HORMONE: CPT

## 2023-08-21 PROCEDURE — 84439 ASSAY OF FREE THYROXINE: CPT

## 2023-08-21 NOTE — TELEPHONE ENCOUNTER
I called Juan Jose Capellan and informed her Dr. Marlene Delcid would like Rustam Sanchez to have a CXR and some adjustments on his atrial lead sensitivity. She has an appointment this coming Friday, 8/25/2023. They will come in this day for device check.     Danielle Campos RN, BSN  [facility]

## 2023-08-21 NOTE — TELEPHONE ENCOUNTER
----- Message from Bjorn Kearney RN sent at 8/21/2023  1:51 PM EDT -----  Regarding: FW: RA lead    ----- Message -----  From: Aniyah Milian DO  Sent: 8/19/2023  12:30 PM EDT  To: Bjorn Kearney RN  Subject: RA lead                                          Ra undersensing in sinus on remote. Please have patient get PA and LAT CXR.     Please bring patient into device clinic AFTER CXR to reprogram device to avoid RA undersensing.    -Aniyah Milian DO

## 2023-08-25 ENCOUNTER — HOSPITAL ENCOUNTER (OUTPATIENT)
Dept: GENERAL RADIOLOGY | Age: 78
End: 2023-08-25
Payer: MEDICARE

## 2023-08-25 ENCOUNTER — NURSE ONLY (OUTPATIENT)
Dept: NON INVASIVE DIAGNOSTICS | Age: 78
End: 2023-08-25

## 2023-08-25 ENCOUNTER — HOSPITAL ENCOUNTER (OUTPATIENT)
Age: 78
End: 2023-08-25
Payer: MEDICARE

## 2023-08-25 DIAGNOSIS — I44.1 AV BLOCK, 2ND DEGREE: ICD-10-CM

## 2023-08-25 DIAGNOSIS — Z51.81 ENCOUNTER FOR MONITORING AMIODARONE THERAPY: ICD-10-CM

## 2023-08-25 DIAGNOSIS — Z95.0 PACEMAKER: Primary | ICD-10-CM

## 2023-08-25 DIAGNOSIS — I48.0 PAF (PAROXYSMAL ATRIAL FIBRILLATION) (HCC): ICD-10-CM

## 2023-08-25 DIAGNOSIS — Z79.899 ENCOUNTER FOR MONITORING AMIODARONE THERAPY: ICD-10-CM

## 2023-08-25 PROCEDURE — 71046 X-RAY EXAM CHEST 2 VIEWS: CPT

## 2023-08-28 ENCOUNTER — TELEPHONE (OUTPATIENT)
Dept: NON INVASIVE DIAGNOSTICS | Age: 78
End: 2023-08-28

## 2023-08-28 NOTE — TELEPHONE ENCOUNTER
I spoke with Anya Omer and informed her Dr. Cecilia Jerez stated the CXR shows atrial lead in stable position. She voiced understanding.     Chico Poe RN, BSN  [facility]

## 2023-09-11 ENCOUNTER — HOSPITAL ENCOUNTER (INPATIENT)
Age: 78
LOS: 3 days | Discharge: HOME | End: 2023-09-14
Attending: INTERNAL MEDICINE | Admitting: INTERNAL MEDICINE
Payer: MEDICARE

## 2023-09-11 ENCOUNTER — APPOINTMENT (OUTPATIENT)
Dept: CT IMAGING | Age: 78
End: 2023-09-11
Payer: MEDICARE

## 2023-09-11 DIAGNOSIS — L03.211 FACIAL CELLULITIS: Primary | ICD-10-CM

## 2023-09-11 DIAGNOSIS — L02.01 FACIAL ABSCESS: ICD-10-CM

## 2023-09-11 LAB
ALBUMIN SERPL-MCNC: 4.1 G/DL (ref 3.5–5.2)
ALP SERPL-CCNC: 63 U/L (ref 40–129)
ALT SERPL-CCNC: 18 U/L (ref 0–40)
ANION GAP SERPL CALCULATED.3IONS-SCNC: 17 MMOL/L (ref 7–16)
AST SERPL-CCNC: 15 U/L (ref 0–39)
BASOPHILS # BLD: 0.02 K/UL (ref 0–0.2)
BASOPHILS NFR BLD: 0 % (ref 0–2)
BILIRUB SERPL-MCNC: 0.7 MG/DL (ref 0–1.2)
BUN SERPL-MCNC: 27 MG/DL (ref 6–23)
CALCIUM SERPL-MCNC: 9.3 MG/DL (ref 8.6–10.2)
CHLORIDE SERPL-SCNC: 99 MMOL/L (ref 98–107)
CO2 SERPL-SCNC: 22 MMOL/L (ref 22–29)
CREAT SERPL-MCNC: 1.4 MG/DL (ref 0.7–1.2)
CRP SERPL HS-MCNC: 113 MG/L (ref 0–5)
EOSINOPHIL # BLD: 0.02 K/UL (ref 0.05–0.5)
EOSINOPHILS RELATIVE PERCENT: 0 % (ref 0–6)
ERYTHROCYTE [DISTWIDTH] IN BLOOD BY AUTOMATED COUNT: 13 % (ref 11.5–15)
GFR SERPL CREATININE-BSD FRML MDRD: 50 ML/MIN/1.73M2
GLUCOSE BLD-MCNC: 333 MG/DL (ref 74–99)
GLUCOSE SERPL-MCNC: 255 MG/DL (ref 74–99)
HCT VFR BLD AUTO: 39.6 % (ref 37–54)
HGB BLD-MCNC: 13.6 G/DL (ref 12.5–16.5)
IMM GRANULOCYTES # BLD AUTO: 0.08 K/UL (ref 0–0.58)
IMM GRANULOCYTES NFR BLD: 1 % (ref 0–5)
LACTATE BLDV-SCNC: 2.5 MMOL/L (ref 0.5–2.2)
LYMPHOCYTES NFR BLD: 1.03 K/UL (ref 1.5–4)
LYMPHOCYTES RELATIVE PERCENT: 10 % (ref 20–42)
MCH RBC QN AUTO: 33.3 PG (ref 26–35)
MCHC RBC AUTO-ENTMCNC: 34.3 G/DL (ref 32–34.5)
MCV RBC AUTO: 96.8 FL (ref 80–99.9)
MONOCYTES NFR BLD: 1.28 K/UL (ref 0.1–0.95)
MONOCYTES NFR BLD: 12 % (ref 2–12)
NEUTROPHILS NFR BLD: 77 % (ref 43–80)
NEUTS SEG NFR BLD: 8.29 K/UL (ref 1.8–7.3)
PLATELET # BLD AUTO: 164 K/UL (ref 130–450)
PMV BLD AUTO: 11.1 FL (ref 7–12)
POTASSIUM SERPL-SCNC: 4.1 MMOL/L (ref 3.5–5)
PROT SERPL-MCNC: 7.7 G/DL (ref 6.4–8.3)
RBC # BLD AUTO: 4.09 M/UL (ref 3.8–5.8)
SODIUM SERPL-SCNC: 138 MMOL/L (ref 132–146)
WBC OTHER # BLD: 10.7 K/UL (ref 4.5–11.5)

## 2023-09-11 PROCEDURE — 80053 COMPREHEN METABOLIC PANEL: CPT

## 2023-09-11 PROCEDURE — 87040 BLOOD CULTURE FOR BACTERIA: CPT

## 2023-09-11 PROCEDURE — 85025 COMPLETE CBC W/AUTO DIFF WBC: CPT

## 2023-09-11 PROCEDURE — 86140 C-REACTIVE PROTEIN: CPT

## 2023-09-11 PROCEDURE — 2580000003 HC RX 258

## 2023-09-11 PROCEDURE — 99285 EMERGENCY DEPT VISIT HI MDM: CPT

## 2023-09-11 PROCEDURE — 70491 CT SOFT TISSUE NECK W/DYE: CPT

## 2023-09-11 PROCEDURE — 82962 GLUCOSE BLOOD TEST: CPT

## 2023-09-11 PROCEDURE — 2140000000 HC CCU INTERMEDIATE R&B

## 2023-09-11 PROCEDURE — 83605 ASSAY OF LACTIC ACID: CPT

## 2023-09-11 RX ORDER — NEOMYCIN SULFATE, POLYMYXIN B SULFATE AND HYDROCORTISONE 10; 3.5; 1 MG/ML; MG/ML; [USP'U]/ML
SUSPENSION/ DROPS AURICULAR (OTIC)
COMMUNITY
Start: 2023-05-03

## 2023-09-11 RX ORDER — PREDNISONE 20 MG/1
TABLET ORAL
Status: ON HOLD | COMMUNITY
Start: 2023-09-09 | End: 2023-09-14 | Stop reason: HOSPADM

## 2023-09-11 RX ORDER — 0.9 % SODIUM CHLORIDE 0.9 %
1000 INTRAVENOUS SOLUTION INTRAVENOUS ONCE
Status: COMPLETED | OUTPATIENT
Start: 2023-09-11 | End: 2023-09-11

## 2023-09-11 RX ORDER — SODIUM CHLORIDE 0.9 % (FLUSH) 0.9 %
10 SYRINGE (ML) INJECTION
Status: ACTIVE | OUTPATIENT
Start: 2023-09-11 | End: 2023-09-12

## 2023-09-11 RX ORDER — COLCHICINE 0.6 MG/1
TABLET ORAL
Status: ON HOLD | COMMUNITY
Start: 2023-08-05 | End: 2023-09-14 | Stop reason: HOSPADM

## 2023-09-11 RX ORDER — FINASTERIDE 5 MG/1
5 TABLET, FILM COATED ORAL DAILY
COMMUNITY
Start: 2023-08-28

## 2023-09-11 RX ADMIN — SODIUM CHLORIDE 1000 ML: 9 INJECTION, SOLUTION INTRAVENOUS at 11:40

## 2023-09-11 ASSESSMENT — PAIN DESCRIPTION - LOCATION: LOCATION: FACE

## 2023-09-11 ASSESSMENT — LIFESTYLE VARIABLES
HOW MANY STANDARD DRINKS CONTAINING ALCOHOL DO YOU HAVE ON A TYPICAL DAY: PATIENT DOES NOT DRINK
HOW OFTEN DO YOU HAVE A DRINK CONTAINING ALCOHOL: NEVER

## 2023-09-11 ASSESSMENT — PAIN SCALES - GENERAL: PAINLEVEL_OUTOF10: 6

## 2023-09-11 ASSESSMENT — PAIN DESCRIPTION - FREQUENCY: FREQUENCY: CONTINUOUS

## 2023-09-12 LAB
ANION GAP SERPL CALCULATED.3IONS-SCNC: 18 MMOL/L (ref 7–16)
BASOPHILS # BLD: 0.01 K/UL (ref 0–0.2)
BASOPHILS NFR BLD: 0 % (ref 0–2)
BUN SERPL-MCNC: 29 MG/DL (ref 6–23)
CALCIUM SERPL-MCNC: 9.1 MG/DL (ref 8.6–10.2)
CHLORIDE SERPL-SCNC: 98 MMOL/L (ref 98–107)
CO2 SERPL-SCNC: 20 MMOL/L (ref 22–29)
CREAT SERPL-MCNC: 1.5 MG/DL (ref 0.7–1.2)
EOSINOPHIL # BLD: 0.03 K/UL (ref 0.05–0.5)
EOSINOPHILS RELATIVE PERCENT: 0 % (ref 0–6)
ERYTHROCYTE [DISTWIDTH] IN BLOOD BY AUTOMATED COUNT: 13.5 % (ref 11.5–15)
GFR SERPL CREATININE-BSD FRML MDRD: 49 ML/MIN/1.73M2
GLUCOSE BLD-MCNC: 128 MG/DL (ref 74–99)
GLUCOSE BLD-MCNC: 278 MG/DL (ref 74–99)
GLUCOSE BLD-MCNC: 415 MG/DL (ref 74–99)
GLUCOSE BLD-MCNC: 450 MG/DL (ref 74–99)
GLUCOSE SERPL-MCNC: 264 MG/DL (ref 74–99)
HCT VFR BLD AUTO: 37.7 % (ref 37–54)
HGB BLD-MCNC: 12.4 G/DL (ref 12.5–16.5)
IMM GRANULOCYTES # BLD AUTO: 0.05 K/UL (ref 0–0.58)
IMM GRANULOCYTES NFR BLD: 1 % (ref 0–5)
LACTATE BLDV-SCNC: 1.7 MMOL/L (ref 0.5–2.2)
LYMPHOCYTES NFR BLD: 1.77 K/UL (ref 1.5–4)
LYMPHOCYTES RELATIVE PERCENT: 19 % (ref 20–42)
MCH RBC QN AUTO: 32.7 PG (ref 26–35)
MCHC RBC AUTO-ENTMCNC: 32.9 G/DL (ref 32–34.5)
MCV RBC AUTO: 99.5 FL (ref 80–99.9)
MONOCYTES NFR BLD: 1.2 K/UL (ref 0.1–0.95)
MONOCYTES NFR BLD: 13 % (ref 2–12)
NEUTROPHILS NFR BLD: 68 % (ref 43–80)
NEUTS SEG NFR BLD: 6.49 K/UL (ref 1.8–7.3)
PLATELET # BLD AUTO: 161 K/UL (ref 130–450)
PMV BLD AUTO: 11.4 FL (ref 7–12)
POTASSIUM SERPL-SCNC: 4.1 MMOL/L (ref 3.5–5)
RBC # BLD AUTO: 3.79 M/UL (ref 3.8–5.8)
SODIUM SERPL-SCNC: 136 MMOL/L (ref 132–146)
WBC OTHER # BLD: 9.6 K/UL (ref 4.5–11.5)

## 2023-09-12 PROCEDURE — 80048 BASIC METABOLIC PNL TOTAL CA: CPT

## 2023-09-12 PROCEDURE — 82962 GLUCOSE BLOOD TEST: CPT

## 2023-09-12 PROCEDURE — 6370000000 HC RX 637 (ALT 250 FOR IP): Performed by: INTERNAL MEDICINE

## 2023-09-12 PROCEDURE — 97530 THERAPEUTIC ACTIVITIES: CPT

## 2023-09-12 PROCEDURE — 36415 COLL VENOUS BLD VENIPUNCTURE: CPT

## 2023-09-12 PROCEDURE — 6370000000 HC RX 637 (ALT 250 FOR IP): Performed by: NURSE PRACTITIONER

## 2023-09-12 PROCEDURE — 85025 COMPLETE CBC W/AUTO DIFF WBC: CPT

## 2023-09-12 PROCEDURE — 2580000003 HC RX 258: Performed by: NURSE PRACTITIONER

## 2023-09-12 PROCEDURE — 97161 PT EVAL LOW COMPLEX 20 MIN: CPT

## 2023-09-12 PROCEDURE — 2140000000 HC CCU INTERMEDIATE R&B

## 2023-09-12 PROCEDURE — 83605 ASSAY OF LACTIC ACID: CPT

## 2023-09-12 PROCEDURE — 6360000002 HC RX W HCPCS: Performed by: NURSE PRACTITIONER

## 2023-09-12 RX ORDER — INSULIN LISPRO 100 [IU]/ML
7 INJECTION, SOLUTION INTRAVENOUS; SUBCUTANEOUS ONCE
Status: COMPLETED | OUTPATIENT
Start: 2023-09-12 | End: 2023-09-12

## 2023-09-12 RX ORDER — SODIUM CHLORIDE 0.9 % (FLUSH) 0.9 %
5-40 SYRINGE (ML) INJECTION PRN
Status: DISCONTINUED | OUTPATIENT
Start: 2023-09-12 | End: 2023-09-14 | Stop reason: HOSPADM

## 2023-09-12 RX ORDER — PROCHLORPERAZINE EDISYLATE 5 MG/ML
5 INJECTION INTRAMUSCULAR; INTRAVENOUS EVERY 6 HOURS PRN
Status: DISCONTINUED | OUTPATIENT
Start: 2023-09-12 | End: 2023-09-14 | Stop reason: HOSPADM

## 2023-09-12 RX ORDER — LINEZOLID 600 MG/1
600 TABLET, FILM COATED ORAL EVERY 12 HOURS SCHEDULED
Status: DISCONTINUED | OUTPATIENT
Start: 2023-09-12 | End: 2023-09-14 | Stop reason: HOSPADM

## 2023-09-12 RX ORDER — SODIUM CHLORIDE 0.9 % (FLUSH) 0.9 %
5-40 SYRINGE (ML) INJECTION EVERY 12 HOURS SCHEDULED
Status: DISCONTINUED | OUTPATIENT
Start: 2023-09-12 | End: 2023-09-14 | Stop reason: HOSPADM

## 2023-09-12 RX ORDER — DEXTROSE MONOHYDRATE 100 MG/ML
INJECTION, SOLUTION INTRAVENOUS CONTINUOUS PRN
Status: DISCONTINUED | OUTPATIENT
Start: 2023-09-12 | End: 2023-09-14 | Stop reason: HOSPADM

## 2023-09-12 RX ORDER — AMLODIPINE BESYLATE 5 MG/1
5 TABLET ORAL DAILY
Status: DISCONTINUED | OUTPATIENT
Start: 2023-09-12 | End: 2023-09-14 | Stop reason: HOSPADM

## 2023-09-12 RX ORDER — TRAMADOL HYDROCHLORIDE 50 MG/1
25 TABLET ORAL EVERY 8 HOURS PRN
Status: DISCONTINUED | OUTPATIENT
Start: 2023-09-12 | End: 2023-09-14 | Stop reason: HOSPADM

## 2023-09-12 RX ORDER — PANTOPRAZOLE SODIUM 40 MG/1
40 TABLET, DELAYED RELEASE ORAL DAILY
Status: DISCONTINUED | OUTPATIENT
Start: 2023-09-12 | End: 2023-09-14 | Stop reason: HOSPADM

## 2023-09-12 RX ORDER — SODIUM CHLORIDE 9 MG/ML
INJECTION, SOLUTION INTRAVENOUS PRN
Status: DISCONTINUED | OUTPATIENT
Start: 2023-09-12 | End: 2023-09-14 | Stop reason: HOSPADM

## 2023-09-12 RX ORDER — PREDNISONE 20 MG/1
40 TABLET ORAL DAILY
Status: COMPLETED | OUTPATIENT
Start: 2023-09-12 | End: 2023-09-14

## 2023-09-12 RX ORDER — FINASTERIDE 5 MG/1
5 TABLET, FILM COATED ORAL DAILY
Status: DISCONTINUED | OUTPATIENT
Start: 2023-09-12 | End: 2023-09-14 | Stop reason: HOSPADM

## 2023-09-12 RX ORDER — INSULIN LISPRO 100 [IU]/ML
0-4 INJECTION, SOLUTION INTRAVENOUS; SUBCUTANEOUS NIGHTLY
Status: DISCONTINUED | OUTPATIENT
Start: 2023-09-12 | End: 2023-09-13

## 2023-09-12 RX ORDER — MULTIVITAMIN WITH IRON
1 TABLET ORAL DAILY
Status: DISCONTINUED | OUTPATIENT
Start: 2023-09-12 | End: 2023-09-14 | Stop reason: HOSPADM

## 2023-09-12 RX ORDER — ACETAMINOPHEN 650 MG/1
650 SUPPOSITORY RECTAL EVERY 6 HOURS PRN
Status: DISCONTINUED | OUTPATIENT
Start: 2023-09-12 | End: 2023-09-14 | Stop reason: HOSPADM

## 2023-09-12 RX ORDER — TAMSULOSIN HYDROCHLORIDE 0.4 MG/1
0.4 CAPSULE ORAL DAILY
Status: DISCONTINUED | OUTPATIENT
Start: 2023-09-12 | End: 2023-09-14 | Stop reason: HOSPADM

## 2023-09-12 RX ORDER — POLYETHYLENE GLYCOL 3350 17 G/17G
17 POWDER, FOR SOLUTION ORAL DAILY PRN
Status: DISCONTINUED | OUTPATIENT
Start: 2023-09-12 | End: 2023-09-14 | Stop reason: HOSPADM

## 2023-09-12 RX ORDER — INSULIN LISPRO 100 [IU]/ML
0-8 INJECTION, SOLUTION INTRAVENOUS; SUBCUTANEOUS
Status: DISCONTINUED | OUTPATIENT
Start: 2023-09-12 | End: 2023-09-13

## 2023-09-12 RX ORDER — ACETAMINOPHEN 325 MG/1
650 TABLET ORAL EVERY 6 HOURS PRN
Status: DISCONTINUED | OUTPATIENT
Start: 2023-09-12 | End: 2023-09-14 | Stop reason: HOSPADM

## 2023-09-12 RX ORDER — ALLOPURINOL 100 MG/1
200 TABLET ORAL DAILY
Status: DISCONTINUED | OUTPATIENT
Start: 2023-09-12 | End: 2023-09-14 | Stop reason: HOSPADM

## 2023-09-12 RX ORDER — SODIUM CHLORIDE 9 MG/ML
INJECTION, SOLUTION INTRAVENOUS CONTINUOUS
Status: DISCONTINUED | OUTPATIENT
Start: 2023-09-12 | End: 2023-09-14 | Stop reason: HOSPADM

## 2023-09-12 RX ADMIN — MULTIVITAMIN TABLET 1 TABLET: TABLET at 09:26

## 2023-09-12 RX ADMIN — INSULIN LISPRO 4 UNITS: 100 INJECTION, SOLUTION INTRAVENOUS; SUBCUTANEOUS at 12:44

## 2023-09-12 RX ADMIN — APIXABAN 5 MG: 5 TABLET, FILM COATED ORAL at 08:47

## 2023-09-12 RX ADMIN — PREDNISONE 40 MG: 20 TABLET ORAL at 08:46

## 2023-09-12 RX ADMIN — APIXABAN 5 MG: 5 TABLET, FILM COATED ORAL at 21:02

## 2023-09-12 RX ADMIN — AMPICILLIN SODIUM AND SULBACTAM SODIUM 3000 MG: 2; 1 INJECTION, POWDER, FOR SOLUTION INTRAMUSCULAR; INTRAVENOUS at 21:09

## 2023-09-12 RX ADMIN — LINEZOLID 600 MG: 600 TABLET, FILM COATED ORAL at 21:02

## 2023-09-12 RX ADMIN — PANTOPRAZOLE SODIUM 40 MG: 40 TABLET, DELAYED RELEASE ORAL at 08:45

## 2023-09-12 RX ADMIN — SODIUM CHLORIDE: 9 INJECTION, SOLUTION INTRAVENOUS at 16:48

## 2023-09-12 RX ADMIN — SODIUM CHLORIDE, PRESERVATIVE FREE 10 ML: 5 INJECTION INTRAVENOUS at 21:04

## 2023-09-12 RX ADMIN — TRAMADOL HYDROCHLORIDE 25 MG: 50 TABLET ORAL at 01:15

## 2023-09-12 RX ADMIN — SODIUM CHLORIDE: 9 INJECTION, SOLUTION INTRAVENOUS at 01:19

## 2023-09-12 RX ADMIN — AMPICILLIN SODIUM AND SULBACTAM SODIUM 3000 MG: 2; 1 INJECTION, POWDER, FOR SOLUTION INTRAMUSCULAR; INTRAVENOUS at 12:53

## 2023-09-12 RX ADMIN — LINEZOLID 600 MG: 600 TABLET, FILM COATED ORAL at 12:58

## 2023-09-12 RX ADMIN — AMPICILLIN SODIUM AND SULBACTAM SODIUM 3000 MG: 2; 1 INJECTION, POWDER, FOR SOLUTION INTRAMUSCULAR; INTRAVENOUS at 01:22

## 2023-09-12 RX ADMIN — ALLOPURINOL 200 MG: 100 TABLET ORAL at 08:46

## 2023-09-12 RX ADMIN — INSULIN LISPRO 4 UNITS: 100 INJECTION, SOLUTION INTRAVENOUS; SUBCUTANEOUS at 01:16

## 2023-09-12 RX ADMIN — INSULIN LISPRO 8 UNITS: 100 INJECTION, SOLUTION INTRAVENOUS; SUBCUTANEOUS at 18:05

## 2023-09-12 RX ADMIN — TRAMADOL HYDROCHLORIDE 25 MG: 50 TABLET ORAL at 09:01

## 2023-09-12 RX ADMIN — AMPICILLIN SODIUM AND SULBACTAM SODIUM 3000 MG: 2; 1 INJECTION, POWDER, FOR SOLUTION INTRAMUSCULAR; INTRAVENOUS at 06:19

## 2023-09-12 RX ADMIN — INSULIN LISPRO 7 UNITS: 100 INJECTION, SOLUTION INTRAVENOUS; SUBCUTANEOUS at 18:06

## 2023-09-12 RX ADMIN — FINASTERIDE 5 MG: 5 TABLET, FILM COATED ORAL at 08:47

## 2023-09-12 RX ADMIN — TAMSULOSIN HYDROCHLORIDE 0.4 MG: 0.4 CAPSULE ORAL at 08:46

## 2023-09-12 RX ADMIN — INSULIN LISPRO 4 UNITS: 100 INJECTION, SOLUTION INTRAVENOUS; SUBCUTANEOUS at 21:02

## 2023-09-12 RX ADMIN — AMLODIPINE BESYLATE 5 MG: 5 TABLET ORAL at 08:46

## 2023-09-12 ASSESSMENT — PAIN DESCRIPTION - ORIENTATION: ORIENTATION: RIGHT;LEFT

## 2023-09-12 ASSESSMENT — PAIN DESCRIPTION - LOCATION
LOCATION: FACE

## 2023-09-12 ASSESSMENT — PAIN - FUNCTIONAL ASSESSMENT: PAIN_FUNCTIONAL_ASSESSMENT: PREVENTS OR INTERFERES SOME ACTIVE ACTIVITIES AND ADLS

## 2023-09-12 ASSESSMENT — PAIN SCALES - GENERAL
PAINLEVEL_OUTOF10: 6

## 2023-09-12 ASSESSMENT — PAIN DESCRIPTION - DESCRIPTORS: DESCRIPTORS: ACHING;BURNING

## 2023-09-12 NOTE — PATIENT CARE CONFERENCE
P Quality Flow/Interdisciplinary Rounds Progress Note        Quality Flow Rounds held on September 12, 2023    Disciplines Attending:  Bedside Nurse, , , and Nursing Unit Leadership    Madison Pulido was admitted on 9/11/2023  9:36 PM    Anticipated Discharge Date:       Disposition:    Ronak Score:  Ronak Scale Score: 20    Readmission Risk              Risk of Unplanned Readmission:  20           Discussed patient goal for the day, patient clinical progression, and barriers to discharge.   The following Goal(s) of the Day/Commitment(s) have been identified:  Diagnostics - Report Results and Labs - Report Results      Lovely Penn RN  September 12, 2023

## 2023-09-12 NOTE — PLAN OF CARE
Problem: Chronic Conditions and Co-morbidities  Goal: Patient's chronic conditions and co-morbidity symptoms are monitored and maintained or improved  9/12/2023 1553 by John Jackson RN  Outcome: Progressing  9/12/2023 0411 by Ruth Lindsay RN  Outcome: Progressing     Problem: Discharge Planning  Goal: Discharge to home or other facility with appropriate resources  9/12/2023 1553 by John Jackson RN  Outcome: Progressing  9/12/2023 0411 by Ruth Lindsay RN  Outcome: Progressing     Problem: Pain  Goal: Verbalizes/displays adequate comfort level or baseline comfort level  9/12/2023 1553 by John Jackson RN  Outcome: Progressing  9/12/2023 0411 by Ruth Lindsay RN  Outcome: Progressing     Problem: ABCDS Injury Assessment  Goal: Absence of physical injury  9/12/2023 1553 by John Jackson RN  Outcome: Progressing  9/12/2023 0411 by Ruth Lindsay RN  Outcome: Progressing     Problem: Safety - Adult  Goal: Free from fall injury  9/12/2023 1553 by John Jackson RN  Outcome: Progressing  9/12/2023 0411 by Ruth Lindsay RN  Outcome: Progressing

## 2023-09-12 NOTE — H&P
Hospital Medicine History & Physical      PCP: Perri Black MD    Date of Admission: 9/11/2023    Date of Service: . SEPT 12, 2023    Chief Complaint:    FACIAL PAIN      History Of Present Illness:     78 y.o. male presented with FACIAL PAIN. WIFE STATES HE WAS HAVING FACIAL PAIN, WENT TO PMD TOLD HE HAD SINUSITIS AND GIVEN ABX. 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 Marlene Delcid    COLONOSCOPY  03/14/2014    ENDOSCOPY, COLON, DIAGNOSTIC      HERNIA REPAIR  01/12/2011    KNEE ARTHROPLASTY  02/15/2010    KNEE SURGERY Left     PACEMAKER INSERTION  05/31/2023    [employer] Dual PPM  Dr. Marlene Delcid    POLYPECTOMY  04/28/2011    UPPER GASTROINTESTINAL ENDOSCOPY  04/28/2011       Medications Prior to Admission:      Prior to Admission medications    Medication Sig Start Date End Date Taking?  Authorizing Provider   neomycin-polymyxin-hydrocortisone (CORTISPORIN) [id number] otic suspension INSTILL 4 DROPS INTO AFFECTED EAR(S) BY OTIC ROUTE 3 TIMES PER DAY 5/3/23  Yes

## 2023-09-12 NOTE — CONSULTS
37.7 09/12/2023 04:38 AM    MCV 99.5 09/12/2023 04:38 AM    MCH 32.7 09/12/2023 04:38 AM    MCHC 32.9 09/12/2023 04:38 AM    RDW 13.5 09/12/2023 04:38 AM     09/12/2023 04:38 AM    MPV 11.4 09/12/2023 04:38 AM     CMP:    Lab Results   Component Value Date/Time     09/12/2023 04:38 AM    K 4.1 09/12/2023 04:38 AM    K 3.9 02/20/2023 05:48 AM    CL 98 09/12/2023 04:38 AM    CO2 20 09/12/2023 04:38 AM    BUN 29 09/12/2023 04:38 AM    CREATININE 1.5 09/12/2023 04:38 AM    GFRAA >60 11/03/2015 01:05 AM    LABGLOM 49 09/12/2023 04:38 AM    GLUCOSE 264 09/12/2023 04:38 AM    PROT 7.7 09/11/2023 10:30 AM    LABALBU 4.1 09/11/2023 10:30 AM    CALCIUM 9.1 09/12/2023 04:38 AM    BILITOT 0.7 09/11/2023 10:30 AM    ALKPHOS 63 09/11/2023 10:30 AM    AST 15 09/11/2023 10:30 AM    ALT 18 09/11/2023 10:30 AM          General: NAD, resting comfortably in chair at this time  H: Normocephalic, atraumatic, no contusion or laceration, denies paresthesia  E: EOMI, PEERL, vision intact OU  E: EAC clear, no otorrhea  N: bilateral nasal septal swelling, nasal erythema up the nasal dorsum and extending bilaterally onto the malar regions  M/T: Oral and pharyngeal mucosa pink and moist, occlusion stable, SAUD approximately 35mm. Retained root tooth #7, non tender with no edema or erythema associated with it. No dental or gingival pain  Neck: Nontender, no masses, trachea midline    Radiology:   - Reviewed, no odontogenic source identified, no periapical radiolucencies noted. - Soft tissue swelling of the nasal/premaxillary regions, likely related to  cellulitis. - 2.4 x 0.9 x 2.7 cm developing abscess along the buccal surface of the  superior premaxillary region extending to the inferior nasal region involving  the inferior aspect of the nasal septum. Assessment/Plan: 77 yo m with nasal/facial cellulitis.     - No identifiable odontogenic source  - No identifiable oral source  - Recommend ENT evaluation given history of recurrent sinus disease and obvious nasal septum edema and erythema  - Recommend follow up with general dentist for further radiographic evaluation of tooth #7  - No OMFS intervention at this time, OMFS signing off      Joesph Glaser DMD  Oral & Maxillofacial Surgery  9/12/2023  6:12 PM

## 2023-09-12 NOTE — PROGRESS NOTES
Physical Therapy  Physical Therapy Initial Assessment     Name: Jes Larios  : 1945  MRN: [id number]      Date of Service: 2023    Evaluating PT:  Samuel Quintana PT, DPT [id number]    Room #:  3341/4848-M  Diagnosis:  Facial cellulitis [J32.403]  PMHx/PSHx:    Past Medical History:   Diagnosis Date    Arthritis     Atrial fibrillation (HCC)     Alonzo's esophagus     BPH (benign prostatic hypertrophy)     Diverticulosis     Gastritis     GERD (gastroesophageal reflux disease)     H/O non-insulin dependent diabetes mellitus     Hemorrhoids, internal     Hernia, hiatal     HTN (hypertension)     Hyperlipidemia     Hypertension     Hypoxemia requiring supplemental oxygen     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 Bed is on 1st floor and bath is on 1st floor. Pt ambulated with no AD PTA. OBJECTIVE:   Initial Evaluation  Date: 23 Treatment Short Term/ Long Term   Goals   AM-PAC 6 Clicks 41/15     Was pt agreeable to Eval/treatment? yes     Does pt have pain? Facial pain     Bed Mobility  Rolling: SBA  Supine to sit: SBA  Sit to supine: NT  Scooting: SBA  Rolling: Independent   Supine to sit: Independent   Sit to supine: Independent   Scooting: Independent    Transfers Sit to stand: SBA  Stand to sit: SBA  Stand pivot: Tracy with no AD, SBA with [facility]  Sit to stand: Independent   Stand to sit: Independent   Stand pivot:  Mod I with [facility]   Ambulation    15 feet x2 with no AD Tracy  100 feet with [facility] SBA  >200 feet with [facility] Mod I   Stair negotiation: ascended and descended  NT  3 steps with 1 rail Mod I    ROM BUE:

## 2023-09-12 NOTE — CARE COORDINATION
9/12:  Transition of care:  Pt presented to the ER for sinus infection & facial cellulitis from home. Pt is on room air at 95%, Iv Unasyn til 918, & Eliquis. ID was consulted. Cm spoke with the pt's wife Benita Vargas at [phone] to discuss CM role & dc planning. Pt's PCP is Dr Tim Jessica. Pt lives with his spouse in a 1 story home with 3 steps to enter. PTA pt was independent with only a glucometer. Pt has a hx of HHC with [facility] but no SNF. Pt is active with OP PT/OT. Per wife pt dc plan is home & she can transport. CM advise will reach back out once PT/OT  sees & ID recommendations to discuss further. She choice [facility] & declined any list.  If not available she has no preferences. Sw/CM will continue to follow. Electronically signed by Lorenza Cruz RN on 9/12/2023 at 10:42 AM    The Plan for Transition of Care is related to the following treatment goals: [address]    The Patient and/or patient representative  was provided with a choice of provider and agrees   with the discharge plan. [x] Yes [] No    Freedom of choice list was provided with basic dialogue that supports the patient's individualized plan of care/goals, treatment preferences and shares the quality data associated with the providers.  [x] Yes [] No

## 2023-09-12 NOTE — ED NOTES
Rounding completed on patient. Patients wife states she medicated patient with tylenol she had in car because she believed he had temp.  Temp recorded at this time 100.0     Rosalina Van LPN  38/80/27 0953

## 2023-09-13 LAB
ANION GAP SERPL CALCULATED.3IONS-SCNC: 12 MMOL/L (ref 7–16)
BUN SERPL-MCNC: 25 MG/DL (ref 6–23)
CALCIUM SERPL-MCNC: 8.8 MG/DL (ref 8.6–10.2)
CHLORIDE SERPL-SCNC: 102 MMOL/L (ref 98–107)
CO2 SERPL-SCNC: 22 MMOL/L (ref 22–29)
CREAT SERPL-MCNC: 1.3 MG/DL (ref 0.7–1.2)
GFR SERPL CREATININE-BSD FRML MDRD: 56 ML/MIN/1.73M2
GLUCOSE BLD-MCNC: 215 MG/DL (ref 74–99)
GLUCOSE BLD-MCNC: 387 MG/DL (ref 74–99)
GLUCOSE BLD-MCNC: 401 MG/DL (ref 74–99)
GLUCOSE BLD-MCNC: 407 MG/DL (ref 74–99)
GLUCOSE SERPL-MCNC: 221 MG/DL (ref 74–99)
POTASSIUM SERPL-SCNC: 4.1 MMOL/L (ref 3.5–5)
SODIUM SERPL-SCNC: 136 MMOL/L (ref 132–146)

## 2023-09-13 PROCEDURE — 80048 BASIC METABOLIC PNL TOTAL CA: CPT

## 2023-09-13 PROCEDURE — 6370000000 HC RX 637 (ALT 250 FOR IP): Performed by: OTOLARYNGOLOGY

## 2023-09-13 PROCEDURE — 6370000000 HC RX 637 (ALT 250 FOR IP): Performed by: INTERNAL MEDICINE

## 2023-09-13 PROCEDURE — 36415 COLL VENOUS BLD VENIPUNCTURE: CPT

## 2023-09-13 PROCEDURE — 6370000000 HC RX 637 (ALT 250 FOR IP): Performed by: NURSE PRACTITIONER

## 2023-09-13 PROCEDURE — 97165 OT EVAL LOW COMPLEX 30 MIN: CPT

## 2023-09-13 PROCEDURE — 97535 SELF CARE MNGMENT TRAINING: CPT

## 2023-09-13 PROCEDURE — 82962 GLUCOSE BLOOD TEST: CPT

## 2023-09-13 PROCEDURE — 2140000000 HC CCU INTERMEDIATE R&B

## 2023-09-13 PROCEDURE — 2580000003 HC RX 258: Performed by: NURSE PRACTITIONER

## 2023-09-13 PROCEDURE — 6360000002 HC RX W HCPCS: Performed by: NURSE PRACTITIONER

## 2023-09-13 PROCEDURE — 97530 THERAPEUTIC ACTIVITIES: CPT

## 2023-09-13 RX ORDER — INSULIN LISPRO 100 [IU]/ML
0-4 INJECTION, SOLUTION INTRAVENOUS; SUBCUTANEOUS NIGHTLY
Status: DISCONTINUED | OUTPATIENT
Start: 2023-09-13 | End: 2023-09-14 | Stop reason: HOSPADM

## 2023-09-13 RX ORDER — INSULIN LISPRO 100 [IU]/ML
2 INJECTION, SOLUTION INTRAVENOUS; SUBCUTANEOUS ONCE
Status: COMPLETED | OUTPATIENT
Start: 2023-09-13 | End: 2023-09-13

## 2023-09-13 RX ORDER — INSULIN LISPRO 100 [IU]/ML
0-16 INJECTION, SOLUTION INTRAVENOUS; SUBCUTANEOUS
Status: DISCONTINUED | OUTPATIENT
Start: 2023-09-14 | End: 2023-09-14 | Stop reason: HOSPADM

## 2023-09-13 RX ADMIN — AMPICILLIN SODIUM AND SULBACTAM SODIUM 3000 MG: 2; 1 INJECTION, POWDER, FOR SOLUTION INTRAMUSCULAR; INTRAVENOUS at 01:13

## 2023-09-13 RX ADMIN — TAMSULOSIN HYDROCHLORIDE 0.4 MG: 0.4 CAPSULE ORAL at 09:44

## 2023-09-13 RX ADMIN — INSULIN LISPRO 8 UNITS: 100 INJECTION, SOLUTION INTRAVENOUS; SUBCUTANEOUS at 13:32

## 2023-09-13 RX ADMIN — APIXABAN 5 MG: 5 TABLET, FILM COATED ORAL at 09:43

## 2023-09-13 RX ADMIN — LINEZOLID 600 MG: 600 TABLET, FILM COATED ORAL at 22:07

## 2023-09-13 RX ADMIN — APIXABAN 5 MG: 5 TABLET, FILM COATED ORAL at 22:07

## 2023-09-13 RX ADMIN — AMPICILLIN SODIUM AND SULBACTAM SODIUM 3000 MG: 2; 1 INJECTION, POWDER, FOR SOLUTION INTRAMUSCULAR; INTRAVENOUS at 13:39

## 2023-09-13 RX ADMIN — PREDNISONE 40 MG: 20 TABLET ORAL at 09:44

## 2023-09-13 RX ADMIN — AMLODIPINE BESYLATE 5 MG: 5 TABLET ORAL at 09:43

## 2023-09-13 RX ADMIN — INSULIN LISPRO 4 UNITS: 100 INJECTION, SOLUTION INTRAVENOUS; SUBCUTANEOUS at 22:07

## 2023-09-13 RX ADMIN — TRAMADOL HYDROCHLORIDE 25 MG: 50 TABLET ORAL at 01:07

## 2023-09-13 RX ADMIN — LINEZOLID 600 MG: 600 TABLET, FILM COATED ORAL at 09:43

## 2023-09-13 RX ADMIN — SODIUM CHLORIDE, PRESERVATIVE FREE 10 ML: 5 INJECTION INTRAVENOUS at 22:06

## 2023-09-13 RX ADMIN — SODIUM CHLORIDE: 9 INJECTION, SOLUTION INTRAVENOUS at 18:58

## 2023-09-13 RX ADMIN — INSULIN HUMAN 5 UNITS: 100 INJECTION, SOLUTION PARENTERAL at 19:04

## 2023-09-13 RX ADMIN — AMPICILLIN SODIUM AND SULBACTAM SODIUM 3000 MG: 2; 1 INJECTION, POWDER, FOR SOLUTION INTRAMUSCULAR; INTRAVENOUS at 22:12

## 2023-09-13 RX ADMIN — ALLOPURINOL 200 MG: 100 TABLET ORAL at 09:45

## 2023-09-13 RX ADMIN — FINASTERIDE 5 MG: 5 TABLET, FILM COATED ORAL at 09:43

## 2023-09-13 RX ADMIN — MULTIVITAMIN TABLET 1 TABLET: TABLET at 09:44

## 2023-09-13 RX ADMIN — SALINE NASAL SPRAY 2 SPRAY: 1.5 SOLUTION NASAL at 22:14

## 2023-09-13 RX ADMIN — AMPICILLIN SODIUM AND SULBACTAM SODIUM 3000 MG: 2; 1 INJECTION, POWDER, FOR SOLUTION INTRAMUSCULAR; INTRAVENOUS at 06:36

## 2023-09-13 RX ADMIN — INSULIN LISPRO 2 UNITS: 100 INJECTION, SOLUTION INTRAVENOUS; SUBCUTANEOUS at 09:42

## 2023-09-13 RX ADMIN — INSULIN LISPRO 2 UNITS: 100 INJECTION, SOLUTION INTRAVENOUS; SUBCUTANEOUS at 22:08

## 2023-09-13 RX ADMIN — PANTOPRAZOLE SODIUM 40 MG: 40 TABLET, DELAYED RELEASE ORAL at 09:44

## 2023-09-13 RX ADMIN — MUPIROCIN: 20 OINTMENT TOPICAL at 22:14

## 2023-09-13 ASSESSMENT — PAIN SCALES - GENERAL: PAINLEVEL_OUTOF10: 6

## 2023-09-13 ASSESSMENT — PAIN DESCRIPTION - LOCATION: LOCATION: FACE

## 2023-09-13 NOTE — CARE COORDINATION
9/13:  Update CM Note:  Pt presented to the ER for sinus infection & facial cellulitis from home. Pt is on room air at 95%, Iv Unasyn til 9/18, PO Zyvox & Eliquis. ID is following. ENT consulted. PT 17/24 OT 20/24. Cm spoke with the pt's wife Lopez Hernandez at [phone] to discuss CM role & dc planning. Pt's dc plan is home & she can transport. CM advise will reach back out once clarify ID recommendations to discuss further. She choice [facility] & declined any list.  Cm sent referral to [facility] & they are following. Will need Zyvox script to see if will need pre-cert. Sw/CM will continue to follow. Electronically signed by Maddison Cummings RN on 9/13/2023 at 1:59 PM      The Plan for Transition of Care is related to the following treatment goals: [address]    The Patient and/or patient representative  was provided with a choice of provider and agrees   with the discharge plan. [x] Yes [] No    Freedom of choice list was provided with basic dialogue that supports the patient's individualized plan of care/goals, treatment preferences and shares the quality data associated with the providers.  [x] Yes [] No

## 2023-09-13 NOTE — PATIENT CARE CONFERENCE
[facility] Quality Flow/Interdisciplinary Rounds Progress Note        Quality Flow Rounds held on September 13, 2023    Disciplines Attending:  Bedside Nurse, , , and Nursing Unit Leadership    Jimenez Dooley was admitted on 9/11/2023  9:36 PM    Anticipated Discharge Date:  Expected Discharge Date: 09/14/23    Disposition:    Ronak Score:  Ronak Scale Score: 20    Readmission Risk              Risk of Unplanned Readmission:  20           Discussed patient goal for the day, patient clinical progression, and barriers to discharge.   The following Goal(s) of the Day/Commitment(s) have been identified:  Diagnostics - Report Results and Labs - Report Results      Ethan Wolfe RN  September 13, 2023

## 2023-09-13 NOTE — PROGRESS NOTES
Occupational Therapy  OCCUPATIONAL THERAPY INITIAL EVALUATION    MARKUS Romina  [facility]   [address] [place], [place]      [id number]                                                Patient Name: Colette Al  MRN: [id number]  : 1945  Room: [address] [place] [address]    Evaluating OT: Ibrahima Maria OTR/L #[id number]     Referring Provider: Hola Almaguer DO  Specific Provider Orders/Date: OT eval and treat 23    Diagnosis: Facial cellulitis [W16.325]   Pt admitted to hospital with Facial cellulitis / abscess     Pertinent Medical History:  has a past medical history of Arthritis, Atrial fibrillation ([address]), Alonzo's esophagus, BPH (benign prostatic hypertrophy), Diverticulosis, Gastritis, GERD (gastroesophageal reflux disease), H/O non-insulin dependent diabetes mellitus, Hemorrhoids, internal, Hernia, hiatal, HTN (hypertension), Hyperlipidemia, Hypertension, Hypoxemia requiring supplemental oxygen, 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        Precautions:  Fall Risk    Assessment of current deficits    [x] Functional mobility  [x]ADLs  [x] Strength               []Cognition    [x] Functional transfers   [x] IADLs         [x] Safety Awareness   [x]Endurance    [] Fine Coordination              [x] Balance      [] Vision/perception   []Sensation     []Gross Motor Coordination  [] ROM  [] Delirium                   [] Motor Control     OT PLAN OF CARE   OT POC based on physician orders, patient diagnosis and results of clinical assessment    Frequency/Duration 1-3 days/wk for 2 weeks PRN   Specific OT Treatment Interventions to include:   * Instruction/training on adapted ADL techniques and AE recommendations to increase functional independence within precautions       * Training on energy conservation prognosis/goals and OT plan of care. Demonstrated fair understanding. Eval Complexity: Low    Time In: 910  Time Out: 950  Total Treatment Time: 25 minutes    Min Units   OT Eval Low 97165  x  1   OT Eval Medium 93415      OT Eval High 25097      OT Re-Eval [id number]       Therapeutic Ex 70997       Therapeutic Activities 19041  16  1   ADL/Self Care 47328  9  1   Orthotic Management 58742       Manual 12685     Neuro Re-Ed 98051       Non-Billable Time          Evaluation Time additionally includes thorough review of current medical information, gathering information on past medical history/social history and prior level of function, interpretation of standardized testing/informal observation of tasks, assessment of data and development of plan of care and goals.           Maria Esther Ny OTR/L #[id number]

## 2023-09-13 NOTE — PROGRESS NOTES
Department of Internal Medicine  Infectious Diseases  Progress  Note      C/C :   Facial cellulitis / abscess     Denies fever or chills  Facial swelling,   Pain and swelling upper lip ( mid )   Afebrile       Current Facility-Administered Medications   Medication Dose Route Frequency Provider Last Rate Last Admin    sodium chloride flush 0.9 % injection 5-40 mL  5-40 mL IntraVENous 2 times per day April Diana, APRN - CNP   10 mL at 09/12/23 2104    sodium chloride flush 0.9 % injection 5-40 mL  5-40 mL IntraVENous PRN April Diana, APRN - CNP        0.9 % sodium chloride infusion   IntraVENous PRN April Diana, APRN - CNP        polyethylene glycol (GLYCOLAX) packet 17 g  17 g Oral Daily PRN April Diana, APRN - CNP        acetaminophen (TYLENOL) tablet 650 mg  650 mg Oral Q6H PRN April Diana, APRN - CNP        Or    acetaminophen (TYLENOL) suppository 650 mg  650 mg Rectal Q6H PRN April Diana, APRN - CNP        prochlorperazine (COMPAZINE) injection 5 mg  5 mg IntraVENous Q6H PRN April Diana, APRN - CNP        glucose chewable tablet 16 g  4 tablet Oral PRN April Diana, APRN - CNP        dextrose bolus 10% 125 mL  125 mL IntraVENous PRN April [employer], APRN - CNP        Or    dextrose bolus 10% 250 mL  250 mL IntraVENous PRN April Diana, APRN - CNP        glucagon injection 1 mg  1 mg SubCUTAneous PRN April Diana, APRN - CNP        dextrose 10 % infusion   IntraVENous Continuous PRN April Diana, APRN - CNP        0.9 % sodium chloride infusion   IntraVENous Continuous April Diana, APRN - CNP 75 mL/hr at 09/12/23 1648 New Bag at 09/12/23 1648    ampicillin-sulbactam (UNASYN) 3,000 mg in sodium chloride 0.9 % 100 mL IVPB ([id number])  3,000 mg IntraVENous Q6H April Diana, APRN - CNP   Stopped at 09/13/23 0738    traMADol (ULTRAM) tablet 25 mg  25 mg Oral Q8H PRN April

## 2023-09-13 NOTE — CONSULTS
OTOLARYNGOLOGY  CONSULT NOTE  9/13/2023    Physician Consulted: Dr. Daniela Priest  Reason for Consult: sinusitis  Referring Physician: Dr. Vickie Herzog    TEODORA George is a 78 y.o. male who presents for evaluation of sinusitis. Patient reports 4 day history of progressively worsening facial swelling redness and pain over the base of the nose. Symptoms first started with sinus pressure and nasal congestion. Was given antibx that caused apparent sudden onset of worsening nasal lip and face swelling. He reports blowing and picking his nose for the past several days prior to onset of pain and swelling. Denies previous previous similar history. He does report hx of chronic nasal congestion in which he uses many over the counter nasal spray decongestants, vicks rubs, and claritin. He does state he has tendency to pick things. Denies purulent drainage. No sinus surgery in the past. + seasonal allergies.   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 Russ Schulte    COLONOSCOPY  03/14/2014    ENDOSCOPY, COLON, DIAGNOSTIC      HERNIA REPAIR  01/12/2011    KNEE ARTHROPLASTY  02/15/2010    KNEE SURGERY Left     PACEMAKER INSERTION  05/31/2023    [employer]

## 2023-09-14 VITALS
RESPIRATION RATE: 18 BRPM | HEART RATE: 70 BPM | BODY MASS INDEX: 31.97 KG/M2 | WEIGHT: 236 LBS | OXYGEN SATURATION: 98 % | SYSTOLIC BLOOD PRESSURE: 134 MMHG | TEMPERATURE: 97.8 F | DIASTOLIC BLOOD PRESSURE: 77 MMHG | HEIGHT: 72 IN

## 2023-09-14 LAB
ANION GAP SERPL CALCULATED.3IONS-SCNC: 14 MMOL/L (ref 7–16)
BUN SERPL-MCNC: 24 MG/DL (ref 6–23)
CALCIUM SERPL-MCNC: 8.8 MG/DL (ref 8.6–10.2)
CHLORIDE SERPL-SCNC: 101 MMOL/L (ref 98–107)
CO2 SERPL-SCNC: 21 MMOL/L (ref 22–29)
CREAT SERPL-MCNC: 1.3 MG/DL (ref 0.7–1.2)
GFR SERPL CREATININE-BSD FRML MDRD: 58 ML/MIN/1.73M2
GLUCOSE BLD-MCNC: 231 MG/DL (ref 74–99)
GLUCOSE BLD-MCNC: 271 MG/DL (ref 74–99)
GLUCOSE BLD-MCNC: 382 MG/DL (ref 74–99)
GLUCOSE SERPL-MCNC: 277 MG/DL (ref 74–99)
POTASSIUM SERPL-SCNC: 4.1 MMOL/L (ref 3.5–5)
SODIUM SERPL-SCNC: 136 MMOL/L (ref 132–146)

## 2023-09-14 PROCEDURE — 82962 GLUCOSE BLOOD TEST: CPT

## 2023-09-14 PROCEDURE — 2580000003 HC RX 258: Performed by: NURSE PRACTITIONER

## 2023-09-14 PROCEDURE — 6360000002 HC RX W HCPCS: Performed by: NURSE PRACTITIONER

## 2023-09-14 PROCEDURE — 6370000000 HC RX 637 (ALT 250 FOR IP): Performed by: NURSE PRACTITIONER

## 2023-09-14 PROCEDURE — 80048 BASIC METABOLIC PNL TOTAL CA: CPT

## 2023-09-14 PROCEDURE — 36415 COLL VENOUS BLD VENIPUNCTURE: CPT

## 2023-09-14 PROCEDURE — 6370000000 HC RX 637 (ALT 250 FOR IP): Performed by: INTERNAL MEDICINE

## 2023-09-14 RX ORDER — AMOXICILLIN AND CLAVULANATE POTASSIUM 875; 125 MG/1; MG/1
1 TABLET, FILM COATED ORAL 2 TIMES DAILY
Qty: 14 TABLET | Refills: 0 | Status: SHIPPED | OUTPATIENT
Start: 2023-09-14 | End: 2023-09-21

## 2023-09-14 RX ORDER — LINEZOLID 600 MG/1
600 TABLET, FILM COATED ORAL EVERY 12 HOURS SCHEDULED
Qty: 14 TABLET | Refills: 0 | OUTPATIENT
Start: 2023-09-14 | End: 2023-09-21

## 2023-09-14 RX ORDER — AMOXICILLIN AND CLAVULANATE POTASSIUM 875; 125 MG/1; MG/1
1 TABLET, FILM COATED ORAL EVERY 12 HOURS SCHEDULED
Status: DISCONTINUED | OUTPATIENT
Start: 2023-09-14 | End: 2023-09-14 | Stop reason: HOSPADM

## 2023-09-14 RX ORDER — AMOXICILLIN AND CLAVULANATE POTASSIUM 875; 125 MG/1; MG/1
1 TABLET, FILM COATED ORAL EVERY 12 HOURS SCHEDULED
Qty: 14 TABLET | Refills: 0 | OUTPATIENT
Start: 2023-09-14 | End: 2023-09-21

## 2023-09-14 RX ORDER — LINEZOLID 600 MG/1
600 TABLET, FILM COATED ORAL 2 TIMES DAILY
Qty: 14 TABLET | Refills: 0 | Status: SHIPPED | OUTPATIENT
Start: 2023-09-14 | End: 2023-09-21

## 2023-09-14 RX ADMIN — AMPICILLIN SODIUM AND SULBACTAM SODIUM 3000 MG: 2; 1 INJECTION, POWDER, FOR SOLUTION INTRAMUSCULAR; INTRAVENOUS at 12:42

## 2023-09-14 RX ADMIN — MUPIROCIN: 20 OINTMENT TOPICAL at 17:48

## 2023-09-14 RX ADMIN — PREDNISONE 40 MG: 20 TABLET ORAL at 09:29

## 2023-09-14 RX ADMIN — ALLOPURINOL 200 MG: 100 TABLET ORAL at 09:24

## 2023-09-14 RX ADMIN — TAMSULOSIN HYDROCHLORIDE 0.4 MG: 0.4 CAPSULE ORAL at 09:24

## 2023-09-14 RX ADMIN — FINASTERIDE 5 MG: 5 TABLET, FILM COATED ORAL at 09:24

## 2023-09-14 RX ADMIN — AMPICILLIN SODIUM AND SULBACTAM SODIUM 3000 MG: 2; 1 INJECTION, POWDER, FOR SOLUTION INTRAMUSCULAR; INTRAVENOUS at 06:43

## 2023-09-14 RX ADMIN — AMPICILLIN SODIUM AND SULBACTAM SODIUM 3000 MG: 2; 1 INJECTION, POWDER, FOR SOLUTION INTRAMUSCULAR; INTRAVENOUS at 02:31

## 2023-09-14 RX ADMIN — LINEZOLID 600 MG: 600 TABLET, FILM COATED ORAL at 09:24

## 2023-09-14 RX ADMIN — SALINE NASAL SPRAY 2 SPRAY: 1.5 SOLUTION NASAL at 09:30

## 2023-09-14 RX ADMIN — SALINE NASAL SPRAY 2 SPRAY: 1.5 SOLUTION NASAL at 02:28

## 2023-09-14 RX ADMIN — SALINE NASAL SPRAY 2 SPRAY: 1.5 SOLUTION NASAL at 12:41

## 2023-09-14 RX ADMIN — SALINE NASAL SPRAY 2 SPRAY: 1.5 SOLUTION NASAL at 06:40

## 2023-09-14 RX ADMIN — APIXABAN 5 MG: 5 TABLET, FILM COATED ORAL at 09:24

## 2023-09-14 RX ADMIN — MUPIROCIN: 20 OINTMENT TOPICAL at 12:41

## 2023-09-14 RX ADMIN — MUPIROCIN: 20 OINTMENT TOPICAL at 09:31

## 2023-09-14 RX ADMIN — MULTIVITAMIN TABLET 1 TABLET: TABLET at 09:24

## 2023-09-14 RX ADMIN — PANTOPRAZOLE SODIUM 40 MG: 40 TABLET, DELAYED RELEASE ORAL at 09:24

## 2023-09-14 RX ADMIN — ACETAMINOPHEN 650 MG: 325 TABLET ORAL at 06:40

## 2023-09-14 RX ADMIN — SALINE NASAL SPRAY 2 SPRAY: 1.5 SOLUTION NASAL at 17:47

## 2023-09-14 RX ADMIN — AMLODIPINE BESYLATE 5 MG: 5 TABLET ORAL at 09:24

## 2023-09-14 RX ADMIN — INSULIN LISPRO 4 UNITS: 100 INJECTION, SOLUTION INTRAVENOUS; SUBCUTANEOUS at 09:24

## 2023-09-14 RX ADMIN — SODIUM CHLORIDE, PRESERVATIVE FREE 10 ML: 5 INJECTION INTRAVENOUS at 09:24

## 2023-09-14 RX ADMIN — INSULIN LISPRO 8 UNITS: 100 INJECTION, SOLUTION INTRAVENOUS; SUBCUTANEOUS at 12:41

## 2023-09-14 RX ADMIN — INSULIN LISPRO 16 UNITS: 100 INJECTION, SOLUTION INTRAVENOUS; SUBCUTANEOUS at 17:48

## 2023-09-14 ASSESSMENT — PAIN SCALES - GENERAL: PAINLEVEL_OUTOF10: 3

## 2023-09-14 ASSESSMENT — PAIN DESCRIPTION - LOCATION: LOCATION: HEAD

## 2023-09-14 NOTE — CARE COORDINATION
9/14:  Update CM Note:  Pt presented to the ER for sinus infection & facial cellulitis from home. Pt is possible dc today. PT 17/24 OT 20/24. Pt's dc plan is home & she can transport. CM advise will reach back out once clarify ID recommendations to discuss further. She choice [facility] & declined any list.  Cm sent referral to [facility] & they are following. Will need Zyvox script to see if will need pre-cert. Sw/ZAIN will continue to follow.  Electronically signed by Garrison Reyes RN on 9/14/2023 at 1:04 PM

## 2023-09-14 NOTE — PROGRESS NOTES
Dr. Edd Bhatti notified via voicemail of ID recommendations and patient ok for d/c from service. Asked if patient can d/c from POV.

## 2023-09-14 NOTE — CARE COORDINATION
Attempted to complete PA for Zyvox via CoverMyMeds (KEY: Blanca Hurtado). However, system states 1-3 days for decision. Found coupon for [facility] for [facility] for $41.27-47.77 for 14 day supply. Provided coupon to Lorenzouziel Moneángel to give to patient.     James Veras, MSN, [address]   Cell: [phone]

## 2023-09-14 NOTE — DISCHARGE INSTRUCTIONS
-Use bactroban nasal spray rinse 2 times daily  -do not use any over the counter nose sprays  -do not manipulate, pick or pluck the inside of your nose  -ok to take Claritin daily   BACTROBAN NASAL SPRAY     Fill prescription for 22 grams of Bactroban ointment. Purchase a bottle of nasal saline ( Nasal, Ocean or generic)     In a coffee mug mix:  1/3 tube ointment in 44 or 45 ml bottle of saline  OR  2/3 tube ointment in 88 ml bottle of saline  Microwave 15 seconds  Stir and let solution cool  After the solution is cool, poor the liquid back into the empty saline bottle and shake well     Use 2 sprays in each nostril 2 times a day for 2 weeks.

## 2023-09-14 NOTE — PROGRESS NOTES
Dr. Honorio Caballero notified via message of ent recommendations and if patient can d/c from [place].

## 2023-09-14 NOTE — PROGRESS NOTES
Department of Internal Medicine  Infectious Diseases  Progress  Note      C/C :   Facial cellulitis / abscess     Denies fever or chills  Facial swelling,   Pain and swelling upper lip ( mid )   Afebrile       Current Facility-Administered Medications   Medication Dose Route Frequency Provider Last Rate Last Admin    sodium chloride (OCEAN, BABY AYR) 0.65 % nasal spray 2 spray  2 spray Each Nostril Q4H Kilyoel PINZON Betito, DO   2 spray at 09/14/23 1241    mupirocin (BACTROBAN) 2 % ointment   Topical 4x Daily [facility] JEROMY Betito, DO   Given at 09/14/23 1241    insulin lispro (HUMALOG) injection vial 0-16 Units  0-16 Units SubCUTAneous TID [facility] April MC OrtizN - CNP   8 Units at 09/14/23 1241    insulin lispro (HUMALOG) injection vial 0-4 Units  0-4 Units SubCUTAneous Nightly April Diana APRN - CNP   4 Units at 09/13/23 2207    sodium chloride flush 0.9 % injection 5-40 mL  5-40 mL IntraVENous 2 times per day April Diana APRN - CNP   10 mL at 09/14/23 1879    sodium chloride flush 0.9 % injection 5-40 mL  5-40 mL IntraVENous PRN April Diana APRN - CNP        0.9 % sodium chloride infusion   IntraVENous PRN April Diana, APRN - CNP        polyethylene glycol (GLYCOLAX) packet 17 g  17 g Oral Daily PRN April Jianus, APRN - CNP        acetaminophen (TYLENOL) tablet 650 mg  650 mg Oral Q6H PRN April Diana, APRN - CNP   650 mg at 09/14/23 6667    Or    acetaminophen (TYLENOL) suppository 650 mg  650 mg Rectal Q6H PRN April Diana, APRN - CNP        prochlorperazine (COMPAZINE) injection 5 mg  5 mg IntraVENous Q6H PRN April Diana, APRN - CNP        glucose chewable tablet 16 g  4 tablet Oral PRN April Diana, APRN - CNP        dextrose bolus 10% 125 mL  125 mL IntraVENous PRN April Diana, APRN - CNP        Or    dextrose bolus 10% 250 mL  250 mL IntraVENous PRN April Diana, APRN - HUMA        glucagon IMPRESSION:     Facial cellulitis   Abscess upper lip ( inferior to the nasal area ) - improving       RECOMMENDATIONS:     Stop unasyn  Oral agumentin 875 mg po q 12 hrs and Zyvox 600 mg po q 12 hrs for 1 week   OK to discharge from ID POV - pt to follow up with  ENT out pt  ( appreciated ENT consult )

## 2023-09-14 NOTE — PROGRESS NOTES
P Quality Flow/Interdisciplinary Rounds Progress Note        Quality Flow Rounds held on September 14, 2023    Disciplines Attending:  Bedside Nurse, , , and Nursing Unit Leadership    Stephy Raza was admitted on 9/11/2023  9:36 PM    Anticipated Discharge Date:  Expected Discharge Date: 09/15/23    Disposition:    Ronak Score:  Ronak Scale Score: 20    Readmission Risk              Risk of Unplanned Readmission:  21           Discussed patient goal for the day, patient clinical progression, and barriers to discharge.   The following Goal(s) of the Day/Commitment(s) have been identified:  Diagnostics - Report Results and Labs - Report Results      Juan Manuel Cox RN  September 14, 2023

## 2023-09-14 NOTE — CARE COORDINATION
Pt's zyvox needs preauthorized, sent into Covermymeds, per response approval can take up to 1-3 days. Good RX has reasonable copays, [facility] $39, [facility] $47. [facility] aid, they do not have in stock. Called [facility] they have 10 and will order the rest. Faxed scripts to [facility]. Pt and spouse agreeable for cost and will  meds once discharged home. Myriam Jones, MSW, LSW

## 2023-09-15 ENCOUNTER — TELEPHONE (OUTPATIENT)
Dept: ADMINISTRATIVE | Age: 78
End: 2023-09-15

## 2023-09-15 ENCOUNTER — TELEPHONE (OUTPATIENT)
Dept: ENT CLINIC | Age: 78
End: 2023-09-15

## 2023-09-15 ENCOUNTER — APPOINTMENT (OUTPATIENT)
Dept: GENERAL RADIOLOGY | Age: 78
End: 2023-09-15
Payer: MEDICARE

## 2023-09-15 LAB
ANION GAP SERPL CALCULATED.3IONS-SCNC: 11 MMOL/L (ref 7–16)
BASOPHILS # BLD: 0.02 K/UL (ref 0–0.2)
BASOPHILS NFR BLD: 0 % (ref 0–2)
BUN SERPL-MCNC: 20 MG/DL (ref 6–23)
CALCIUM SERPL-MCNC: 8.6 MG/DL (ref 8.6–10.2)
CHLORIDE SERPL-SCNC: 98 MMOL/L (ref 98–107)
CO2 SERPL-SCNC: 24 MMOL/L (ref 22–29)
CREAT SERPL-MCNC: 1.3 MG/DL (ref 0.7–1.2)
EOSINOPHIL # BLD: 0.08 K/UL (ref 0.05–0.5)
EOSINOPHILS RELATIVE PERCENT: 1 % (ref 0–6)
ERYTHROCYTE [DISTWIDTH] IN BLOOD BY AUTOMATED COUNT: 13.2 % (ref 11.5–15)
GFR SERPL CREATININE-BSD FRML MDRD: 57 ML/MIN/1.73M2
GLUCOSE SERPL-MCNC: 253 MG/DL (ref 74–99)
HCT VFR BLD AUTO: 36.8 % (ref 37–54)
HGB BLD-MCNC: 12.4 G/DL (ref 12.5–16.5)
IMM GRANULOCYTES # BLD AUTO: 0.07 K/UL (ref 0–0.58)
IMM GRANULOCYTES NFR BLD: 1 % (ref 0–5)
LYMPHOCYTES NFR BLD: 1.44 K/UL (ref 1.5–4)
LYMPHOCYTES RELATIVE PERCENT: 14 % (ref 20–42)
MCH RBC QN AUTO: 33 PG (ref 26–35)
MCHC RBC AUTO-ENTMCNC: 33.7 G/DL (ref 32–34.5)
MCV RBC AUTO: 97.9 FL (ref 80–99.9)
MONOCYTES NFR BLD: 1.02 K/UL (ref 0.1–0.95)
MONOCYTES NFR BLD: 10 % (ref 2–12)
NEUTROPHILS NFR BLD: 74 % (ref 43–80)
NEUTS SEG NFR BLD: 7.36 K/UL (ref 1.8–7.3)
PLATELET # BLD AUTO: 220 K/UL (ref 130–450)
PMV BLD AUTO: 11.1 FL (ref 7–12)
POTASSIUM SERPL-SCNC: 4 MMOL/L (ref 3.5–5)
RBC # BLD AUTO: 3.76 M/UL (ref 3.8–5.8)
SARS-COV-2 RDRP RESP QL NAA+PROBE: NOT DETECTED
SODIUM SERPL-SCNC: 133 MMOL/L (ref 132–146)
SPECIMEN DESCRIPTION: NORMAL
WBC OTHER # BLD: 10 K/UL (ref 4.5–11.5)

## 2023-09-15 PROCEDURE — 85025 COMPLETE CBC W/AUTO DIFF WBC: CPT

## 2023-09-15 PROCEDURE — 80048 BASIC METABOLIC PNL TOTAL CA: CPT

## 2023-09-15 PROCEDURE — 71046 X-RAY EXAM CHEST 2 VIEWS: CPT

## 2023-09-15 PROCEDURE — 87635 SARS-COV-2 COVID-19 AMP PRB: CPT

## 2023-09-15 PROCEDURE — 99284 EMERGENCY DEPT VISIT MOD MDM: CPT

## 2023-09-15 NOTE — TELEPHONE ENCOUNTER
Pt needs an ER f/u (9/11/2023) with Dr Angela Villegas for a Facial/cellulitis/facial abscess. Wife states 2wks.   [phone]

## 2023-09-15 NOTE — TELEPHONE ENCOUNTER
Patient's wife called in stating pt was doing better after leaving the hospital yesterday but is now having difficulty breathing and worsening of swelling in the nose. Advised for patient to go back to the ED for f/u. Pt's wife understood.      Electronically signed by Poncoh Ramirez MA on 9/15/23 at 3:33 PM EDT

## 2023-09-15 NOTE — TELEPHONE ENCOUNTER
Lvm to schedule pt in 2 wks    Electronically signed by Corrinne Kettering, MA on 9/15/23 at 9:50 AM EDT

## 2023-09-15 NOTE — TELEPHONE ENCOUNTER
Spoke with patient's wife, scheduled 9/29 with Dr. Milvia Carrasquillo    Electronically signed by Silverio Brown MA on 9/15/23 at 2:17 PM EDT

## 2023-09-16 ENCOUNTER — HOSPITAL ENCOUNTER (EMERGENCY)
Age: 78
Discharge: HOME | End: 2023-09-16
Attending: EMERGENCY MEDICINE
Payer: MEDICARE

## 2023-09-16 VITALS
OXYGEN SATURATION: 94 % | HEART RATE: 76 BPM | TEMPERATURE: 99 F | SYSTOLIC BLOOD PRESSURE: 155 MMHG | DIASTOLIC BLOOD PRESSURE: 80 MMHG | RESPIRATION RATE: 18 BRPM

## 2023-09-16 DIAGNOSIS — R06.02 SHORTNESS OF BREATH: Primary | ICD-10-CM

## 2023-09-16 DIAGNOSIS — R09.81 NASAL CONGESTION: ICD-10-CM

## 2023-09-16 LAB
MICROORGANISM SPEC CULT: NORMAL
MICROORGANISM SPEC CULT: NORMAL
SERVICE CMNT-IMP: NORMAL
SERVICE CMNT-IMP: NORMAL
SPECIMEN DESCRIPTION: NORMAL
SPECIMEN DESCRIPTION: NORMAL

## 2023-09-16 PROCEDURE — 6370000000 HC RX 637 (ALT 250 FOR IP)

## 2023-09-16 RX ORDER — OXYMETAZOLINE HYDROCHLORIDE 0.05 G/100ML
2 SPRAY NASAL ONCE
Status: COMPLETED | OUTPATIENT
Start: 2023-09-16 | End: 2023-09-16

## 2023-09-16 RX ADMIN — Medication 2 SPRAY: at 01:59

## 2023-09-16 NOTE — DISCHARGE INSTRUCTIONS
Please continue to take antibiotics that were prescribed to you. Please follow-up with your PCP in about 1 week. Please return to the emergency department if symptoms worsen.

## 2023-09-16 NOTE — ED PROVIDER NOTES
[facility] ENCOUNTER        Pt Name: Morales Villasenor  MRN: [id number]  [address] [facility] 1945  Date of evaluation: 9/15/2023  Provider: Vero Pearson MD  PCP: Jaydon Shin MD  Note Started: 11:06 PM EDT 9/15/23    CHIEF COMPLAINT       Chief Complaint   Patient presents with    Shortness of Breath     Pt discharged today for cellulitis and is having increased SOB -CP        HISTORY OF PRESENT ILLNESS: 1 or more Elements   History From: patient    Limitations to history : None    Morales Villasenor is a 78 y.o. male who presents for shortness of breath. Patient was recently admitted and discharged last night for facial cellulitis. He is given IV antibiotics as well as oral antibiotics upon discharge. He states he did have the shortness of breath while he was in the hospital but it seems to worsen a little today. He states his nose appears congested with minimal nasal drainage. He has no known issues with his lungs such as COPD or asthma. Denies fever, chest pain, dizziness, headache, abdominal pain, nausea, vomiting. Nursing Notes were all reviewed and agreed with or any disagreements were addressed in the HPI. REVIEW OF EXTERNAL NOTE :       Patient was recently admitted and discharged yesterday on 9/11/2023 for facial cellulitis/facial abscess    REVIEW OF SYSTEMS :           Positives and Pertinent negatives as per HPI.      SURGICAL HISTORY     Past Surgical History:   Procedure Laterality Date    CARDIOVERSION  08/08/2023    Successful  Dr. Jignesh Navas    COLONOSCOPY  03/14/2014    ENDOSCOPY, COLON, DIAGNOSTIC      HERNIA REPAIR  01/12/2011    KNEE ARTHROPLASTY  02/15/2010    KNEE SURGERY Left     PACEMAKER INSERTION  05/31/2023    [employer] Dual PPM  Dr. Jignesh Navas    POLYPECTOMY  04/28/2011    UPPER GASTROINTESTINAL ENDOSCOPY  04/28/2011       CURRENTMEDICATIONS       Discharge Medication List as of 9/16/2023  2:11 AM 2:11 AM          DISCONTINUED MEDICATIONS:  Discharge Medication List as of 9/16/2023  2:11 AM                 (Please note that portions of this note were completed with a voice recognition program.  Efforts were made to edit the dictations but occasionally words are mis-transcribed.)    Martha Napoles MD (electronically signed)                Martha Napoles MD  Resident  09/16/23 2775

## 2023-09-18 ENCOUNTER — TELEPHONE (OUTPATIENT)
Dept: ADMINISTRATIVE | Age: 78
End: 2023-09-18

## 2023-09-18 NOTE — TELEPHONE ENCOUNTER
PCP DR Yuri Farr would like to see if pt could be seen sooner than 9/29 . Dr Yuri Farr would liseth be happy to Speak to Dr Jaspal Quintanilla. Infection in sinus/abcess Nose is  triple size . [phone] Gruber office phones are not working please call Number above.

## 2023-09-18 NOTE — TELEPHONE ENCOUNTER
MA spoke with Jessica Pink, scheduled patient 9/19 with Dr. Angela Villegas. She will let patient know.      Electronically signed by Fanny Guadalupe MA on 9/18/23 at 9:09 AM EDT

## 2023-09-19 ENCOUNTER — OFFICE VISIT (OUTPATIENT)
Dept: ENT CLINIC | Age: 78
End: 2023-09-19
Payer: MEDICARE

## 2023-09-19 ENCOUNTER — HOSPITAL ENCOUNTER (OUTPATIENT)
Dept: CT IMAGING | Age: 78
Discharge: HOME | End: 2023-09-21
Payer: MEDICARE

## 2023-09-19 ENCOUNTER — TELEPHONE (OUTPATIENT)
Dept: ENT CLINIC | Age: 78
End: 2023-09-19

## 2023-09-19 VITALS
DIASTOLIC BLOOD PRESSURE: 78 MMHG | OXYGEN SATURATION: 95 % | HEIGHT: 72 IN | WEIGHT: 236 LBS | BODY MASS INDEX: 31.97 KG/M2 | HEART RATE: 70 BPM | SYSTOLIC BLOOD PRESSURE: 148 MMHG

## 2023-09-19 DIAGNOSIS — J34.0 NASAL ABSCESS: ICD-10-CM

## 2023-09-19 DIAGNOSIS — J34.0 NASAL ABSCESS: Primary | ICD-10-CM

## 2023-09-19 PROCEDURE — 99204 OFFICE O/P NEW MOD 45 MIN: CPT | Performed by: OTOLARYNGOLOGY

## 2023-09-19 PROCEDURE — 1123F ACP DISCUSS/DSCN MKR DOCD: CPT | Performed by: OTOLARYNGOLOGY

## 2023-09-19 PROCEDURE — 1111F DSCHRG MED/CURRENT MED MERGE: CPT | Performed by: OTOLARYNGOLOGY

## 2023-09-19 PROCEDURE — 1036F TOBACCO NON-USER: CPT | Performed by: OTOLARYNGOLOGY

## 2023-09-19 PROCEDURE — G8417 CALC BMI ABV UP PARAM F/U: HCPCS | Performed by: OTOLARYNGOLOGY

## 2023-09-19 PROCEDURE — 70487 CT MAXILLOFACIAL W/DYE: CPT

## 2023-09-19 PROCEDURE — 3078F DIAST BP <80 MM HG: CPT | Performed by: OTOLARYNGOLOGY

## 2023-09-19 PROCEDURE — G8427 DOCREV CUR MEDS BY ELIG CLIN: HCPCS | Performed by: OTOLARYNGOLOGY

## 2023-09-19 PROCEDURE — 3077F SYST BP >= 140 MM HG: CPT | Performed by: OTOLARYNGOLOGY

## 2023-09-19 PROCEDURE — 6360000004 HC RX CONTRAST MEDICATION: Performed by: RADIOLOGY

## 2023-09-19 RX ADMIN — IOPAMIDOL 75 ML: 755 INJECTION, SOLUTION INTRAVENOUS at 15:21

## 2023-09-19 ASSESSMENT — ENCOUNTER SYMPTOMS
SINUS PAIN: 1
SINUS PRESSURE: 1
FACIAL SWELLING: 1

## 2023-09-19 NOTE — TELEPHONE ENCOUNTER
[facility] to authorize order with patient insurance. Patient is scheduled for STAT CT Facial Bone with radiology on 9/19/23 @ 3:00pm. Patient has been notified of date and time and that they need to arrive at 2:45pm. Patient was informed he needs to be NPO 3 hours prior to procedure. Patient instructed to park in [facility] parking lot and report to registration. Patient advised while still in-office and verbalized understanding.     Electronically signed by Stephen Mallory MA on 9/19/23 at 10:36 AM EDT

## 2023-09-19 NOTE — PROGRESS NOTES
Subjective:      Patient ID:  Lisa Terrazas is a 78 y.o. male. HPI:    Patient presents today for evaluation of facial swelling. ENT was consulted on him for recent admission see consult note below:     Lisa Terrazas is a 78 y.o. male who presents for evaluation of sinusitis. Patient reports 4 day history of progressively worsening facial swelling redness and pain over the base of the nose. Symptoms first started with sinus pressure and nasal congestion. Was given antibx that caused apparent sudden onset of worsening nasal lip and face swelling. He reports blowing and picking his nose for the past several days prior to onset of pain and swelling. Denies previous previous similar history. He does report hx of chronic nasal congestion in which he uses many over the counter nasal spray decongestants, vicks rubs, and claritin. He does state he has tendency to pick things. Denies purulent drainage. No sinus surgery in the past. + seasonal allergies. Since being on IV antibiotics patient reports much improvement in swelling and pain       Patient's medications, allergies, past medical, surgical, social and family histories were reviewed and updated as appropriate. Review of Systems   Constitutional:  Negative for chills and fever. HENT:  Positive for congestion, facial swelling, sinus pressure and sinus pain. All other systems reviewed and are negative. Objective:   Physical Exam  Constitutional:       General: He is not in acute distress. Appearance: Normal appearance. HENT:      Head: Normocephalic and atraumatic. Right Ear: Tympanic membrane and ear canal normal.      Left Ear: Tympanic membrane and ear canal normal.      Nose: Congestion present. Comments: Dry crusting bilateral nares  Caudal septum is swollen and mildly ttp     Mouth/Throat:      Mouth: Mucous membranes are dry. Pharynx: No posterior oropharyngeal erythema.    Eyes:      Extraocular Movements: Extraocular

## 2023-09-20 ENCOUNTER — TELEPHONE (OUTPATIENT)
Dept: ENT CLINIC | Age: 78
End: 2023-09-20

## 2023-09-20 NOTE — TELEPHONE ENCOUNTER
Patient wife called into the office and stated that after using the NeilMed kit, he was having burning in his nose. Patient wife states he did not use the packet, he only used distilled water and he is having intense burning. Clarified with patient that he only used distilled water, and discussed the use of the bactroban spray. Patient states he used the bactroban spray this morning and last night and did have some relief. Will speak with Dr. Sara May when he returns to the office this afternoon and return a call to the patient.   Electronically signed by Martina Goldstein LPN on 7/32/0401 at 6:70 AM

## 2023-09-21 NOTE — TELEPHONE ENCOUNTER
Dr Daniela Priest recommending patient to stop Neilmed rinse and continue Bactroban nasal spray until tomorrow. Wife notified.

## 2023-09-22 ENCOUNTER — TELEPHONE (OUTPATIENT)
Dept: ENT CLINIC | Age: 78
End: 2023-09-22

## 2023-09-22 ENCOUNTER — OFFICE VISIT (OUTPATIENT)
Dept: ENT CLINIC | Age: 78
End: 2023-09-22

## 2023-09-22 VITALS
DIASTOLIC BLOOD PRESSURE: 70 MMHG | TEMPERATURE: 97.7 F | HEART RATE: 70 BPM | SYSTOLIC BLOOD PRESSURE: 149 MMHG | WEIGHT: 236 LBS | OXYGEN SATURATION: 96 % | HEIGHT: 72 IN | RESPIRATION RATE: 12 BRPM | BODY MASS INDEX: 31.97 KG/M2

## 2023-09-22 DIAGNOSIS — J34.0 NASAL SEPTAL ABSCESS: Primary | ICD-10-CM

## 2023-09-22 DIAGNOSIS — J34.0 NASAL SEPTAL ABSCESS: ICD-10-CM

## 2023-09-22 ASSESSMENT — ENCOUNTER SYMPTOMS
SINUS PAIN: 1
SINUS PRESSURE: 1
FACIAL SWELLING: 1

## 2023-09-22 NOTE — TELEPHONE ENCOUNTER
Patient wife called into office with questions about her 's appointment. She wanted to know if he can start his Elliquis again and if he is still supposed to be using the bactroban spray. After consulting with the provider, I advised the patient that yes he can restart his Elliquis and he should continue to use the bactorban spray.   Electronically signed by Lianet Christianson LPN on 9/88/4551 at 7:33 PM

## 2023-09-22 NOTE — PROGRESS NOTES
Remainder of medical problems as per  resident note. Patient seen and examined. Agree with above exam, assessment and plan.       Electronically signed by Kaye Woodall DO on 9/22/23 at 11:40 AM EDT

## 2023-09-24 LAB
CULTURE: ABNORMAL
DIRECT EXAM: ABNORMAL
SPECIMEN DESCRIPTION: ABNORMAL

## 2023-09-26 ENCOUNTER — TELEPHONE (OUTPATIENT)
Dept: NON INVASIVE DIAGNOSTICS | Age: 78
End: 2023-09-26

## 2023-09-26 DIAGNOSIS — I48.0 PAF (PAROXYSMAL ATRIAL FIBRILLATION) (HCC): Primary | ICD-10-CM

## 2023-09-26 RX ORDER — AMIODARONE HYDROCHLORIDE 200 MG/1
200 TABLET ORAL 2 TIMES DAILY
COMMUNITY
End: 2023-09-27 | Stop reason: SDUPTHER

## 2023-09-26 NOTE — TELEPHONE ENCOUNTER
----- Message from Mounika Vasquez DO sent at 9/25/2023 10:13 PM EDT -----  Regarding: AF/amio, remote  AF recurrence on remote. Recommend:  1. START amiodarone 200 mg BID w/ food for 28 days, then 200 mg daily thereafter. 2. Remote pacemaker interrogation in 1 week.     Mounika Vasquez DO

## 2023-09-26 NOTE — TELEPHONE ENCOUNTER
Called and spoke with Mr. Corrinne Collier. Reviewed the most recent remote transmission with patient. Explained that he is having increase episodes of atrial fibrillation and reviewed Dr. Gracia Best recommendation. Patient states he is currently fighting a sinus infection but overall feels ok. Patient agrees to start on amiodarone. Plan:   1. START amiodarone 200 mg BID w/ food for 28 days, then 200 mg daily thereafter. 2. Remote pacemaker interrogation in 1 week. Scheduled for 10/5/2023.  ( This will give the patient time to start the medication.)        [address]

## 2023-09-27 RX ORDER — AMIODARONE HYDROCHLORIDE 200 MG/1
TABLET ORAL
Qty: 86 TABLET | Refills: 0 | Status: SHIPPED | OUTPATIENT
Start: 2023-09-27 | End: 2023-11-23

## 2023-09-29 ENCOUNTER — TELEPHONE (OUTPATIENT)
Dept: ENT CLINIC | Age: 78
End: 2023-09-29

## 2023-09-29 ENCOUNTER — OFFICE VISIT (OUTPATIENT)
Dept: ENT CLINIC | Age: 78
End: 2023-09-29
Payer: MEDICARE

## 2023-09-29 ENCOUNTER — HOSPITAL ENCOUNTER (OUTPATIENT)
Age: 78
Setting detail: OUTPATIENT SURGERY
Discharge: HOME | End: 2023-09-29
Attending: OTOLARYNGOLOGY | Admitting: OTOLARYNGOLOGY
Payer: MEDICARE

## 2023-09-29 ENCOUNTER — ANESTHESIA EVENT (OUTPATIENT)
Dept: OPERATING ROOM | Age: 78
End: 2023-09-29
Payer: MEDICARE

## 2023-09-29 ENCOUNTER — ANESTHESIA (OUTPATIENT)
Dept: OPERATING ROOM | Age: 78
End: 2023-09-29
Payer: MEDICARE

## 2023-09-29 VITALS
RESPIRATION RATE: 17 BRPM | SYSTOLIC BLOOD PRESSURE: 160 MMHG | DIASTOLIC BLOOD PRESSURE: 87 MMHG | OXYGEN SATURATION: 95 % | TEMPERATURE: 97 F | HEART RATE: 70 BPM

## 2023-09-29 VITALS
OXYGEN SATURATION: 97 % | BODY MASS INDEX: 31.97 KG/M2 | WEIGHT: 236 LBS | TEMPERATURE: 97.8 F | DIASTOLIC BLOOD PRESSURE: 77 MMHG | SYSTOLIC BLOOD PRESSURE: 140 MMHG | HEART RATE: 70 BPM | HEIGHT: 72 IN | RESPIRATION RATE: 12 BRPM

## 2023-09-29 DIAGNOSIS — G89.18 POST-OP PAIN: Primary | ICD-10-CM

## 2023-09-29 DIAGNOSIS — J34.89 NASAL OBSTRUCTION: ICD-10-CM

## 2023-09-29 DIAGNOSIS — J34.0 NASAL ABSCESS: ICD-10-CM

## 2023-09-29 DIAGNOSIS — J34.0 NASAL SEPTAL ABSCESS: Primary | ICD-10-CM

## 2023-09-29 LAB
GLUCOSE BLD-MCNC: 111 MG/DL (ref 74–99)
GLUCOSE BLD-MCNC: 141 MG/DL (ref 74–99)

## 2023-09-29 PROCEDURE — 6370000000 HC RX 637 (ALT 250 FOR IP)

## 2023-09-29 PROCEDURE — 3600000002 HC SURGERY LEVEL 2 BASE: Performed by: OTOLARYNGOLOGY

## 2023-09-29 PROCEDURE — 87176 TISSUE HOMOGENIZATION CULTR: CPT

## 2023-09-29 PROCEDURE — 3078F DIAST BP <80 MM HG: CPT | Performed by: OTOLARYNGOLOGY

## 2023-09-29 PROCEDURE — G8417 CALC BMI ABV UP PARAM F/U: HCPCS | Performed by: OTOLARYNGOLOGY

## 2023-09-29 PROCEDURE — 87116 MYCOBACTERIA CULTURE: CPT

## 2023-09-29 PROCEDURE — 3600000012 HC SURGERY LEVEL 2 ADDTL 15MIN: Performed by: OTOLARYNGOLOGY

## 2023-09-29 PROCEDURE — 99213 OFFICE O/P EST LOW 20 MIN: CPT | Performed by: OTOLARYNGOLOGY

## 2023-09-29 PROCEDURE — 7100000011 HC PHASE II RECOVERY - ADDTL 15 MIN: Performed by: OTOLARYNGOLOGY

## 2023-09-29 PROCEDURE — 2500000003 HC RX 250 WO HCPCS: Performed by: NURSE ANESTHETIST, CERTIFIED REGISTERED

## 2023-09-29 PROCEDURE — 87075 CULTR BACTERIA EXCEPT BLOOD: CPT

## 2023-09-29 PROCEDURE — 1111F DSCHRG MED/CURRENT MED MERGE: CPT | Performed by: OTOLARYNGOLOGY

## 2023-09-29 PROCEDURE — 88305 TISSUE EXAM BY PATHOLOGIST: CPT

## 2023-09-29 PROCEDURE — 87015 SPECIMEN INFECT AGNT CONCNTJ: CPT

## 2023-09-29 PROCEDURE — 2709999900 HC NON-CHARGEABLE SUPPLY: Performed by: OTOLARYNGOLOGY

## 2023-09-29 PROCEDURE — 6370000000 HC RX 637 (ALT 250 FOR IP): Performed by: OTOLARYNGOLOGY

## 2023-09-29 PROCEDURE — 6370000000 HC RX 637 (ALT 250 FOR IP): Performed by: ANESTHESIOLOGY

## 2023-09-29 PROCEDURE — 87102 FUNGUS ISOLATION CULTURE: CPT

## 2023-09-29 PROCEDURE — 3074F SYST BP LT 130 MM HG: CPT | Performed by: OTOLARYNGOLOGY

## 2023-09-29 PROCEDURE — 1036F TOBACCO NON-USER: CPT | Performed by: OTOLARYNGOLOGY

## 2023-09-29 PROCEDURE — 87205 SMEAR GRAM STAIN: CPT

## 2023-09-29 PROCEDURE — G8427 DOCREV CUR MEDS BY ELIG CLIN: HCPCS | Performed by: OTOLARYNGOLOGY

## 2023-09-29 PROCEDURE — 7100000001 HC PACU RECOVERY - ADDTL 15 MIN: Performed by: OTOLARYNGOLOGY

## 2023-09-29 PROCEDURE — 2500000003 HC RX 250 WO HCPCS: Performed by: OTOLARYNGOLOGY

## 2023-09-29 PROCEDURE — 87206 SMEAR FLUORESCENT/ACID STAI: CPT

## 2023-09-29 PROCEDURE — 6360000002 HC RX W HCPCS: Performed by: OTOLARYNGOLOGY

## 2023-09-29 PROCEDURE — 87070 CULTURE OTHR SPECIMN AEROBIC: CPT

## 2023-09-29 PROCEDURE — 7100000010 HC PHASE II RECOVERY - FIRST 15 MIN: Performed by: OTOLARYNGOLOGY

## 2023-09-29 PROCEDURE — 3700000000 HC ANESTHESIA ATTENDED CARE: Performed by: OTOLARYNGOLOGY

## 2023-09-29 PROCEDURE — 3700000001 HC ADD 15 MINUTES (ANESTHESIA): Performed by: OTOLARYNGOLOGY

## 2023-09-29 PROCEDURE — 2580000003 HC RX 258: Performed by: OTOLARYNGOLOGY

## 2023-09-29 PROCEDURE — 82962 GLUCOSE BLOOD TEST: CPT

## 2023-09-29 PROCEDURE — 6360000002 HC RX W HCPCS: Performed by: NURSE ANESTHETIST, CERTIFIED REGISTERED

## 2023-09-29 PROCEDURE — 1123F ACP DISCUSS/DSCN MKR DOCD: CPT | Performed by: OTOLARYNGOLOGY

## 2023-09-29 PROCEDURE — 86403 PARTICLE AGGLUT ANTBDY SCRN: CPT

## 2023-09-29 PROCEDURE — 7100000000 HC PACU RECOVERY - FIRST 15 MIN: Performed by: OTOLARYNGOLOGY

## 2023-09-29 PROCEDURE — 2580000003 HC RX 258: Performed by: NURSE ANESTHETIST, CERTIFIED REGISTERED

## 2023-09-29 RX ORDER — OXYMETAZOLINE HYDROCHLORIDE 0.05 G/100ML
2 SPRAY NASAL
Status: COMPLETED | OUTPATIENT
Start: 2023-09-29 | End: 2023-09-29

## 2023-09-29 RX ORDER — PHENYLEPHRINE HCL IN 0.9% NACL 1 MG/10 ML
SYRINGE (ML) INTRAVENOUS PRN
Status: DISCONTINUED | OUTPATIENT
Start: 2023-09-29 | End: 2023-09-29 | Stop reason: SDUPTHER

## 2023-09-29 RX ORDER — SODIUM CHLORIDE 0.9 % (FLUSH) 0.9 %
5-40 SYRINGE (ML) INJECTION PRN
Status: DISCONTINUED | OUTPATIENT
Start: 2023-09-29 | End: 2023-09-29 | Stop reason: HOSPADM

## 2023-09-29 RX ORDER — DOXYCYCLINE HYCLATE 100 MG
100 TABLET ORAL 2 TIMES DAILY
Qty: 28 TABLET | Refills: 0 | Status: SHIPPED | OUTPATIENT
Start: 2023-09-29 | End: 2023-10-13

## 2023-09-29 RX ORDER — SODIUM CHLORIDE 9 MG/ML
INJECTION, SOLUTION INTRAVENOUS PRN
Status: DISCONTINUED | OUTPATIENT
Start: 2023-09-29 | End: 2023-09-29 | Stop reason: HOSPADM

## 2023-09-29 RX ORDER — FENTANYL CITRATE 50 UG/ML
INJECTION, SOLUTION INTRAMUSCULAR; INTRAVENOUS PRN
Status: DISCONTINUED | OUTPATIENT
Start: 2023-09-29 | End: 2023-09-29 | Stop reason: SDUPTHER

## 2023-09-29 RX ORDER — PROPOFOL 10 MG/ML
INJECTION, EMULSION INTRAVENOUS PRN
Status: DISCONTINUED | OUTPATIENT
Start: 2023-09-29 | End: 2023-09-29 | Stop reason: SDUPTHER

## 2023-09-29 RX ORDER — OXYMETAZOLINE HYDROCHLORIDE 0.05 G/100ML
SPRAY NASAL PRN
Status: DISCONTINUED | OUTPATIENT
Start: 2023-09-29 | End: 2023-09-29 | Stop reason: ALTCHOICE

## 2023-09-29 RX ORDER — ROCURONIUM BROMIDE 10 MG/ML
INJECTION, SOLUTION INTRAVENOUS PRN
Status: DISCONTINUED | OUTPATIENT
Start: 2023-09-29 | End: 2023-09-29 | Stop reason: SDUPTHER

## 2023-09-29 RX ORDER — TRAMADOL HYDROCHLORIDE 50 MG/1
100 TABLET ORAL PRN
Status: COMPLETED | OUTPATIENT
Start: 2023-09-29 | End: 2023-09-29

## 2023-09-29 RX ORDER — LIDOCAINE HYDROCHLORIDE AND EPINEPHRINE 10; 10 MG/ML; UG/ML
INJECTION, SOLUTION INFILTRATION; PERINEURAL PRN
Status: DISCONTINUED | OUTPATIENT
Start: 2023-09-29 | End: 2023-09-29 | Stop reason: ALTCHOICE

## 2023-09-29 RX ORDER — ONDANSETRON 4 MG/1
4 TABLET, FILM COATED ORAL 3 TIMES DAILY PRN
Qty: 15 TABLET | Refills: 0 | Status: SHIPPED | OUTPATIENT
Start: 2023-09-29

## 2023-09-29 RX ORDER — METHYLPREDNISOLONE 4 MG/1
TABLET ORAL
COMMUNITY
Start: 2023-09-27

## 2023-09-29 RX ORDER — HYDROCODONE BITARTRATE AND ACETAMINOPHEN 5; 325 MG/1; MG/1
1 TABLET ORAL EVERY 4 HOURS PRN
Qty: 12 TABLET | Refills: 0 | Status: SHIPPED | OUTPATIENT
Start: 2023-09-29 | End: 2023-10-02

## 2023-09-29 RX ORDER — BACITRACIN ZINC 500 [USP'U]/G
OINTMENT TOPICAL PRN
Status: DISCONTINUED | OUTPATIENT
Start: 2023-09-29 | End: 2023-09-29 | Stop reason: ALTCHOICE

## 2023-09-29 RX ORDER — SODIUM CHLORIDE 9 MG/ML
INJECTION, SOLUTION INTRAVENOUS CONTINUOUS PRN
Status: DISCONTINUED | OUTPATIENT
Start: 2023-09-29 | End: 2023-09-29 | Stop reason: SDUPTHER

## 2023-09-29 RX ORDER — SODIUM CHLORIDE 0.9 % (FLUSH) 0.9 %
5-40 SYRINGE (ML) INJECTION EVERY 12 HOURS SCHEDULED
Status: DISCONTINUED | OUTPATIENT
Start: 2023-09-29 | End: 2023-09-29 | Stop reason: HOSPADM

## 2023-09-29 RX ORDER — ONDANSETRON 2 MG/ML
INJECTION INTRAMUSCULAR; INTRAVENOUS PRN
Status: DISCONTINUED | OUTPATIENT
Start: 2023-09-29 | End: 2023-09-29 | Stop reason: SDUPTHER

## 2023-09-29 RX ORDER — TRAMADOL HYDROCHLORIDE 50 MG/1
50 TABLET ORAL PRN
Status: COMPLETED | OUTPATIENT
Start: 2023-09-29 | End: 2023-09-29

## 2023-09-29 RX ORDER — LIDOCAINE HYDROCHLORIDE 20 MG/ML
INJECTION, SOLUTION EPIDURAL; INFILTRATION; INTRACAUDAL; PERINEURAL PRN
Status: DISCONTINUED | OUTPATIENT
Start: 2023-09-29 | End: 2023-09-29 | Stop reason: SDUPTHER

## 2023-09-29 RX ORDER — DEXAMETHASONE SODIUM PHOSPHATE 4 MG/ML
INJECTION, SOLUTION INTRA-ARTICULAR; INTRALESIONAL; INTRAMUSCULAR; INTRAVENOUS; SOFT TISSUE PRN
Status: DISCONTINUED | OUTPATIENT
Start: 2023-09-29 | End: 2023-09-29 | Stop reason: SDUPTHER

## 2023-09-29 RX ADMIN — PROPOFOL 120 MG: 10 INJECTION, EMULSION INTRAVENOUS at 15:33

## 2023-09-29 RX ADMIN — LIDOCAINE HYDROCHLORIDE 100 MG: 20 INJECTION, SOLUTION EPIDURAL; INFILTRATION; INTRACAUDAL; PERINEURAL at 15:33

## 2023-09-29 RX ADMIN — FENTANYL CITRATE 100 MCG: 50 INJECTION, SOLUTION INTRAMUSCULAR; INTRAVENOUS at 15:33

## 2023-09-29 RX ADMIN — WATER 2000 MG: 1 INJECTION INTRAMUSCULAR; INTRAVENOUS; SUBCUTANEOUS at 15:21

## 2023-09-29 RX ADMIN — SODIUM CHLORIDE: 9 INJECTION, SOLUTION INTRAVENOUS at 15:21

## 2023-09-29 RX ADMIN — ONDANSETRON 4 MG: 2 INJECTION INTRAMUSCULAR; INTRAVENOUS at 15:41

## 2023-09-29 RX ADMIN — Medication 100 MCG: at 16:14

## 2023-09-29 RX ADMIN — Medication 100 MCG: at 16:17

## 2023-09-29 RX ADMIN — TRAMADOL HYDROCHLORIDE 100 MG: 50 TABLET, COATED ORAL at 17:00

## 2023-09-29 RX ADMIN — NASAL DECONGESTANT 2 SPRAY: 0.05 SPRAY NASAL at 13:59

## 2023-09-29 RX ADMIN — DEXAMETHASONE SODIUM PHOSPHATE 10 MG: 4 INJECTION, SOLUTION INTRAMUSCULAR; INTRAVENOUS at 15:41

## 2023-09-29 RX ADMIN — SUGAMMADEX 350 MG: 100 INJECTION, SOLUTION INTRAVENOUS at 16:17

## 2023-09-29 RX ADMIN — ROCURONIUM BROMIDE 40 MG: 10 INJECTION, SOLUTION INTRAVENOUS at 15:33

## 2023-09-29 ASSESSMENT — PAIN DESCRIPTION - FREQUENCY
FREQUENCY: CONTINUOUS

## 2023-09-29 ASSESSMENT — PAIN DESCRIPTION - PAIN TYPE
TYPE: SURGICAL PAIN

## 2023-09-29 ASSESSMENT — PAIN SCALES - GENERAL
PAINLEVEL_OUTOF10: 6
PAINLEVEL_OUTOF10: 7
PAINLEVEL_OUTOF10: 7

## 2023-09-29 ASSESSMENT — ENCOUNTER SYMPTOMS
SINUS PRESSURE: 1
FACIAL SWELLING: 1
SINUS PAIN: 1

## 2023-09-29 ASSESSMENT — PAIN DESCRIPTION - LOCATION
LOCATION: NOSE

## 2023-09-29 ASSESSMENT — PAIN DESCRIPTION - ONSET
ONSET: SUDDEN
ONSET: ON-GOING
ONSET: ON-GOING

## 2023-09-29 ASSESSMENT — PAIN DESCRIPTION - DESCRIPTORS
DESCRIPTORS: ACHING;DISCOMFORT
DESCRIPTORS: DISCOMFORT;BURNING
DESCRIPTORS: ACHING;DISCOMFORT
DESCRIPTORS: ACHING;DISCOMFORT

## 2023-09-29 ASSESSMENT — PAIN - FUNCTIONAL ASSESSMENT: PAIN_FUNCTIONAL_ASSESSMENT: NONE - DENIES PAIN

## 2023-09-29 NOTE — PROGRESS NOTES
[facility] PRE-ADMISSION TESTING INSTRUCTIONS    The Preadmission Testing patient is instructed accordingly using the following criteria (check applicable):    ARRIVAL INSTRUCTIONS:  [x] Parking the day of Surgery is located in the Main Entrance lot. Upon entering the door, make an immediate right to the surgery reception desk    [x] Bring photo ID and insurance card    [] Bring in a copy of Living will or Durable Power of  papers. [x] Please be sure to arrange transportation to and from the hospital    [x] Please arrange for someone to be with you the remainder of the day due to having anesthesia      GENERAL INSTRUCTIONS:    [x] Nothing by mouth after midnight, including gum, candy, mints or water    [x] You may brush your teeth, but do not swallow any water    [x] Take medications as instructed with 1-2 oz of water. ALL MEDS TAKEN THIS AM    [] Stop herbal supplements and vitamins 5 days prior to procedure    [x] Follow preop dosing of blood thinners per physician instructions    [] Do not take insulin or oral diabetic medications    [] If diabetic and have low blood sugar or feel symptomatic, take 1-2oz apple juice or glucose tablets    [] Bring inhalers day of surgery    [] Bring C-PAP/ Bi-Pap day of surgery    [] Bring urine specimen day of surgery    [] Antibacterial Soap shower or bath AM of Surgery, no lotion, powders or creams to surgical site    [] Follow bowel prep as instructed per surgeon    [x] No tobacco products within 24 hours of surgery     [x] No alcohol or illegal drug use within 24 hours of surgery.     [x] Jewelry, body piercing's, eyeglasses, contact lenses and dentures are not permitted into surgery (bring cases)      [] Please do not wear any nail polish or make up on the day of surgery    [] If not already done, you can expect a call from registration    [] If surgeon requests a time change you will be notified the day prior to surgery    [] If you

## 2023-09-29 NOTE — PATIENT INSTRUCTIONS
Thank you for choosing our [facility] [place] [facility] practice. We are committed to your medical treatment and  care. If you need to reschedule or cancel your surgery or follow up  appointment, please call the surgery scheduler at [phone]. INSTRUCTIONS FOR SURGERY Nasal Exploration I&D     Nothing to eat or drink after midnight the night before surgery unless surgery is at [facility] or otherwise instructed by the hospital.    DO NOT TAKE ANY ASPIRIN PRODUCTS 7 days prior to surgery-unless required by your cardiologist or primary care physician. Tylenol only. No Advil, Motrin, Aleve, or Ibuprofen    Any illegal drugs in your system (including Marijuana even if legally prescribed) will result in your surgery being cancelled. Please be sure to check with our office or the hospital on time frame for the drugs to be out of your system. Should your insurance change at any time you must contact our office. Failure to do so may result in your surgery being rescheduled. If you need paperwork filled out for work, you must give the office 2 weeks to complete and submit the forms.       [address] [place] [address] will call you a couple days prior to your surgery and give you further instructions, if any questions call them at [phone]

## 2023-09-29 NOTE — BRIEF OP NOTE
Brief Postoperative Note      Patient: Alva Marie  YOB: 1945  MRN: [id number]    Date of Procedure: 9/29/2023    Pre-Op Diagnosis Codes:     * Nasal abscess [J34.0]    Post-Op Diagnosis: Same       Procedure(s):  INCISION AND DRAINAGE OF NASAL ABSCESS AND NASAL EXPLORATION    Surgeon(s):  Maurizio Bay DO    Assistant:  Resident: Richarda Runner, DO    Anesthesia: General    Estimated Blood Loss (mL): Minimal    Complications: None    Specimens:   ID Type Source Tests Collected by Time Destination   1 : wound culture left nares Tissue Tissue CULTURE, ANAEROBIC, CULTURE, FUNGUS, GRAM STAIN, CULTURE, SURGICAL, CULTURE WITH SMEAR, ACID FAST BACILLIUS Grupo Fajardo, DO 9/29/2023 1549    2 : left nares wound for culture Tissue Tissue CULTURE, ANAEROBIC, CULTURE, FUNGUS, GRAM STAIN, CULTURE, SURGICAL, CULTURE WITH SMEAR, ACID FAST BACILLIUS Grupo Fajardo, DO 9/29/2023 1550    A : wound culture left nares Tissue Tissue SURGICAL PATHOLOGY Grupo Fajardo, DO 9/29/2023 1559        Implants:  * No implants in log *      Drains: * No LDAs found *    Findings: minimal purulence mostly on left side, necrotic anterior septal cartilage, necrotic mucoperichondrial tissue bilaterally, all isolated to anterior nose.  No posterior involvement  Electronically signed by Richarda Runner, DO on 9/29/2023 at 4:24 PM

## 2023-09-29 NOTE — TELEPHONE ENCOUNTER
Prior Authorization Form:      DEMOGRAPHICS:                     Patient Name:  Manuel Trammell  Patient :  1945            Insurance:  Payor: MEDICARE / Plan: MEDICARE PART A AND B / Product Type: *No Product type* /   Insurance ID Number:    Payer/Plan Subscr  Sex Relation Sub. Ins. ID Effective Group Num   1. Kala CORDOVA 1945 Male Self [id number]                                    [address] [address]   2.  [address] [facility] 1945 Male Self [id number]                                   [address]         DIAGNOSIS & PROCEDURE:                       Procedure/Operation: Nasal Exploration,Incision/Drainage            CPT Code: 98829    Diagnosis:  nasal abscess    ICD10 Code: j34.0    Location:  [facility]    Surgeon:  caroline    SCHEDULING INFORMATION:                          Date: 23    Time: n/a              Anesthesia:  General                                                       Status:  Outpatient        Special Comments:  include all chart notes       Electronically signed by Tremaine Gilbert MA on 2023 at 11:47 AM

## 2023-09-29 NOTE — DISCHARGE INSTRUCTIONS
Please follow up with Dr. Carlos A Owens in 1 week for removal of splints    Number:  [phone]    Office:  [facility]  [address] [facility] [address]  [place], [address] [place] [address]      Open Mouth and cover when sneezing, coughing  No nose blowing for 2 weeks  Nasal saline spray in each nostril 4-5 times a day  Take medicines as directed  Ice to back of neck for comfort  Sleep with head elevated 30 degrees for the next few days  Alternate with tylenol for pain. Use prescription for breakthrough pain  Take antibiotics as directed   Do not pick or manipulate nose  You may have some bleeding from the nose, this is to be expected. Pinch soft part of your nose for 15 minuets.  If bleeding persists call office or present to the ED

## 2023-09-29 NOTE — ANESTHESIA POSTPROCEDURE EVALUATION
Department of Anesthesiology  Postprocedure Note    Patient: Petra Meigs  MRN: [id number]  YOB: 1945  Date of evaluation: 9/29/2023      Procedure Summary     Date: 09/29/23 Room / Location: [facility] 02 / [facility],[address] [place] [address]    Anesthesia Start: 1128 Anesthesia Stop:     Procedure: INCISION AND DRAINAGE OF NASAL ABSCESS AND NASAL EXPLORATION (Nose) Diagnosis:       Nasal abscess      (Nasal abscess [J34.0])    Surgeons: Gordon Jorgensen DO Responsible Provider: Aaliyah Prado MD    Anesthesia Type: general ASA Status: 3          Anesthesia Type: No value filed.     Roya Phase I: Roya Score: 10    Roya Phase II:        Anesthesia Post Evaluation    Patient location during evaluation: PACU  Patient participation: complete - patient participated  Level of consciousness: awake  Airway patency: patent  Nausea & Vomiting: no nausea and no vomiting  Complications: no  Cardiovascular status: hemodynamically stable  Respiratory status: acceptable  Hydration status: euvolemic  Pain management: adequate

## 2023-09-29 NOTE — PROGRESS NOTES
Subjective:      Patient ID:  Jens Zambrano is a 78 y.o. male. HPI:    Patient presents today for evaluation of facial swelling. ENT was consulted on him for recent admission see consult note below:     Rene Hernadez is a 78 y.o. male who presents for evaluation of sinusitis. Patient reports 4 day history of progressively worsening facial swelling redness and pain over the base of the nose. Symptoms first started with sinus pressure and nasal congestion. Was given antibx that caused apparent sudden onset of worsening nasal lip and face swelling. He reports blowing and picking his nose for the past several days prior to onset of pain and swelling. Denies previous previous similar history. He does report hx of chronic nasal congestion in which he uses many over the counter nasal spray decongestants, vicks rubs, and claritin. He does state he has tendency to pick things. Denies purulent drainage. No sinus surgery in the past. + seasonal allergies. Since being on IV antibiotics patient reports much improvement in swelling and rey.    9/22/23: Patient seen today for follow up of nasal abscess. Reports some drainage from the nose and continued swelling. CT reviewed with septal abscess. 9/29/23: has been doing netti pot, ointment and antibiotics with some improvement ,still feels swelling in the nose and upper lip with nasal congestion and difficult breathing through nose       Patient's medications, allergies, past medical, surgical, social and family histories were reviewed and updated as appropriate. Review of Systems   Constitutional:  Negative for chills and fever. HENT:  Positive for congestion, facial swelling, sinus pressure and sinus pain. All other systems reviewed and are negative. Objective:   Physical Exam  Constitutional:       General: He is not in acute distress. Appearance: Normal appearance. HENT:      Head: Normocephalic and atraumatic.       Right Ear: Tympanic membrane

## 2023-09-30 NOTE — OP NOTE
[address]                  [address] [facility], [address] [place] [address]                                OPERATIVE REPORT    PATIENT NAME: Martina Parada                      :        1945  MED REC NO:   [id number]                            ROOM:  ACCOUNT NO:   [de-identified]                           ADMIT DATE: 2023  PROVIDER:     Andrew Swanson DO    DATE OF PROCEDURE:  2023    PREOPERATIVE DIAGNOSIS:  Nasal abscess. POSTOPERATIVE DIAGNOSIS:  Nasal abscess. PROCEDURES PERFORMED:  Incision and drainage of nasal abscess, nasal  septal debridement. SURGEON:  Mary Kay Prieto DO.    ASSISTANT:  Andrew Swanson DO, PGY-5. ANESTHESIA:  General.    ESTIMATED BLOOD LOSS:  Minimal.    COMPLICATIONS:  None. FINDINGS:  Minimal purulence expressed from the left nasal septum,  necrotic anterior septal cartilage, necrotic mucoperichondrial tissue  anteriorly bilaterally, all isolated to anterior nose. No posterior  septal involvement. Tissue culture, swab culture, surgical pathology  all sent. INDICATIONS FOR PROCEDURE:  This is an 78-year-old male with a history  of progressively worsening nasal septal pain, which developed into an  abscess that failed bedside I and D and antibiotics. Risks, benefits,  and alternatives were discussed including, but not limited to bleeding,  infection, damage to adjacent structures, need for further procedures,  septal necrosis, and saddle nose deformity. The patient understood and  agreed to proceed. DESCRIPTION OF PROCEDURE:  The patient was brought back to the operating  room table and placed supine. SCDs were placed and functioning. The  patient was handed to Anesthesia for proper endotracheal intubation. The patient was then prepped and draped in a sterile fashion. Using a  15 blade scalpel, an incision was made on the left anterior nasal vestibule  until the septal cartilage was reached on the left side. Using a  suction Lawrence, a submucoperichondrial flap was then raised over left septum. Minimal purulence was noted to be expressed over the area of  maximal fluctuance on the flap site. Cultures were taken and sent for  review. The mucoperichondrial flap was then raised from an anterior to  posterior direction on the left side. Using a 0 degree endoscope, the  entire left nasal septum was inspected and no damage was noted in the  posterior aspect. Attention was then turned back to the anterior nasal  septum. The necrotic septal cartilage was then removed with Cole Jacksonen  and handed off the field for pathologic review. The right side of the  nasal septum was then entered and a mucoperichondrial flap was raised on  the right side of the nasal septum. Necrotic tissue was found on the  anterior mucoperichondrium on the right side, nasal fill in the left  side. This was debrided bluntly until healthy bleeding tissue was seen. There was noted to be a large area of septal cartilage missing from the  surface from infection burden; however, there was cartilage present in  the inferior and superior aspects of the nasal septum. Once all  necrotic debris was removed, the nasal septum was irrigated with normal  saline solution. No further necrosis or abscess was seen. Bacitracin  was then placed in between the mucoperichondrial flaps. A quilting  stitch was used to close the superior aspect of the nasal septum. Restrepo  splints covered in bacitracin were then placed bilaterally and sutured  in a through-and-through fashion with nylon stitch. The patient was  handed back to Anesthesia for proper awakening. The patient tolerated  the procedure without complication. Dr. Judson Rosa was present and  scrubbed for the entire case.         Brian De La Garza DO    D: 09/29/2023 19:14:57       T: 09/30/2023 3:38:46     [id number]  Job#: [id number]     Doc#: [id number]    CC:

## 2023-10-01 LAB
MICROORGANISM SPEC CULT: ABNORMAL
MICROORGANISM SPEC CULT: ABNORMAL
MICROORGANISM/AGENT SPEC: ABNORMAL
SPECIMEN DESCRIPTION: ABNORMAL
SPECIMEN DESCRIPTION: ABNORMAL

## 2023-10-02 ENCOUNTER — TELEPHONE (OUTPATIENT)
Dept: ENT CLINIC | Age: 78
End: 2023-10-02

## 2023-10-02 LAB
MICROORGANISM SPEC CULT: NO GROWTH
MICROORGANISM/AGENT SPEC: ABNORMAL
SPECIMEN DESCRIPTION: ABNORMAL

## 2023-10-02 NOTE — TELEPHONE ENCOUNTER
Called patient in regards to post procedure 9/29/23 patient is doing well some sinus pressure headache, patients post op is scheduled for 10/6/23 at 7am. Curettage Text: The wound bed was treated with curettage after the biopsy was performed. Render In Bullet Format When Appropriate: No Billing Type: Third-Party Bill Lab: [id number] Lab Facility: [id number] Wound Care: Vaseline Size Of Lesion In Cm: 0 Anesthesia Type: 2% lidocaine with epinephrine Depth Of Biopsy: dermis Post-Care Instructions: I reviewed with the patient in detail post-care instructions. Patient is to keep the biopsy site dry overnight, and then apply vaseline twice daily under a bandaid until healed. Cryotherapy Text: The wound bed was treated with cryotherapy after the biopsy was performed. Type Of Destruction Used: Curettage Anesthesia Volume In Cc (Will Not Render If 0): 0.5 Consent: Verbal consent was obtained and risks were reviewed including but not limited to scarring, infection, bleeding, scabbing, incomplete removal, nerve damage and allergy to anesthesia. Electrodesiccation Text: The wound bed was treated with electrodesiccation after the biopsy was performed. Notification Instructions: Patient will be notified of biopsy results. However, patient instructed to call the office if not contacted within 2 weeks. Biopsy Type: H and E Dressing: bandage Silver Nitrate Text: The wound bed was treated with silver nitrate after the biopsy was performed. Biopsy Method: Personna blade Hemostasis: Drysol Detail Level: Detailed Electrodesiccation And Curettage Text: The wound bed was treated with electrodesiccation and curettage after the biopsy was performed. Was A Bandage Applied: Yes

## 2023-10-02 NOTE — TELEPHONE ENCOUNTER
Pt's wife called in, she would like to know if the pt can come in at 7 am on 10/06/23 instead of 8:45, he has another appt that day that he cannot miss. Please, advise. Eryn Portal can be reached at [phone]. Thank you.

## 2023-10-03 LAB
MICROORGANISM SPEC CULT: ABNORMAL
MICROORGANISM/AGENT SPEC: ABNORMAL
SPECIMEN DESCRIPTION: ABNORMAL

## 2023-10-05 LAB
MICROORGANISM SPEC CULT: NORMAL
MICROORGANISM/AGENT SPEC: NORMAL
MICROORGANISM/AGENT SPEC: NORMAL
SPECIMEN DESCRIPTION: NORMAL
SURGICAL PATHOLOGY REPORT: NORMAL

## 2023-10-06 ENCOUNTER — TELEPHONE (OUTPATIENT)
Dept: NON INVASIVE DIAGNOSTICS | Age: 78
End: 2023-10-06

## 2023-10-06 ENCOUNTER — OFFICE VISIT (OUTPATIENT)
Dept: ENT CLINIC | Age: 78
End: 2023-10-06
Payer: MEDICARE

## 2023-10-06 VITALS
BODY MASS INDEX: 31.97 KG/M2 | RESPIRATION RATE: 14 BRPM | DIASTOLIC BLOOD PRESSURE: 79 MMHG | HEART RATE: 70 BPM | HEIGHT: 72 IN | WEIGHT: 236 LBS | OXYGEN SATURATION: 95 % | SYSTOLIC BLOOD PRESSURE: 150 MMHG | TEMPERATURE: 98.4 F

## 2023-10-06 DIAGNOSIS — J34.0 NASAL SEPTAL ABSCESS: Primary | ICD-10-CM

## 2023-10-06 PROCEDURE — 1036F TOBACCO NON-USER: CPT | Performed by: OTOLARYNGOLOGY

## 2023-10-06 PROCEDURE — G8417 CALC BMI ABV UP PARAM F/U: HCPCS | Performed by: OTOLARYNGOLOGY

## 2023-10-06 PROCEDURE — 1123F ACP DISCUSS/DSCN MKR DOCD: CPT | Performed by: OTOLARYNGOLOGY

## 2023-10-06 PROCEDURE — G8484 FLU IMMUNIZE NO ADMIN: HCPCS | Performed by: OTOLARYNGOLOGY

## 2023-10-06 PROCEDURE — 3074F SYST BP LT 130 MM HG: CPT | Performed by: OTOLARYNGOLOGY

## 2023-10-06 PROCEDURE — 1111F DSCHRG MED/CURRENT MED MERGE: CPT | Performed by: OTOLARYNGOLOGY

## 2023-10-06 PROCEDURE — G8427 DOCREV CUR MEDS BY ELIG CLIN: HCPCS | Performed by: OTOLARYNGOLOGY

## 2023-10-06 PROCEDURE — 99213 OFFICE O/P EST LOW 20 MIN: CPT | Performed by: OTOLARYNGOLOGY

## 2023-10-06 PROCEDURE — 3078F DIAST BP <80 MM HG: CPT | Performed by: OTOLARYNGOLOGY

## 2023-10-06 ASSESSMENT — ENCOUNTER SYMPTOMS
SINUS PRESSURE: 1
SINUS PAIN: 1
FACIAL SWELLING: 1

## 2023-10-06 NOTE — PROGRESS NOTES
in acute distress. Appearance: Normal appearance. HENT:      Head: Normocephalic and atraumatic. Right Ear: Tympanic membrane and ear canal normal.      Left Ear: Tympanic membrane and ear canal normal.      Nose: Congestion present. Comments: Splints removed, septum is midline and without any bulging or drainage. Incision site is healed with some crusting. Mouth/Throat:      Mouth: Mucous membranes are dry. Pharynx: No posterior oropharyngeal erythema. Eyes:      Extraocular Movements: Extraocular movements intact. Pupils: Pupils are equal, round, and reactive to light. Cardiovascular:      Rate and Rhythm: Normal rate. Pulmonary:      Effort: Pulmonary effort is normal.   Musculoskeletal:         General: Normal range of motion. Cervical back: Normal range of motion and neck supple. Lymphadenopathy:      Cervical: No cervical adenopathy. Skin:     General: Skin is warm and dry. Neurological:      General: No focal deficit present. Mental Status: He is alert and oriented to person, place, and time. Psychiatric:         Mood and Affect: Mood normal.         Behavior: Behavior normal.               Assessment:       Diagnosis Orders   1. Nasal septal abscess                   Plan:     Patient with persistent septal abscess despite antibiotics, bedside I&D, bactroban and irrigations. POD #7 s/p I&D in OR. Doing well without signs of re-accumulation or signs of perforation. Finish oral antibiotics and continue saline flushes. Culture + MRSA     Follow up in 6 weeks    Electronically signed by Lorenzo Willson DO on 10/6/2023 at 7:37 AM        [address] [place] [address]  1945    I have discussed the case, including pertinent history and exam findings with the resident. I have seen and examined the patient and the key elements of the encounter have been performed by me.  I agree with the assessment, plan and orders as documented by the  resident              Remainder of

## 2023-10-06 NOTE — TELEPHONE ENCOUNTER
----- Message from Blanca Louis DO sent at 10/5/2023  8:17 PM EDT -----  Regarding: remote  Patient had surgery (ENT) at end of 9/2023. Now in AF since late 9/2023. Repeat remote in 1 week to reassess AF.     Blanca Louis DO

## 2023-10-09 ENCOUNTER — TELEPHONE (OUTPATIENT)
Dept: ENT CLINIC | Age: 78
End: 2023-10-09

## 2023-10-09 NOTE — TELEPHONE ENCOUNTER
Called patient to discuss current symptoms patient feels clogged in nasal cavity patient had nasal exploration recently, patient was good for 2 days and then started becoming clogged harder to breathe, using nasal saline 3-4 times a day, tia pot 2 times per day and antibiotics. Patient has extra pillows to elevate bed patient would like more recommendations to help with nasal cavity being clogged.    [phone]

## 2023-10-09 NOTE — TELEPHONE ENCOUNTER
Patient LVM stating he was in to see Dr. Linda Dooley 10/6 for his post op and had his splints removed. States he felt good for 2 days after but now his nose is \"blocked up\" and would like a return call. Patient had surgery 9/29/23.     Electronically signed by Kehinde Hussein on 10/9/23 at 8:17 AM EDT

## 2023-10-10 LAB
MICROORGANISM SPEC CULT: NORMAL
MICROORGANISM/AGENT SPEC: NORMAL
SPECIMEN DESCRIPTION: NORMAL

## 2023-10-10 NOTE — TELEPHONE ENCOUNTER
Called patient in regards to clogged nasal cavity at night per provider, use flonase after rinsing sinus cavity with nasal flush try decreasing the amount using saline per day and use flonase. Patient understood and also needs a release to go back to physical therapy will write letter and fax [phone].

## 2023-10-12 ENCOUNTER — TELEPHONE (OUTPATIENT)
Dept: NON INVASIVE DIAGNOSTICS | Age: 78
End: 2023-10-12

## 2023-10-12 NOTE — TELEPHONE ENCOUNTER
Patient's wife, Reyna Arzate, called concerned because Neno's blood sugars have been lower than usual. She asked if the Amiodarone can effect his blood sugars. I informed her that the Amiodarone and Glimepiride can cause lower blood sugars. I asked her to contact the physician who is managing Neno's diabetes and inform them he was started on Amiodarone. She will do this today.     Carlos Sneed RN, BSN  [address] [facility]

## 2023-10-15 LAB
MICROORGANISM SPEC CULT: NORMAL
MICROORGANISM/AGENT SPEC: NORMAL
SPECIMEN DESCRIPTION: NORMAL

## 2023-10-17 LAB
MICROORGANISM SPEC CULT: NORMAL
MICROORGANISM/AGENT SPEC: NORMAL
SPECIMEN DESCRIPTION: NORMAL

## 2023-10-18 ENCOUNTER — TELEPHONE (OUTPATIENT)
Dept: NON INVASIVE DIAGNOSTICS | Age: 78
End: 2023-10-18

## 2023-10-18 NOTE — TELEPHONE ENCOUNTER
I spoke with Dr. Khushi Ulrich. He suggests the patient decrease Amiodarone to 200 mg daily. I spoke with patient's wife, Miranda Arthur. I instructed her to have Zulema Ventura take Amiodarone once daily. I asked that she call with a status update. She will do so.     Chuck Smith RN, BSN  [address] [facility]

## 2023-10-18 NOTE — TELEPHONE ENCOUNTER
Patient's wife called stating Manav Estrada is not feeling well since starting the Amiodarone. He has no appetite, increase in tremors, legs are weak and more fatigued. His blood sugars were dropping and his pcp decreased his diabetic meds. They want to know if they should decrease or discontinue the Amiodarone. I will talk with Dr. Nica Doan.     Alok Rudolph RN, BSN  [address] [facility]

## 2023-10-22 LAB
MICROORGANISM SPEC CULT: NORMAL
MICROORGANISM/AGENT SPEC: NORMAL
SPECIMEN DESCRIPTION: NORMAL

## 2023-10-23 ENCOUNTER — TELEPHONE (OUTPATIENT)
Dept: ENT CLINIC | Age: 78
End: 2023-10-23

## 2023-10-23 DIAGNOSIS — J34.0 NASAL ABSCESS: Primary | ICD-10-CM

## 2023-10-23 PROBLEM — G89.18 POST-OP PAIN: Status: ACTIVE | Noted: 2023-10-23

## 2023-10-23 NOTE — TELEPHONE ENCOUNTER
Spoke with provider will order CT sinus to recheck nasal cavity and follow up in November called and spoke with patients wife will call radiology scheduling to schedule CT scan.

## 2023-10-23 NOTE — TELEPHONE ENCOUNTER
Please advise if patient needs to be seen sooner after surgery or if there is something he can do for relief in the meantime.     Electronically signed by Campos Ojeda MA on 10/23/23 at 8:57 AM EDT

## 2023-10-23 NOTE — TELEPHONE ENCOUNTER
Pt's wife called in requesting to move up his 11/8/23, she stated the pt is miserable. Please, advise. Jenna Stovall can be reached at [phone]. Thank you.

## 2023-10-23 NOTE — TELEPHONE ENCOUNTER
Called patient to discuss current symptoms states nasal cavity is clogged using netti pot once per day,humidifier, nasal strips to help breathe, saline is used morning and early evening is waking up at 3am to go outside for air due to nasal cavity feeling clogged.     I will order another Ct sinus - [place]

## 2023-10-24 LAB
MICROORGANISM SPEC CULT: NORMAL
MICROORGANISM/AGENT SPEC: NORMAL
SPECIMEN DESCRIPTION: NORMAL

## 2023-10-29 LAB
MICROORGANISM SPEC CULT: NORMAL
MICROORGANISM/AGENT SPEC: NORMAL
SPECIMEN DESCRIPTION: NORMAL

## 2023-10-31 ENCOUNTER — HOSPITAL ENCOUNTER (OUTPATIENT)
Dept: CT IMAGING | Age: 78
Discharge: HOME | End: 2023-11-02
Attending: OTOLARYNGOLOGY
Payer: MEDICARE

## 2023-10-31 DIAGNOSIS — J34.0 NASAL ABSCESS: ICD-10-CM

## 2023-10-31 LAB
MICROORGANISM SPEC CULT: NORMAL
MICROORGANISM SPEC CULT: NORMAL
MICROORGANISM/AGENT SPEC: NORMAL
MICROORGANISM/AGENT SPEC: NORMAL
SPECIMEN DESCRIPTION: NORMAL
SPECIMEN DESCRIPTION: NORMAL

## 2023-10-31 PROCEDURE — 70486 CT MAXILLOFACIAL W/O DYE: CPT

## 2023-11-03 ENCOUNTER — TELEPHONE (OUTPATIENT)
Dept: NON INVASIVE DIAGNOSTICS | Age: 78
End: 2023-11-03

## 2023-11-03 NOTE — TELEPHONE ENCOUNTER
Patient and his wife called. Katherine Sorensen is still complaining of extreme weakness, fatigue, lower back discomfort. He states it is difficult to ambulate because his legs feel so weak. His appetite has improved a little since his Amiodarone was decreased to once daily on 10/18/2023. He is taking his wife to a doctor's appointment now. I asked him to send a remote Latitude transmission when he gets home. I want to assess for any AF episodes. He will do this. I will await the transmission.     Andra Cabot RN, BSN  [address] [facility]

## 2023-11-03 NOTE — TELEPHONE ENCOUNTER
I reviewed remote Latitude transmission. Patient has been in Atrial fibrillation since 9/15/2023. Ventricular rates are better controlled at this time. He paces in the ventricle 89%. I will send message off to Dr. Debra Leal to review.     Negrita Shelton RN, BSN  [address] [facility]

## 2023-11-06 ENCOUNTER — TELEPHONE (OUTPATIENT)
Dept: NON INVASIVE DIAGNOSTICS | Age: 78
End: 2023-11-06

## 2023-11-06 NOTE — TELEPHONE ENCOUNTER
----- Message from Benito Lau, [facility] sent at 11/3/2023  2:51 PM EDT -----  Regarding: FW: DCCV    ----- Message -----  From: Marcin Vaughan DO  Sent: 11/3/2023   2:13 PM EDT  To: Benito Lau MA  Subject: DCCV                                             Please schedule for DCCV after confirming OAC compliance > 3 weeks.     It appears he had ENT surgery in late 10/2023.    -Marcin Vaughan DO

## 2023-11-07 LAB
MICROORGANISM SPEC CULT: NORMAL
MICROORGANISM/AGENT SPEC: NORMAL
SPECIMEN DESCRIPTION: NORMAL

## 2023-11-08 ENCOUNTER — OFFICE VISIT (OUTPATIENT)
Dept: ENT CLINIC | Age: 78
End: 2023-11-08
Payer: MEDICARE

## 2023-11-08 VITALS — WEIGHT: 235 LBS | BODY MASS INDEX: 31.83 KG/M2 | HEIGHT: 72 IN

## 2023-11-08 DIAGNOSIS — J34.0 NASAL ABSCESS: Primary | ICD-10-CM

## 2023-11-08 DIAGNOSIS — J34.89 NASAL OBSTRUCTION: ICD-10-CM

## 2023-11-08 DIAGNOSIS — R09.81 NASAL CONGESTION: ICD-10-CM

## 2023-11-08 PROCEDURE — G8427 DOCREV CUR MEDS BY ELIG CLIN: HCPCS | Performed by: OTOLARYNGOLOGY

## 2023-11-08 PROCEDURE — G8417 CALC BMI ABV UP PARAM F/U: HCPCS | Performed by: OTOLARYNGOLOGY

## 2023-11-08 PROCEDURE — 1123F ACP DISCUSS/DSCN MKR DOCD: CPT | Performed by: OTOLARYNGOLOGY

## 2023-11-08 PROCEDURE — G8484 FLU IMMUNIZE NO ADMIN: HCPCS | Performed by: OTOLARYNGOLOGY

## 2023-11-08 PROCEDURE — 99213 OFFICE O/P EST LOW 20 MIN: CPT | Performed by: OTOLARYNGOLOGY

## 2023-11-08 PROCEDURE — 1036F TOBACCO NON-USER: CPT | Performed by: OTOLARYNGOLOGY

## 2023-11-08 RX ORDER — DOXYCYCLINE HYCLATE 100 MG
100 TABLET ORAL 2 TIMES DAILY
Qty: 20 TABLET | Refills: 0 | Status: SHIPPED | OUTPATIENT
Start: 2023-11-08 | End: 2023-11-18

## 2023-11-08 RX ORDER — AZELASTINE 1 MG/ML
2 SPRAY, METERED NASAL DAILY
Qty: 120 ML | Refills: 3 | Status: SHIPPED | OUTPATIENT
Start: 2023-11-08

## 2023-11-08 ASSESSMENT — ENCOUNTER SYMPTOMS
SINUS PRESSURE: 1
FACIAL SWELLING: 1
SINUS PAIN: 1

## 2023-11-08 NOTE — PATIENT INSTRUCTIONS
Astelin and Flonase 2 sprays each side of the nose nightly   Benadryl over the counter nightly  (take as directed on the box)  Zyrtec over the counter daily in the morning (take dose as directed on the box)  Complete course of antibiotics

## 2023-11-08 NOTE — PROGRESS NOTES
Subjective:      Patient ID:  Sebastian Montgomery is a 78 y.o. male. HPI:    Patient presents today for evaluation of facial swelling. ENT was consulted on him for recent admission see consult note below:     Fabiola Castorena is a 78 y.o. male who presents for evaluation of sinusitis. Patient reports 4 day history of progressively worsening facial swelling redness and pain over the base of the nose. Symptoms first started with sinus pressure and nasal congestion. Was given antibx that caused apparent sudden onset of worsening nasal lip and face swelling. He reports blowing and picking his nose for the past several days prior to onset of pain and swelling. Denies previous previous similar history. He does report hx of chronic nasal congestion in which he uses many over the counter nasal spray decongestants, vicks rubs, and claritin. He does state he has tendency to pick things. Denies purulent drainage. No sinus surgery in the past. + seasonal allergies. Since being on IV antibiotics patient reports much improvement in swelling and rey.    9/22/23: Patient seen today for follow up of nasal abscess. Reports some drainage from the nose and continued swelling. CT reviewed with septal abscess. 9/29/23: has been doing netti pot, ointment and antibiotics with some improvement ,still feels swelling in the nose and upper lip with nasal congestion and difficult breathing through nose     9/29/23: OR for I&D of  septal abscess    10/6/23: Patient seen for 1 week post op check after I&D of septal abscess. Reports some mild drainage and discomfort from the splints. 11/8/23: patient reports he did have some relief after surgery but has been chronically congested. Daily he feels he cannot breath out of either side of her nose, has difficulty especially breathing at night and sleeping through the night. No fevers, chills, or further drainage.      Patient's medications, allergies, past medical, surgical, social and family histories

## 2023-11-14 LAB
MICROORGANISM SPEC CULT: NORMAL
MICROORGANISM/AGENT SPEC: NORMAL
SPECIMEN DESCRIPTION: NORMAL

## 2023-11-16 ENCOUNTER — ANESTHESIA (OUTPATIENT)
Age: 78
End: 2023-11-16
Payer: MEDICARE

## 2023-11-16 ENCOUNTER — ANESTHESIA EVENT (OUTPATIENT)
Age: 78
End: 2023-11-16
Payer: MEDICARE

## 2023-11-16 ENCOUNTER — HOSPITAL ENCOUNTER (OUTPATIENT)
Age: 78
Discharge: HOME | End: 2023-11-18
Attending: STUDENT IN AN ORGANIZED HEALTH CARE EDUCATION/TRAINING PROGRAM
Payer: MEDICARE

## 2023-11-16 VITALS
SYSTOLIC BLOOD PRESSURE: 150 MMHG | TEMPERATURE: 97.2 F | HEIGHT: 72 IN | DIASTOLIC BLOOD PRESSURE: 84 MMHG | RESPIRATION RATE: 20 BRPM | HEART RATE: 72 BPM | BODY MASS INDEX: 31.15 KG/M2 | WEIGHT: 230 LBS | OXYGEN SATURATION: 96 %

## 2023-11-16 DIAGNOSIS — I48.0 AF (PAROXYSMAL ATRIAL FIBRILLATION) (HCC): ICD-10-CM

## 2023-11-16 LAB
ANION GAP SERPL CALCULATED.3IONS-SCNC: 14 MMOL/L (ref 7–16)
BASOPHILS # BLD: 0.03 K/UL (ref 0–0.2)
BASOPHILS NFR BLD: 1 % (ref 0–2)
BUN SERPL-MCNC: 21 MG/DL (ref 6–23)
CALCIUM SERPL-MCNC: 9.1 MG/DL (ref 8.6–10.2)
CHLORIDE SERPL-SCNC: 104 MMOL/L (ref 98–107)
CO2 SERPL-SCNC: 24 MMOL/L (ref 22–29)
CREAT SERPL-MCNC: 1.2 MG/DL (ref 0.7–1.2)
ECHO BSA: 2.3 M2
EKG ATRIAL RATE: 68 BPM
EKG P AXIS: 21 DEGREES
EKG P-R INTERVAL: 184 MS
EKG Q-T INTERVAL: 470 MS
EKG QRS DURATION: 154 MS
EKG QTC CALCULATION (BAZETT): 499 MS
EKG R AXIS: 0 DEGREES
EKG T AXIS: -177 DEGREES
EKG VENTRICULAR RATE: 68 BPM
EOSINOPHIL # BLD: 0.17 K/UL (ref 0.05–0.5)
EOSINOPHILS RELATIVE PERCENT: 3 % (ref 0–6)
ERYTHROCYTE [DISTWIDTH] IN BLOOD BY AUTOMATED COUNT: 14.6 % (ref 11.5–15)
GFR SERPL CREATININE-BSD FRML MDRD: 60 ML/MIN/1.73M2
GLUCOSE SERPL-MCNC: 170 MG/DL (ref 74–99)
HCT VFR BLD AUTO: 37.2 % (ref 37–54)
HGB BLD-MCNC: 12.3 G/DL (ref 12.5–16.5)
IMM GRANULOCYTES # BLD AUTO: <0.03 K/UL (ref 0–0.58)
IMM GRANULOCYTES NFR BLD: 0 % (ref 0–5)
LYMPHOCYTES NFR BLD: 1.29 K/UL (ref 1.5–4)
LYMPHOCYTES RELATIVE PERCENT: 23 % (ref 20–42)
MAGNESIUM SERPL-MCNC: 1.2 MG/DL (ref 1.6–2.6)
MCH RBC QN AUTO: 33.8 PG (ref 26–35)
MCHC RBC AUTO-ENTMCNC: 33.1 G/DL (ref 32–34.5)
MCV RBC AUTO: 102.2 FL (ref 80–99.9)
MONOCYTES NFR BLD: 0.52 K/UL (ref 0.1–0.95)
MONOCYTES NFR BLD: 9 % (ref 2–12)
NEUTROPHILS NFR BLD: 65 % (ref 43–80)
NEUTS SEG NFR BLD: 3.69 K/UL (ref 1.8–7.3)
PLATELET # BLD AUTO: 197 K/UL (ref 130–450)
PMV BLD AUTO: 10.7 FL (ref 7–12)
POTASSIUM SERPL-SCNC: 4.5 MMOL/L (ref 3.5–5)
RBC # BLD AUTO: 3.64 M/UL (ref 3.8–5.8)
SODIUM SERPL-SCNC: 142 MMOL/L (ref 132–146)
WBC OTHER # BLD: 5.7 K/UL (ref 4.5–11.5)

## 2023-11-16 PROCEDURE — 92960 CARDIOVERSION ELECTRIC EXT: CPT

## 2023-11-16 PROCEDURE — 93005 ELECTROCARDIOGRAM TRACING: CPT | Performed by: STUDENT IN AN ORGANIZED HEALTH CARE EDUCATION/TRAINING PROGRAM

## 2023-11-16 PROCEDURE — 85025 COMPLETE CBC W/AUTO DIFF WBC: CPT

## 2023-11-16 PROCEDURE — 6360000002 HC RX W HCPCS: Performed by: NURSE ANESTHETIST, CERTIFIED REGISTERED

## 2023-11-16 PROCEDURE — 2580000003 HC RX 258: Performed by: NURSE ANESTHETIST, CERTIFIED REGISTERED

## 2023-11-16 PROCEDURE — 83735 ASSAY OF MAGNESIUM: CPT

## 2023-11-16 PROCEDURE — 6370000000 HC RX 637 (ALT 250 FOR IP): Performed by: STUDENT IN AN ORGANIZED HEALTH CARE EDUCATION/TRAINING PROGRAM

## 2023-11-16 PROCEDURE — 80048 BASIC METABOLIC PNL TOTAL CA: CPT

## 2023-11-16 PROCEDURE — 3700000000 HC ANESTHESIA ATTENDED CARE: Performed by: STUDENT IN AN ORGANIZED HEALTH CARE EDUCATION/TRAINING PROGRAM

## 2023-11-16 PROCEDURE — 93010 ELECTROCARDIOGRAM REPORT: CPT | Performed by: INTERNAL MEDICINE

## 2023-11-16 RX ORDER — MAGNESIUM OXIDE 400 MG/1
400 TABLET ORAL ONCE
Qty: 1 TABLET | Refills: 0 | Status: SHIPPED | OUTPATIENT
Start: 2023-11-16 | End: 2023-11-16

## 2023-11-16 RX ORDER — MAGNESIUM SULFATE IN WATER 40 MG/ML
2000 INJECTION, SOLUTION INTRAVENOUS ONCE
Status: DISCONTINUED | OUTPATIENT
Start: 2023-11-16 | End: 2023-11-16

## 2023-11-16 RX ORDER — PROPOFOL 10 MG/ML
INJECTION, EMULSION INTRAVENOUS PRN
Status: DISCONTINUED | OUTPATIENT
Start: 2023-11-16 | End: 2023-11-16 | Stop reason: SDUPTHER

## 2023-11-16 RX ORDER — SODIUM CHLORIDE 9 MG/ML
INJECTION, SOLUTION INTRAVENOUS CONTINUOUS PRN
Status: DISCONTINUED | OUTPATIENT
Start: 2023-11-16 | End: 2023-11-16 | Stop reason: SDUPTHER

## 2023-11-16 RX ORDER — LANOLIN ALCOHOL/MO/W.PET/CERES
400 CREAM (GRAM) TOPICAL DAILY
Status: DISCONTINUED | OUTPATIENT
Start: 2023-11-16 | End: 2023-11-16

## 2023-11-16 RX ORDER — MAGNESIUM OXIDE 400 MG/1
400 TABLET ORAL DAILY
Qty: 90 TABLET | Refills: 3 | Status: SHIPPED | OUTPATIENT
Start: 2023-11-16 | End: 2024-02-14

## 2023-11-16 RX ORDER — LANOLIN ALCOHOL/MO/W.PET/CERES
400 CREAM (GRAM) TOPICAL ONCE
Status: COMPLETED | OUTPATIENT
Start: 2023-11-16 | End: 2023-11-16

## 2023-11-16 RX ADMIN — PROPOFOL 50 MG: 10 INJECTION, EMULSION INTRAVENOUS at 11:40

## 2023-11-16 RX ADMIN — Medication 400 MG: at 12:05

## 2023-11-16 RX ADMIN — SODIUM CHLORIDE: 9 INJECTION, SOLUTION INTRAVENOUS at 11:33

## 2023-11-16 NOTE — PROCEDURES
resulted in successful restoration of normal sinus rhythm. A postprocedure ECG was performed and revealed  sinus rhythm. As patient had a cardiac implantable electronic device, the device was interrogated and revealed  stable function  with current rhythm of \"sinus rhythm. Device interrogation with stable function. Complications: none     Summary: Successful direct current cardioversion of atrial fibrillation with synchronized shock with energy of 200 joules. Stable device function. Remote interrogation of pacemaker in ~1 week.      Santino Romo DO  [facility]  [address] [place] [address] Physicians

## 2023-11-16 NOTE — DISCHARGE INSTRUCTIONS
DISCHARGE INSTRUCTIONS  - Medications: resume all prescribed medications. START magnesium oxide 400 mg tablet, take 1 tablet by mouth daily. Prescription sent to your [facility] pharmacy. - Follow-up:  -- Remote pacemaker interrogation will be performed in 1 week. -- Dr Martha Luis office: you are schedule on 12/6/2023 at 2:15 PM  - Activity/driving: no operating heavy machinery or driving for 24 hours. - Questions/concerns: please call [phone] ext #[phone].

## 2023-11-16 NOTE — H&P
[facility] CARDIOLOGY  CARDIAC ELECTROPHYSIOLOGY DEPARTMENT/DIVISION OF CARDIOLOGY  H&P Report  PATIENT: Denice Stevens  MEDICAL RECORD NUMBER: [id number]  DATE OF SERVICE:  2023  ATTENDING ELECTROPHYSIOLOGIST:  Max Troncoso D.O.  REFERRING PHYSICIAN: Perla Deras DO and Harry Piper MD  CHIEF COMPLAINT: PAF    HPI: Denice Stevens is a 78 y.o. 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 He is managed by Dr. Amaya Quinn with amiodarone 200 mg daily, amlodipine 5 mg daily, apixaban 5 mg twice daily, and PPI. In , patient was diagnosed with nonvalvular paroxysmal AF, which was treated with [address] Around this time, he was noted to have first-degree AV block (300 ms). In , patient was noted to have episodes of 2* AV block. In , AL interval had progressed to > 300 ms. In 2023, he was referred to my office. He complained of dyspnea on exertion as well as fatigue. 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 In 2023, he had AF recurrence, which was treated with DCCV in 2023. In late 2023, he had recurrence of AF and started on amiodarone then referred for DCCV. He presents today, 23; for outpatient DCCV. His PPM is enrolled in remote monitoring, which most recently reported stable device function, RA pacing 0%, RV pacing 89%, and AF ongoing since late 2023. He is compliant with OAC > 3 weeks. He reports fatigue with AF. He denies any other complaints at this time. Prior cardiac testing:   TTE (23): LVEF = 60%, severe concentric LVH, moderate AS, mild TR.  EC23: Sinus at 75 bpm, first-degree AV block (440 ms)  ECG 2021: normal sinus rhythm, LAFB, rate: 87 bpm   24 hour HM: 2020: has 2:1 av block mostly at night. He is asymptomatic.  I

## 2023-11-18 NOTE — ANESTHESIA POSTPROCEDURE EVALUATION
Department of Anesthesiology  Postprocedure Note    Patient: Hector Hodgson  MRN: [id number]  YOB: 1945  Date of evaluation: 11/17/2023      Procedure Summary     Date: 11/16/23 Room / Location: [facility] Cardiac Cath Lab    Anesthesia Start: 1133 Anesthesia Stop: 3532    Procedure: CARDIOVERSION EXTERNAL Diagnosis:       AF (paroxysmal atrial fibrillation) (HCC)      (AF)    Scheduled Providers: Kunal Nguyễn DO Responsible Provider: Nilda Flores MD    Anesthesia Type: MAC ASA Status: 4          Anesthesia Type: No value filed.     Roya Phase I:      Roya Phase II:        Anesthesia Post Evaluation    Patient location during evaluation: PACU  Patient participation: complete - patient participated  Level of consciousness: awake  Pain score: 0  Airway patency: patent  Nausea & Vomiting: no nausea  Complications: no  Cardiovascular status: hemodynamically stable  Respiratory status: acceptable  Hydration status: stable

## 2023-12-06 ENCOUNTER — OFFICE VISIT (OUTPATIENT)
Dept: NON INVASIVE DIAGNOSTICS | Age: 78
End: 2023-12-06
Payer: MEDICARE

## 2023-12-06 VITALS
OXYGEN SATURATION: 96 % | RESPIRATION RATE: 16 BRPM | DIASTOLIC BLOOD PRESSURE: 70 MMHG | SYSTOLIC BLOOD PRESSURE: 130 MMHG | WEIGHT: 228 LBS | BODY MASS INDEX: 30.88 KG/M2 | HEIGHT: 72 IN | HEART RATE: 72 BPM

## 2023-12-06 DIAGNOSIS — I48.0 PAF (PAROXYSMAL ATRIAL FIBRILLATION) (HCC): Primary | ICD-10-CM

## 2023-12-06 PROCEDURE — 1036F TOBACCO NON-USER: CPT | Performed by: STUDENT IN AN ORGANIZED HEALTH CARE EDUCATION/TRAINING PROGRAM

## 2023-12-06 PROCEDURE — 93000 ELECTROCARDIOGRAM COMPLETE: CPT | Performed by: STUDENT IN AN ORGANIZED HEALTH CARE EDUCATION/TRAINING PROGRAM

## 2023-12-06 PROCEDURE — G8427 DOCREV CUR MEDS BY ELIG CLIN: HCPCS | Performed by: STUDENT IN AN ORGANIZED HEALTH CARE EDUCATION/TRAINING PROGRAM

## 2023-12-06 PROCEDURE — 1123F ACP DISCUSS/DSCN MKR DOCD: CPT | Performed by: STUDENT IN AN ORGANIZED HEALTH CARE EDUCATION/TRAINING PROGRAM

## 2023-12-06 PROCEDURE — 3078F DIAST BP <80 MM HG: CPT | Performed by: STUDENT IN AN ORGANIZED HEALTH CARE EDUCATION/TRAINING PROGRAM

## 2023-12-06 PROCEDURE — G8417 CALC BMI ABV UP PARAM F/U: HCPCS | Performed by: STUDENT IN AN ORGANIZED HEALTH CARE EDUCATION/TRAINING PROGRAM

## 2023-12-06 PROCEDURE — 3075F SYST BP GE 130 - 139MM HG: CPT | Performed by: STUDENT IN AN ORGANIZED HEALTH CARE EDUCATION/TRAINING PROGRAM

## 2023-12-06 PROCEDURE — G8484 FLU IMMUNIZE NO ADMIN: HCPCS | Performed by: STUDENT IN AN ORGANIZED HEALTH CARE EDUCATION/TRAINING PROGRAM

## 2023-12-06 PROCEDURE — 99215 OFFICE O/P EST HI 40 MIN: CPT | Performed by: STUDENT IN AN ORGANIZED HEALTH CARE EDUCATION/TRAINING PROGRAM

## 2023-12-06 RX ORDER — MAGNESIUM OXIDE 400 MG/1
400 TABLET ORAL DAILY
Qty: 90 TABLET | Refills: 1 | Status: SHIPPED | OUTPATIENT
Start: 2023-12-06 | End: 2024-03-05

## 2023-12-06 RX ORDER — AMIODARONE HYDROCHLORIDE 200 MG/1
200 TABLET ORAL
Qty: 90 TABLET | Refills: 1 | Status: SHIPPED | OUTPATIENT
Start: 2023-12-06 | End: 2024-03-05

## 2023-12-06 NOTE — PATIENT INSTRUCTIONS
REDUCE amiodarone to 200 mg tablet every other day. Continue remote monitoring of pacemaker every 91 days. You will be contacted to schedule ablation procedure. Do NOT take apixaban (Eliquis) the morning of the procedure, but do NOT miss any other doses. Follow-up with this office 1 month after ablation procedure.

## 2023-12-06 NOTE — PROGRESS NOTES
[facility] CARDIOLOGY  CARDIAC ELECTROPHYSIOLOGY DEPARTMENT/DIVISION OF CARDIOLOGY  H&P Report  PATIENT: Denice Stevens  MEDICAL RECORD NUMBER: [id number]  DATE OF SERVICE:  2023  ATTENDING ELECTROPHYSIOLOGIST:  Max Troncoso D.O.  REFERRING PHYSICIAN: No ref. provider found and Harry Piper MD  CHIEF COMPLAINT: PAF    HPI: Denice Stevens is a 78 y.o. 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 He is managed by Dr. Amaya Quinn with amiodarone 200 mg daily, amlodipine 5 mg daily, apixaban 5 mg twice daily, and PPI. In , patient was diagnosed with nonvalvular paroxysmal AF, which was treated with [address] Around this time, he was noted to have first-degree AV block (300 ms). In , patient was noted to have episodes of 2* AV block. In , DC interval had progressed to > 300 ms. In 2023, he was referred to my office. He complained of dyspnea on exertion as well as fatigue. 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 In 2023, he had AF recurrence, which was treated with DCCV in 2023. In late 2023, he had recurrence of AF and started on amiodarone then referred for DCCV. In 2023, DCCV restored sinus. He complained of tremors, LE pain, and dizziness with 200 mg BID dosing, which improved with reduction to 200 mg daily. He presents today, 2023; for outpatient follow-up with my office. His PPM is enrolled in remote monitoring, but no remotes since DCCV in 2023. He denies any other complaints at this time.      Prior cardiac testing:   TTE (23): LVEF = 60%, severe concentric LVH, moderate AS, mild TR.  EC23: Sinus at 75 bpm, first-degree AV block (440 ms)  ECG 2021: normal sinus rhythm, LAFB, rate: 87 bpm   24 hour HM: 2020:

## 2023-12-22 ENCOUNTER — PREP FOR PROCEDURE (OUTPATIENT)
Dept: NON INVASIVE DIAGNOSTICS | Age: 78
End: 2023-12-22

## 2023-12-25 RX ORDER — SODIUM CHLORIDE 0.9 % (FLUSH) 0.9 %
5-40 SYRINGE (ML) INJECTION EVERY 12 HOURS SCHEDULED
Status: CANCELLED | OUTPATIENT
Start: 2023-12-25

## 2023-12-25 RX ORDER — SODIUM CHLORIDE 9 MG/ML
INJECTION, SOLUTION INTRAVENOUS PRN
Status: CANCELLED | OUTPATIENT
Start: 2023-12-25

## 2023-12-25 RX ORDER — SODIUM CHLORIDE 0.9 % (FLUSH) 0.9 %
5-40 SYRINGE (ML) INJECTION PRN
Status: CANCELLED | OUTPATIENT
Start: 2023-12-25

## 2024-01-02 ENCOUNTER — TELEPHONE (OUTPATIENT)
Dept: NON INVASIVE DIAGNOSTICS | Age: 79
End: 2024-01-02

## 2024-01-02 NOTE — TELEPHONE ENCOUNTER
Patient agreed to move AF ablation up to 01/05/2024 @ 11:30 AM with Dr. Quigley. The patient will get pre-labs done tomorrow.     Electronically signed by Camryn Loredo MA on 1/2/2024 at 2:37 PM

## 2024-01-03 ENCOUNTER — NURSE ONLY (OUTPATIENT)
Dept: FAMILY MEDICINE CLINIC | Age: 79
End: 2024-01-03

## 2024-01-03 DIAGNOSIS — I48.0 PAF (PAROXYSMAL ATRIAL FIBRILLATION) (HCC): ICD-10-CM

## 2024-01-03 DIAGNOSIS — I48.0 PAF (PAROXYSMAL ATRIAL FIBRILLATION) (HCC): Primary | ICD-10-CM

## 2024-01-03 LAB
ABSOLUTE IMMATURE GRANULOCYTE: <0.03 K/UL (ref 0–0.58)
BASOPHILS ABSOLUTE: 0.02 K/UL (ref 0–0.2)
BASOPHILS RELATIVE PERCENT: 0 % (ref 0–2)
EOSINOPHILS ABSOLUTE: 0.28 K/UL (ref 0.05–0.5)
EOSINOPHILS RELATIVE PERCENT: 6 % (ref 0–6)
HCT VFR BLD CALC: 39.6 % (ref 37–54)
HEMOGLOBIN: 13.1 G/DL (ref 12.5–16.5)
IMMATURE GRANULOCYTES: 0 % (ref 0–5)
LYMPHOCYTES ABSOLUTE: 1.33 K/UL (ref 1.5–4)
LYMPHOCYTES RELATIVE PERCENT: 26 % (ref 20–42)
MAGNESIUM: 1.7 MG/DL (ref 1.6–2.6)
MCH RBC QN AUTO: 34.2 PG (ref 26–35)
MCHC RBC AUTO-ENTMCNC: 33.1 G/DL (ref 32–34.5)
MCV RBC AUTO: 103.4 FL (ref 80–99.9)
MONOCYTES ABSOLUTE: 0.47 K/UL (ref 0.1–0.95)
MONOCYTES RELATIVE PERCENT: 9 % (ref 2–12)
NEUTROPHILS ABSOLUTE: 2.99 K/UL (ref 1.8–7.3)
NEUTROPHILS RELATIVE PERCENT: 59 % (ref 43–80)
PDW BLD-RTO: 13.2 % (ref 11.5–15)
PLATELET # BLD: 167 K/UL (ref 130–450)
PMV BLD AUTO: 11.2 FL (ref 7–12)
RBC # BLD: 3.83 M/UL (ref 3.8–5.8)
WBC # BLD: 5.1 K/UL (ref 4.5–11.5)

## 2024-01-03 NOTE — PROGRESS NOTES
Venipuncture was obtained from right arm. Patient tolerated the procedure without complications or complaints.

## 2024-01-04 LAB
ALBUMIN SERPL-MCNC: 4.3 G/DL (ref 3.5–5.2)
ALP BLD-CCNC: 61 U/L (ref 40–129)
ALT SERPL-CCNC: 27 U/L (ref 0–40)
ANION GAP SERPL CALCULATED.3IONS-SCNC: 15 MMOL/L (ref 7–16)
AST SERPL-CCNC: 30 U/L (ref 0–39)
BILIRUB SERPL-MCNC: 0.4 MG/DL (ref 0–1.2)
BUN BLDV-MCNC: 19 MG/DL (ref 6–23)
CALCIUM SERPL-MCNC: 9.3 MG/DL (ref 8.6–10.2)
CHLORIDE BLD-SCNC: 96 MMOL/L (ref 98–107)
CO2: 26 MMOL/L (ref 22–29)
CREAT SERPL-MCNC: 1.3 MG/DL (ref 0.7–1.2)
GFR SERPL CREATININE-BSD FRML MDRD: 55 ML/MIN/1.73M2
GLUCOSE BLD-MCNC: 215 MG/DL (ref 74–99)
POTASSIUM SERPL-SCNC: 4.4 MMOL/L (ref 3.5–5)
SODIUM BLD-SCNC: 137 MMOL/L (ref 132–146)
TOTAL PROTEIN: 7.4 G/DL (ref 6.4–8.3)

## 2024-01-05 ENCOUNTER — ANESTHESIA EVENT (OUTPATIENT)
Age: 79
End: 2024-01-05
Payer: MEDICARE

## 2024-01-05 ENCOUNTER — HOSPITAL ENCOUNTER (OUTPATIENT)
Age: 79
Setting detail: OBSERVATION
Discharge: HOME | End: 2024-01-06
Attending: STUDENT IN AN ORGANIZED HEALTH CARE EDUCATION/TRAINING PROGRAM | Admitting: STUDENT IN AN ORGANIZED HEALTH CARE EDUCATION/TRAINING PROGRAM
Payer: MEDICARE

## 2024-01-05 ENCOUNTER — ANESTHESIA (OUTPATIENT)
Age: 79
End: 2024-01-05
Payer: MEDICARE

## 2024-01-05 DIAGNOSIS — I48.0 PAROXYSMAL ATRIAL FIBRILLATION (HCC): ICD-10-CM

## 2024-01-05 PROBLEM — Z98.890 S/P ABLATION OF ATRIAL FIBRILLATION: Status: ACTIVE | Noted: 2024-01-05

## 2024-01-05 PROBLEM — I48.91 ATRIAL FIBRILLATION (HCC): Status: ACTIVE | Noted: 2024-01-05

## 2024-01-05 PROBLEM — I48.91 AF (ATRIAL FIBRILLATION) (HCC): Status: ACTIVE | Noted: 2024-01-05

## 2024-01-05 PROBLEM — Z86.79 S/P ABLATION OF ATRIAL FIBRILLATION: Status: ACTIVE | Noted: 2024-01-05

## 2024-01-05 LAB
ACTIVATED CLOTTING TIME, HIGH RANGE: 205 SEC
ACTIVATED CLOTTING TIME, HIGH RANGE: 219 SEC
ACTIVATED CLOTTING TIME, HIGH RANGE: 326 SEC
ACTIVATED CLOTTING TIME, HIGH RANGE: 345 SEC
ACTIVATED CLOTTING TIME, HIGH RANGE: 349 SEC
ACTIVATED CLOTTING TIME, HIGH RANGE: 355 SEC
ACTIVATED CLOTTING TIME, HIGH RANGE: 357 SEC
GLUCOSE BLD-MCNC: 367 MG/DL (ref 74–99)

## 2024-01-05 PROCEDURE — 2500000003 HC RX 250 WO HCPCS: Performed by: STUDENT IN AN ORGANIZED HEALTH CARE EDUCATION/TRAINING PROGRAM

## 2024-01-05 PROCEDURE — 7100000001 HC PACU RECOVERY - ADDTL 15 MIN: Performed by: STUDENT IN AN ORGANIZED HEALTH CARE EDUCATION/TRAINING PROGRAM

## 2024-01-05 PROCEDURE — 93656 COMPRE EP EVAL ABLTJ ATR FIB: CPT | Performed by: STUDENT IN AN ORGANIZED HEALTH CARE EDUCATION/TRAINING PROGRAM

## 2024-01-05 PROCEDURE — C1730 CATH, EP, 19 OR FEW ELECT: HCPCS | Performed by: STUDENT IN AN ORGANIZED HEALTH CARE EDUCATION/TRAINING PROGRAM

## 2024-01-05 PROCEDURE — G0378 HOSPITAL OBSERVATION PER HR: HCPCS

## 2024-01-05 PROCEDURE — C1766 INTRO/SHEATH,STRBLE,NON-PEEL: HCPCS | Performed by: STUDENT IN AN ORGANIZED HEALTH CARE EDUCATION/TRAINING PROGRAM

## 2024-01-05 PROCEDURE — C1732 CATH, EP, DIAG/ABL, 3D/VECT: HCPCS | Performed by: STUDENT IN AN ORGANIZED HEALTH CARE EDUCATION/TRAINING PROGRAM

## 2024-01-05 PROCEDURE — C1759 CATH, INTRA ECHOCARDIOGRAPHY: HCPCS | Performed by: STUDENT IN AN ORGANIZED HEALTH CARE EDUCATION/TRAINING PROGRAM

## 2024-01-05 PROCEDURE — 82962 GLUCOSE BLOOD TEST: CPT

## 2024-01-05 PROCEDURE — 2720000010 HC SURG SUPPLY STERILE: Performed by: STUDENT IN AN ORGANIZED HEALTH CARE EDUCATION/TRAINING PROGRAM

## 2024-01-05 PROCEDURE — 6360000002 HC RX W HCPCS: Performed by: NURSE ANESTHETIST, CERTIFIED REGISTERED

## 2024-01-05 PROCEDURE — 2580000003 HC RX 258: Performed by: NURSE ANESTHETIST, CERTIFIED REGISTERED

## 2024-01-05 PROCEDURE — 2500000003 HC RX 250 WO HCPCS: Performed by: NURSE ANESTHETIST, CERTIFIED REGISTERED

## 2024-01-05 PROCEDURE — 3700000001 HC ADD 15 MINUTES (ANESTHESIA): Performed by: STUDENT IN AN ORGANIZED HEALTH CARE EDUCATION/TRAINING PROGRAM

## 2024-01-05 PROCEDURE — 93005 ELECTROCARDIOGRAM TRACING: CPT | Performed by: STUDENT IN AN ORGANIZED HEALTH CARE EDUCATION/TRAINING PROGRAM

## 2024-01-05 PROCEDURE — 2580000003 HC RX 258: Performed by: STUDENT IN AN ORGANIZED HEALTH CARE EDUCATION/TRAINING PROGRAM

## 2024-01-05 PROCEDURE — 7100000000 HC PACU RECOVERY - FIRST 15 MIN: Performed by: STUDENT IN AN ORGANIZED HEALTH CARE EDUCATION/TRAINING PROGRAM

## 2024-01-05 PROCEDURE — C1769 GUIDE WIRE: HCPCS | Performed by: STUDENT IN AN ORGANIZED HEALTH CARE EDUCATION/TRAINING PROGRAM

## 2024-01-05 PROCEDURE — 85347 COAGULATION TIME ACTIVATED: CPT

## 2024-01-05 PROCEDURE — 2709999900 HC NON-CHARGEABLE SUPPLY: Performed by: STUDENT IN AN ORGANIZED HEALTH CARE EDUCATION/TRAINING PROGRAM

## 2024-01-05 PROCEDURE — 3700000000 HC ANESTHESIA ATTENDED CARE: Performed by: STUDENT IN AN ORGANIZED HEALTH CARE EDUCATION/TRAINING PROGRAM

## 2024-01-05 PROCEDURE — 6370000000 HC RX 637 (ALT 250 FOR IP): Performed by: STUDENT IN AN ORGANIZED HEALTH CARE EDUCATION/TRAINING PROGRAM

## 2024-01-05 PROCEDURE — C1894 INTRO/SHEATH, NON-LASER: HCPCS | Performed by: STUDENT IN AN ORGANIZED HEALTH CARE EDUCATION/TRAINING PROGRAM

## 2024-01-05 PROCEDURE — C1893 INTRO/SHEATH, FIXED,NON-PEEL: HCPCS | Performed by: STUDENT IN AN ORGANIZED HEALTH CARE EDUCATION/TRAINING PROGRAM

## 2024-01-05 PROCEDURE — 6360000002 HC RX W HCPCS: Performed by: STUDENT IN AN ORGANIZED HEALTH CARE EDUCATION/TRAINING PROGRAM

## 2024-01-05 RX ORDER — INSULIN LISPRO 100 [IU]/ML
0-4 INJECTION, SOLUTION INTRAVENOUS; SUBCUTANEOUS
Status: DISCONTINUED | OUTPATIENT
Start: 2024-01-05 | End: 2024-01-06 | Stop reason: HOSPADM

## 2024-01-05 RX ORDER — LIDOCAINE HYDROCHLORIDE 20 MG/ML
INJECTION, SOLUTION INTRAVENOUS PRN
Status: DISCONTINUED | OUTPATIENT
Start: 2024-01-05 | End: 2024-01-05 | Stop reason: SDUPTHER

## 2024-01-05 RX ORDER — LANOLIN ALCOHOL/MO/W.PET/CERES
400 CREAM (GRAM) TOPICAL DAILY
Status: DISCONTINUED | OUTPATIENT
Start: 2024-01-06 | End: 2024-01-06 | Stop reason: HOSPADM

## 2024-01-05 RX ORDER — ALLOPURINOL 100 MG/1
100 TABLET ORAL DAILY
Status: DISCONTINUED | OUTPATIENT
Start: 2024-01-06 | End: 2024-01-06 | Stop reason: HOSPADM

## 2024-01-05 RX ORDER — DEXAMETHASONE SODIUM PHOSPHATE 10 MG/ML
INJECTION INTRAMUSCULAR; INTRAVENOUS PRN
Status: DISCONTINUED | OUTPATIENT
Start: 2024-01-05 | End: 2024-01-05 | Stop reason: SDUPTHER

## 2024-01-05 RX ORDER — ACETAMINOPHEN 325 MG/1
650 TABLET ORAL EVERY 4 HOURS PRN
Status: DISCONTINUED | OUTPATIENT
Start: 2024-01-05 | End: 2024-01-06 | Stop reason: HOSPADM

## 2024-01-05 RX ORDER — ONDANSETRON 2 MG/ML
4 INJECTION INTRAMUSCULAR; INTRAVENOUS
Status: DISCONTINUED | OUTPATIENT
Start: 2024-01-05 | End: 2024-01-05 | Stop reason: HOSPADM

## 2024-01-05 RX ORDER — FINASTERIDE 5 MG/1
5 TABLET, FILM COATED ORAL DAILY
Status: DISCONTINUED | OUTPATIENT
Start: 2024-01-06 | End: 2024-01-06 | Stop reason: HOSPADM

## 2024-01-05 RX ORDER — SODIUM CHLORIDE 0.9 % (FLUSH) 0.9 %
5-40 SYRINGE (ML) INJECTION EVERY 12 HOURS SCHEDULED
Status: DISCONTINUED | OUTPATIENT
Start: 2024-01-05 | End: 2024-01-06 | Stop reason: HOSPADM

## 2024-01-05 RX ORDER — MULTIVITAMIN WITH IRON
1 TABLET ORAL DAILY
Status: DISCONTINUED | OUTPATIENT
Start: 2024-01-05 | End: 2024-01-06 | Stop reason: HOSPADM

## 2024-01-05 RX ORDER — HEPARIN SODIUM 10000 [USP'U]/100ML
INJECTION, SOLUTION INTRAVENOUS CONTINUOUS PRN
Status: DISCONTINUED | OUTPATIENT
Start: 2024-01-05 | End: 2024-01-05 | Stop reason: HOSPADM

## 2024-01-05 RX ORDER — TAMSULOSIN HYDROCHLORIDE 0.4 MG/1
0.4 CAPSULE ORAL DAILY
Status: DISCONTINUED | OUTPATIENT
Start: 2024-01-06 | End: 2024-01-06 | Stop reason: HOSPADM

## 2024-01-05 RX ORDER — INSULIN LISPRO 100 [IU]/ML
0-4 INJECTION, SOLUTION INTRAVENOUS; SUBCUTANEOUS NIGHTLY
Status: DISCONTINUED | OUTPATIENT
Start: 2024-01-05 | End: 2024-01-06 | Stop reason: HOSPADM

## 2024-01-05 RX ORDER — SODIUM CHLORIDE 0.9 % (FLUSH) 0.9 %
5-40 SYRINGE (ML) INJECTION PRN
Status: DISCONTINUED | OUTPATIENT
Start: 2024-01-05 | End: 2024-01-05 | Stop reason: HOSPADM

## 2024-01-05 RX ORDER — SODIUM CHLORIDE 0.9 % (FLUSH) 0.9 %
5-40 SYRINGE (ML) INJECTION PRN
Status: DISCONTINUED | OUTPATIENT
Start: 2024-01-05 | End: 2024-01-06 | Stop reason: HOSPADM

## 2024-01-05 RX ORDER — SUCCINYLCHOLINE/SOD CL,ISO/PF 200MG/10ML
SYRINGE (ML) INTRAVENOUS PRN
Status: DISCONTINUED | OUTPATIENT
Start: 2024-01-05 | End: 2024-01-05 | Stop reason: SDUPTHER

## 2024-01-05 RX ORDER — SODIUM CHLORIDE 0.9 % (FLUSH) 0.9 %
5-40 SYRINGE (ML) INJECTION EVERY 12 HOURS SCHEDULED
Status: DISCONTINUED | OUTPATIENT
Start: 2024-01-05 | End: 2024-01-05 | Stop reason: HOSPADM

## 2024-01-05 RX ORDER — PANTOPRAZOLE SODIUM 40 MG/1
40 TABLET, DELAYED RELEASE ORAL
Status: DISCONTINUED | OUTPATIENT
Start: 2024-01-06 | End: 2024-01-06 | Stop reason: HOSPADM

## 2024-01-05 RX ORDER — SODIUM CHLORIDE 9 MG/ML
INJECTION, SOLUTION INTRAVENOUS PRN
Status: DISCONTINUED | OUTPATIENT
Start: 2024-01-05 | End: 2024-01-06 | Stop reason: HOSPADM

## 2024-01-05 RX ORDER — SODIUM CHLORIDE 9 MG/ML
INJECTION, SOLUTION INTRAVENOUS PRN
Status: DISCONTINUED | OUTPATIENT
Start: 2024-01-05 | End: 2024-01-05 | Stop reason: HOSPADM

## 2024-01-05 RX ORDER — SODIUM CHLORIDE 9 MG/ML
INJECTION, SOLUTION INTRAVENOUS CONTINUOUS PRN
Status: DISCONTINUED | OUTPATIENT
Start: 2024-01-05 | End: 2024-01-05 | Stop reason: SDUPTHER

## 2024-01-05 RX ORDER — AZELASTINE 1 MG/ML
2 SPRAY, METERED NASAL DAILY
Status: DISCONTINUED | OUTPATIENT
Start: 2024-01-05 | End: 2024-01-05

## 2024-01-05 RX ORDER — PROPOFOL 10 MG/ML
INJECTION, EMULSION INTRAVENOUS PRN
Status: DISCONTINUED | OUTPATIENT
Start: 2024-01-05 | End: 2024-01-05 | Stop reason: SDUPTHER

## 2024-01-05 RX ORDER — AMLODIPINE BESYLATE 5 MG/1
5 TABLET ORAL DAILY
Status: DISCONTINUED | OUTPATIENT
Start: 2024-01-06 | End: 2024-01-06 | Stop reason: HOSPADM

## 2024-01-05 RX ORDER — PHENYLEPHRINE HCL IN 0.9% NACL 1 MG/10 ML
SYRINGE (ML) INTRAVENOUS PRN
Status: DISCONTINUED | OUTPATIENT
Start: 2024-01-05 | End: 2024-01-05 | Stop reason: SDUPTHER

## 2024-01-05 RX ORDER — FUROSEMIDE 10 MG/ML
20 INJECTION INTRAMUSCULAR; INTRAVENOUS ONCE
Status: DISCONTINUED | OUTPATIENT
Start: 2024-01-05 | End: 2024-01-06 | Stop reason: HOSPADM

## 2024-01-05 RX ORDER — FENTANYL CITRATE 50 UG/ML
INJECTION, SOLUTION INTRAMUSCULAR; INTRAVENOUS PRN
Status: DISCONTINUED | OUTPATIENT
Start: 2024-01-05 | End: 2024-01-05 | Stop reason: SDUPTHER

## 2024-01-05 RX ORDER — FENTANYL CITRATE 50 UG/ML
25 INJECTION, SOLUTION INTRAMUSCULAR; INTRAVENOUS EVERY 5 MIN PRN
Status: DISCONTINUED | OUTPATIENT
Start: 2024-01-05 | End: 2024-01-05 | Stop reason: HOSPADM

## 2024-01-05 RX ORDER — HEPARIN SODIUM 1000 [USP'U]/ML
INJECTION, SOLUTION INTRAVENOUS; SUBCUTANEOUS PRN
Status: DISCONTINUED | OUTPATIENT
Start: 2024-01-05 | End: 2024-01-05 | Stop reason: SDUPTHER

## 2024-01-05 RX ORDER — CEFAZOLIN SODIUM 1 G/3ML
INJECTION, POWDER, FOR SOLUTION INTRAMUSCULAR; INTRAVENOUS PRN
Status: DISCONTINUED | OUTPATIENT
Start: 2024-01-05 | End: 2024-01-05 | Stop reason: SDUPTHER

## 2024-01-05 RX ORDER — AMIODARONE HYDROCHLORIDE 200 MG/1
200 TABLET ORAL EVERY OTHER DAY
Status: DISCONTINUED | OUTPATIENT
Start: 2024-01-06 | End: 2024-01-06 | Stop reason: HOSPADM

## 2024-01-05 RX ORDER — DEXTROSE MONOHYDRATE 100 MG/ML
INJECTION, SOLUTION INTRAVENOUS CONTINUOUS PRN
Status: DISCONTINUED | OUTPATIENT
Start: 2024-01-05 | End: 2024-01-06 | Stop reason: HOSPADM

## 2024-01-05 RX ORDER — PROTAMINE SULFATE 10 MG/ML
INJECTION, SOLUTION INTRAVENOUS PRN
Status: DISCONTINUED | OUTPATIENT
Start: 2024-01-05 | End: 2024-01-05 | Stop reason: SDUPTHER

## 2024-01-05 RX ADMIN — FENTANYL CITRATE 25 MCG: 50 INJECTION, SOLUTION INTRAMUSCULAR; INTRAVENOUS at 12:50

## 2024-01-05 RX ADMIN — FENTANYL CITRATE 100 MCG: 50 INJECTION, SOLUTION INTRAMUSCULAR; INTRAVENOUS at 12:40

## 2024-01-05 RX ADMIN — Medication 100 MCG: at 13:09

## 2024-01-05 RX ADMIN — HEPARIN SODIUM 10000 UNITS: 1000 INJECTION INTRAVENOUS; SUBCUTANEOUS at 13:28

## 2024-01-05 RX ADMIN — Medication 100 MCG: at 13:06

## 2024-01-05 RX ADMIN — HEPARIN SODIUM 5000 UNITS: 1000 INJECTION INTRAVENOUS; SUBCUTANEOUS at 13:45

## 2024-01-05 RX ADMIN — Medication 100 MCG: at 15:56

## 2024-01-05 RX ADMIN — LIDOCAINE HYDROCHLORIDE 100 MG: 20 INJECTION, SOLUTION INTRAVENOUS at 12:40

## 2024-01-05 RX ADMIN — SODIUM CHLORIDE, PRESERVATIVE FREE 10 ML: 5 INJECTION INTRAVENOUS at 20:12

## 2024-01-05 RX ADMIN — FENTANYL CITRATE 25 MCG: 50 INJECTION, SOLUTION INTRAMUSCULAR; INTRAVENOUS at 15:18

## 2024-01-05 RX ADMIN — SODIUM CHLORIDE: 9 INJECTION, SOLUTION INTRAVENOUS at 12:33

## 2024-01-05 RX ADMIN — CEFAZOLIN 2 G: 1 INJECTION, POWDER, FOR SOLUTION INTRAMUSCULAR; INTRAVENOUS at 13:27

## 2024-01-05 RX ADMIN — PROPOFOL 20 MG: 10 INJECTION, EMULSION INTRAVENOUS at 15:18

## 2024-01-05 RX ADMIN — APIXABAN 5 MG: 5 TABLET, FILM COATED ORAL at 20:11

## 2024-01-05 RX ADMIN — PROTAMINE SULFATE 50 MG: 10 INJECTION, SOLUTION INTRAVENOUS at 16:02

## 2024-01-05 RX ADMIN — SODIUM CHLORIDE, PRESERVATIVE FREE 10 ML: 5 INJECTION INTRAVENOUS at 20:13

## 2024-01-05 RX ADMIN — Medication 100 MCG: at 13:42

## 2024-01-05 RX ADMIN — PHENYLEPHRINE HYDROCHLORIDE 50 MCG/MIN: 10 INJECTION INTRAVENOUS at 13:15

## 2024-01-05 RX ADMIN — DEXAMETHASONE SODIUM PHOSPHATE 10 MG: 10 INJECTION INTRAMUSCULAR; INTRAVENOUS at 12:47

## 2024-01-05 RX ADMIN — Medication 100 MCG: at 13:35

## 2024-01-05 RX ADMIN — PROPOFOL 150 MG: 10 INJECTION, EMULSION INTRAVENOUS at 12:40

## 2024-01-05 RX ADMIN — Medication 100 MCG: at 15:37

## 2024-01-05 RX ADMIN — MULTIVITAMIN TABLET 1 TABLET: TABLET at 20:11

## 2024-01-05 RX ADMIN — INSULIN LISPRO 4 UNITS: 100 INJECTION, SOLUTION INTRAVENOUS; SUBCUTANEOUS at 20:21

## 2024-01-05 RX ADMIN — HEPARIN SODIUM 2000 UNITS: 1000 INJECTION INTRAVENOUS; SUBCUTANEOUS at 14:49

## 2024-01-05 RX ADMIN — HEPARIN SODIUM 1000 UNITS: 1000 INJECTION INTRAVENOUS; SUBCUTANEOUS at 15:04

## 2024-01-05 RX ADMIN — HEPARIN SODIUM 2000 UNITS: 1000 INJECTION INTRAVENOUS; SUBCUTANEOUS at 14:18

## 2024-01-05 RX ADMIN — Medication 100 MG: at 12:40

## 2024-01-05 RX ADMIN — HEPARIN SODIUM 5000 UNITS: 1000 INJECTION INTRAVENOUS; SUBCUTANEOUS at 13:57

## 2024-01-05 ASSESSMENT — PAIN SCALES - GENERAL: PAINLEVEL_OUTOF10: 0

## 2024-01-05 NOTE — DISCHARGE INSTRUCTIONS
[facility] Patient Discharge Instructions    Medications: Resume all prescribed medications. No medication changes at this time.    Gout Diet:  AVOID:  Foods and beverages with high-fructose corn syrup  Foods with sugar (sweetened cereals, bakery goods and candies)  Limit consumption of naturally sweet fruit juices  Saturated fats from red meat, fatty poultry and high-fat dairy products  Foods with high purine levels:  Meat: Liver, kidney and sweetbreads   Seafood: anchovies, shellfish, sardines and tuna  Vegetables: asparagus and spinach  Alcohol, particularly beer and distilled liquors   EAT:   Fruits, vegetables and whole grains, which provide complex carbohydrates  Lean meat and poultry, low-fat dairy and lentils as sources of protein  Vitamin C   Coffee in moderation  Cherries  Hydration: Stay well-hydrated by drinking water.    Follow-Up:   Remote Device Check: Scheduled for 1 week by device clinic.   Dr Quigley office appointment: You have a follow up evaluation with Dr Quigley scheduled for Thursday 2/15/24 at 12:00 pm.   If you need to reschedule this appointment please call the [facility] office; [phone].     Questions/concerns: It is not uncommon for patients to experience brief episodes of palpitations, but please call the [facility] office if you are having frequent symptoms.     Incision Care: Check bilateral groins daily. No soaking in a bathtub, hot tub, or pool for one week. You may shower.  If you find any redness, swelling, drainage, warmth, or have a fever greater than 100 degrees, notify the office immediately at [phone].     Activity: No lifting greater than 5 lbs for 7 days, and no strenuous exercise for 2 weeks.    Driving: No driving or operating heavy machinery for 48 hours. After 48 hours, you may drive if you feel up to it. DO NOT drive until you have stopped taking prescription pain medication.    [facility]:

## 2024-01-05 NOTE — ANESTHESIA PRE PROCEDURE
encounter.     Current Outpatient Medications   Medication Sig Dispense Refill    amiodarone (CORDARONE) 200 MG tablet Take 1 tablet by mouth every 48 hours 90 tablet 1    magnesium oxide (MAG-OX) 400 MG tablet Take 1 tablet by mouth daily 90 tablet 1    azelastine (ASTELIN) 0.1 % nasal spray 2 sprays by Nasal route daily Use in each nostril as directed 120 mL 3    finasteride (PROSCAR) 5 MG tablet Take 1 tablet by mouth daily      amLODIPine (NORVASC) 5 MG tablet Take 1 tablet by mouth daily 90 tablet 3    tamsulosin (FLOMAX) 0.4 MG capsule Take 1 capsule by mouth daily 30 capsule 0    allopurinol (ZYLOPRIM) 100 MG tablet allopurinol 100 mg tablet   Take 2 tablets every day by oral route.      apixaban (ELIQUIS) 5 MG TABS tablet Take 1 tablet by mouth 2 times daily 60 tablet 0    Multiple Vitamins-Minerals (CENTRUM ULTRA MENS) TABS Take 1 tablet by mouth daily       omeprazole (PRILOSEC) 20 MG capsule Take 1 capsule by mouth daily      metFORMIN (GLUCOPHAGE) 1000 MG tablet Take 1 tablet by mouth 2 times daily (with meals)         Allergies:  No Known Allergies    Problem List:    Patient Active Problem List   Diagnosis Code    Pneumonia J18.9    Type II diabetes mellitus with HbA1C goal to be determined ([facility]) E11.9    Essential hypertension I10    BPH (benign prostatic hyperplasia) N40.0    Prostatitis N41.9    PAF (paroxysmal atrial fibrillation) ([facility]) I48.0    Class 1 obesity due to excess calories with serious comorbidity and body mass index (BMI) of 32.0 to 32.9 in adult E66.09, Z68.32    AV block, Mobitz 1 I44.1    Hyperlipidemia E78.5    Altered mental status R41.82    Pacemaker Z95.0    AV block, 2nd degree I44.1    Acute pulmonary edema (HCC) J81.0    Hematoma of pacemaker pulse generator site I97.638    Facial cellulitis L03.211    Post-op pain G89.18       Past Medical History:        Diagnosis Date    Arthritis     Atrial fibrillation (HCC)     Alonzo's esophagus     BPH (benign prostatic hypertrophy)

## 2024-01-05 NOTE — H&P
[facility] CARDIOLOGY  CARDIAC ELECTROPHYSIOLOGY DEPARTMENT/DIVISION OF CARDIOLOGY  H&P Report  PATIENT: Manny Ceballos  MEDICAL RECORD NUMBER: [id number]  DATE OF SERVICE:  1/5/2024  ATTENDING ELECTROPHYSIOLOGIST:  Rusty Quigley D.O.  REFERRING PHYSICIAN: Rusty Quigley DO and Rustam Pastrana MD  CHIEF COMPLAINT: PAF    HPI: Manny Ceballos is a 78 y.o. 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  He is managed by Dr. Urbina with amiodarone 200 mg daily, amlodipine 5 mg daily, apixaban 5 mg twice daily, and PPI.  In 2018, patient was diagnosed with nonvalvular paroxysmal AF, which was treated with OAC.  Around this time, he was noted to have first-degree AV block (300 ms).  In 2020, patient was noted to have episodes of 2* AV block.  In 2021, CO interval had progressed to > 300 ms.  In 4/2023, he was referred to my office. He complained of dyspnea on exertion as well as fatigue. I recommended BSCI dc PPM for 2* AV block and pseudo-pacemaker syndrome, which was implanted in 5/2023. In 6/2023, he had AF recurrence, which was treated with DCCV in 8/2023. In late 9/2023, he had recurrence of AF and started on amiodarone then referred for DCCV.  In 11/2023, DCCV restored sinus. He complained of tremors, LE pain, and dizziness with 200 mg BID dosing, which improved with reduction to 200 mg daily.  In 12/2023, he desired AF ablation with plan to stop amiodarone after 3 month blanking period. At that time, amiodarone reduced to 200 mg QOD. He presents today, 1/5/24; for outpatient AF ablation. His PPM is enrolled in remote monitoring, but no remotes since office visit in 12/2023. He denies any other complaints at this time. He is compliant with OAC > 3 weeks and last dose was yesterday evening.    Prior cardiac  symptomatic with 200 mg BID amio, but improved with reduction to 200 mg daily and no recurrence of AF. In 12/2023, amiodarone reduced to 200 mg QOD and referred for AF ablation with plan to stop amiodarone after 3 month blanking period. While on amiodarone, recommend monitoring of TFTs and LFTs every 6 months, as well as annual CXR and eye exam. Patient desires AF ablation. He is compliant with OAC > 3 weeks and last dose was yesterday evening.  - Modifiable risk factors:  -- Obesity: BMI goal less than 27.  Recommend diet and exercise.  -- DORCAS: Recommend compliance with CPAP.  -- HTN: BP goal less than 130/80.  -- EtOH: Continue to avoid.  - Follow-up:  --- Remote pacemaker interrogation in 1 week.  --- EP provider appointment 1 month after ablation.    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   - Continue remote monitoring of pacemaker every 91 days.    Moderate AS  - Management per Dr Urbina.    Thank you for allowing me to participate in your patient's care.  Please call me if there are any questions.      Rusty Quigley D.O.  Cardiac Electrophysiology  [facility]  [facility]    CC: Rustam Pastrana MD

## 2024-01-06 VITALS
BODY MASS INDEX: 30.88 KG/M2 | HEART RATE: 68 BPM | WEIGHT: 228 LBS | DIASTOLIC BLOOD PRESSURE: 69 MMHG | TEMPERATURE: 98.2 F | HEIGHT: 72 IN | OXYGEN SATURATION: 95 % | RESPIRATION RATE: 18 BRPM | SYSTOLIC BLOOD PRESSURE: 125 MMHG

## 2024-01-06 LAB
ANION GAP SERPL CALCULATED.3IONS-SCNC: 16 MMOL/L (ref 7–16)
BUN SERPL-MCNC: 20 MG/DL (ref 6–23)
CALCIUM SERPL-MCNC: 8.9 MG/DL (ref 8.6–10.2)
CHLORIDE SERPL-SCNC: 101 MMOL/L (ref 98–107)
CO2 SERPL-SCNC: 19 MMOL/L (ref 22–29)
CREAT SERPL-MCNC: 1.2 MG/DL (ref 0.7–1.2)
ECHO BSA: 2.29 M2
EKG ATRIAL RATE: 80 BPM
EKG P AXIS: 50 DEGREES
EKG P-R INTERVAL: 216 MS
EKG Q-T INTERVAL: 446 MS
EKG QRS DURATION: 160 MS
EKG QTC CALCULATION (BAZETT): 514 MS
EKG R AXIS: 45 DEGREES
EKG T AXIS: -139 DEGREES
EKG VENTRICULAR RATE: 80 BPM
ERYTHROCYTE [DISTWIDTH] IN BLOOD BY AUTOMATED COUNT: 12.7 % (ref 11.5–15)
GFR SERPL CREATININE-BSD FRML MDRD: 60 ML/MIN/1.73M2
GLUCOSE BLD-MCNC: 210 MG/DL (ref 74–99)
GLUCOSE BLD-MCNC: 272 MG/DL (ref 74–99)
GLUCOSE BLD-MCNC: 272 MG/DL (ref 74–99)
GLUCOSE SERPL-MCNC: 211 MG/DL (ref 74–99)
HCT VFR BLD AUTO: 34.8 % (ref 37–54)
HGB BLD-MCNC: 11.6 G/DL (ref 12.5–16.5)
MAGNESIUM SERPL-MCNC: 1.8 MG/DL (ref 1.6–2.6)
MCH RBC QN AUTO: 33.7 PG (ref 26–35)
MCHC RBC AUTO-ENTMCNC: 33.3 G/DL (ref 32–34.5)
MCV RBC AUTO: 101.2 FL (ref 80–99.9)
PLATELET # BLD AUTO: 142 K/UL (ref 130–450)
PMV BLD AUTO: 10.8 FL (ref 7–12)
POTASSIUM SERPL-SCNC: 4.5 MMOL/L (ref 3.5–5)
RBC # BLD AUTO: 3.44 M/UL (ref 3.8–5.8)
SODIUM SERPL-SCNC: 136 MMOL/L (ref 132–146)
WBC OTHER # BLD: 6.2 K/UL (ref 4.5–11.5)

## 2024-01-06 PROCEDURE — 83735 ASSAY OF MAGNESIUM: CPT

## 2024-01-06 PROCEDURE — 85027 COMPLETE CBC AUTOMATED: CPT

## 2024-01-06 PROCEDURE — 82962 GLUCOSE BLOOD TEST: CPT

## 2024-01-06 PROCEDURE — 99024 POSTOP FOLLOW-UP VISIT: CPT | Performed by: INTERNAL MEDICINE

## 2024-01-06 PROCEDURE — G0378 HOSPITAL OBSERVATION PER HR: HCPCS

## 2024-01-06 PROCEDURE — 80048 BASIC METABOLIC PNL TOTAL CA: CPT

## 2024-01-06 PROCEDURE — 36415 COLL VENOUS BLD VENIPUNCTURE: CPT

## 2024-01-06 PROCEDURE — 2580000003 HC RX 258: Performed by: STUDENT IN AN ORGANIZED HEALTH CARE EDUCATION/TRAINING PROGRAM

## 2024-01-06 PROCEDURE — 93010 ELECTROCARDIOGRAM REPORT: CPT | Performed by: INTERNAL MEDICINE

## 2024-01-06 PROCEDURE — 6370000000 HC RX 637 (ALT 250 FOR IP): Performed by: STUDENT IN AN ORGANIZED HEALTH CARE EDUCATION/TRAINING PROGRAM

## 2024-01-06 RX ADMIN — TAMSULOSIN HYDROCHLORIDE 0.4 MG: 0.4 CAPSULE ORAL at 09:00

## 2024-01-06 RX ADMIN — ALLOPURINOL 100 MG: 100 TABLET ORAL at 09:00

## 2024-01-06 RX ADMIN — AMLODIPINE BESYLATE 5 MG: 5 TABLET ORAL at 09:00

## 2024-01-06 RX ADMIN — PANTOPRAZOLE SODIUM 40 MG: 40 TABLET, DELAYED RELEASE ORAL at 06:14

## 2024-01-06 RX ADMIN — AMIODARONE HYDROCHLORIDE 200 MG: 200 TABLET ORAL at 09:01

## 2024-01-06 RX ADMIN — APIXABAN 5 MG: 5 TABLET, FILM COATED ORAL at 09:01

## 2024-01-06 RX ADMIN — INSULIN LISPRO 1 UNITS: 100 INJECTION, SOLUTION INTRAVENOUS; SUBCUTANEOUS at 08:59

## 2024-01-06 RX ADMIN — MULTIVITAMIN TABLET 1 TABLET: TABLET at 09:00

## 2024-01-06 RX ADMIN — FINASTERIDE 5 MG: 5 TABLET, FILM COATED ORAL at 09:00

## 2024-01-06 RX ADMIN — INSULIN LISPRO 2 UNITS: 100 INJECTION, SOLUTION INTRAVENOUS; SUBCUTANEOUS at 13:39

## 2024-01-06 RX ADMIN — SODIUM CHLORIDE, PRESERVATIVE FREE 10 ML: 5 INJECTION INTRAVENOUS at 09:05

## 2024-01-06 RX ADMIN — Medication 400 MG: at 09:00

## 2024-01-06 ASSESSMENT — PAIN SCALES - GENERAL
PAINLEVEL_OUTOF10: 0
PAINLEVEL_OUTOF10: 0

## 2024-01-06 NOTE — PROGRESS NOTES
Js stopcocks removed. Held pressure to sites. No bleeding or hematomas. No drainage. Dressings applied per Dr order.

## 2024-01-06 NOTE — PROGRESS NOTES
4 Eyes Skin Assessment     NAME:  Manny Ceballos  YOB: 1945  MEDICAL RECORD NUMBER:  [id number]    The patient is being assessed for  Admission    I agree that at least one RN has performed a thorough Head to Toe Skin Assessment on the patient. ALL assessment sites listed below have been assessed.      Areas assessed by both nurses:    Head, Face, Ears, Shoulders, Back, Chest, Arms, Elbows, Hands, Sacrum. Buttock, Coccyx, Ischium, Legs. Feet and Heels, and Under Medical Devices         Does the Patient have a Wound? No noted wound(s)       Ronak Prevention initiated by RN: No  Wound Care Orders initiated by RN: No    Pressure Injury (Stage 3,4, Unstageable, DTI, NWPT, and Complex wounds) if present, place Wound referral order by RN under : No    New Ostomies, if present place, Ostomy referral order under : No     Nurse 1 eSignature: Electronically signed by Jerica Downs RN on 1/5/24 at 8:20 PM EST    **SHARE this note so that the co-signing nurse can place an eSignature**    Nurse 2 eSignature: Electronically signed by Taniya Hernández RN on 1/5/24 at 9:06 PM EST   Complex Repair And O-L Flap Text: The defect edges were debeveled with a #15 scalpel blade.  The primary defect was closed partially with a complex linear closure.  Given the location of the remaining defect, shape of the defect and the proximity to free margins an O-L flap was deemed most appropriate for complete closure of the defect.  Using a sterile surgical marker, an appropriate flap was drawn incorporating the defect and placing the expected incisions within the relaxed skin tension lines where possible.    The area thus outlined was incised deep to adipose tissue with a #15 scalpel blade.  The skin margins were undermined to an appropriate distance in all directions utilizing iris scissors.

## 2024-01-06 NOTE — PLAN OF CARE
Problem: Chronic Conditions and Co-morbidities  Goal: Patient's chronic conditions and co-morbidity symptoms are monitored and maintained or improved  Outcome: Progressing     Problem: Discharge Planning  Goal: Discharge to home or other facility with appropriate resources  Outcome: Progressing     Problem: ABCDS Injury Assessment  Goal: Absence of physical injury  Outcome: Progressing

## 2024-01-06 NOTE — DISCHARGE SUMMARY
[facility] Cardiac Electrophysiology Discharge Summary    Manny Ceballos  1945    78 y.o.  male  PCP: Rustam Pastrana MD    Admission Date:1/5/2024      Discharge Date:  1/6/24    Patient Active Problem List   Diagnosis    Pneumonia    Type II diabetes mellitus with HbA1C goal to be determined ([facility])    Essential hypertension    BPH (benign prostatic hyperplasia)    Prostatitis    PAF (paroxysmal atrial fibrillation) ([facility])    Class 1 obesity due to excess calories with serious comorbidity and body mass index (BMI) of 32.0 to 32.9 in adult    AV block, Mobitz 1    Hyperlipidemia    Altered mental status    Pacemaker    AV block, 2nd degree    Acute pulmonary edema ([facility])    Hematoma of pacemaker pulse generator site    Facial cellulitis    Post-op pain    S/P ablation of atrial fibrillation    AF (atrial fibrillation) ([facility])    Atrial fibrillation ([facility])          Medication List        ASK your doctor about these medications      allopurinol 100 MG tablet  Commonly known as: ZYLOPRIM     amiodarone 200 MG tablet  Commonly known as: CORDARONE  Take 1 tablet by mouth every 48 hours     amLODIPine 5 MG tablet  Commonly known as: NORVASC  Take 1 tablet by mouth daily     apixaban 5 MG Tabs tablet  Commonly known as: Eliquis  Take 1 tablet by mouth 2 times daily     azelastine 0.1 % nasal spray  Commonly known as: ASTELIN  2 sprays by Nasal route daily Use in each nostril as directed     Centrum Ultra Mens Tabs     finasteride 5 MG tablet  Commonly known as: PROSCAR     magnesium oxide 400 MG tablet  Commonly known as: MAG-OX  Take 1 tablet by mouth daily     metFORMIN 1000 MG tablet  Commonly known as: GLUCOPHAGE     omeprazole 20 MG delayed release capsule  Commonly known as: PRILOSEC     tamsulosin 0.4 MG capsule  Commonly known as: FLOMAX  Take 1 capsule by mouth daily              Hospital Course: Atrial fibrillation ablation 1/5/24 by Dr Quigley. Admitted overnight for observation.  Patient did well through the night and

## 2024-01-06 NOTE — PLAN OF CARE
Problem: Metabolic/Fluid and Electrolytes - Adult  Goal: Hemodynamic stability and optimal renal function maintained  Outcome: Progressing

## 2024-01-06 NOTE — PROGRESS NOTES
Pharmacy Note    This patient was ordered Astelin. Per the Pharmacy & Therapeutics Committee, this medication is non-formulary and not stocked by pharmacy for the reason indicated below. The medication can be reordered at discharge.     Medications in which risks outweigh benefits during hospitalization:           -  oral bisphosphonates         -  raloxifene (Evista)        -  SGLT2 inhibitors (ordered in the hospital for an indication other than heart failure or chronic kidney disease)    Medications that lack necessity during an acute hospital stay:        -  nasal antihistamines        -  nasal ipratropium 0.03% and 0.06%        -  nasal miacalcin        -  acyclovir topical cream/ointment orders for herpes labialis (cold sores)

## 2024-01-06 NOTE — ANESTHESIA POSTPROCEDURE EVALUATION
Department of Anesthesiology  Postprocedure Note    Patient: Manny Ceballos  MRN: [id number]  YOB: 1945  Date of evaluation: 1/5/2024    Procedure Summary       Date: 01/05/24 Room / Location: [facility]ATH [facility]    Anesthesia Start: 1227 Anesthesia Stop: 1633    Procedure: Ablation A-fib w complete ep study Diagnosis:       Paroxysmal atrial fibrillation (HCC)      (Paroxysmal atrial fibrillation (HCC) [I48.0])    Providers: Rusty Quigley DO Responsible Provider: Kate Huizar MD    Anesthesia Type: general ASA Status: 4            Anesthesia Type: No value filed.    Roya Phase I: Roya Score: 9    Roya Phase II:      Anesthesia Post Evaluation    Patient location during evaluation: PACU  Patient participation: complete - patient participated  Level of consciousness: awake  Airway patency: patent  Nausea & Vomiting: no nausea and no vomiting  Cardiovascular status: hemodynamically stable  Respiratory status: acceptable  Hydration status: euvolemic  Pain management: adequate        No notable events documented.

## 2024-01-06 NOTE — PLAN OF CARE
Problem: Chronic Conditions and Co-morbidities  Goal: Patient's chronic conditions and co-morbidity symptoms are monitored and maintained or improved  Outcome: Completed  Flowsheets (Taken 1/6/2024 0750)  Care Plan - Patient's Chronic Conditions and Co-Morbidity Symptoms are Monitored and Maintained or Improved: Monitor and assess patient's chronic conditions and comorbid symptoms for stability, deterioration, or improvement     Problem: Discharge Planning  Goal: Discharge to home or other facility with appropriate resources  Outcome: Completed  Flowsheets (Taken 1/6/2024 0750)  Discharge to home or other facility with appropriate resources: Identify barriers to discharge with patient and caregiver     Problem: ABCDS Injury Assessment  Goal: Absence of physical injury  Outcome: Completed     Problem: Safety - Adult  Goal: Free from fall injury  Outcome: Completed     Problem: Metabolic/Fluid and Electrolytes - Adult  Goal: Hemodynamic stability and optimal renal function maintained  Outcome: Completed  Flowsheets (Taken 1/6/2024 0750)  Hemodynamic stability and optimal renal function maintained:   Monitor labs and assess for signs and symptoms of volume excess or deficit   Monitor intake, output and patient weight  Goal: Glucose maintained within prescribed range  Outcome: Completed  Flowsheets (Taken 1/6/2024 0750)  Glucose maintained within prescribed range: Monitor blood glucose as ordered     Problem: Pain  Goal: Verbalizes/displays adequate comfort level or baseline comfort level  Outcome: Completed

## 2024-01-06 NOTE — PROGRESS NOTES
Heart monitor removed, cleaned and placed in nurses station.  Discharge instructions provided to patient and wife.  Discussed following up with Dr. Quigley on 2/15/2024. If not able to keep appt to call and reschedule.   Discussed groin site care including cleaning with soap and water and covering with bandaid to keep clean. Avoiding strenuous activity for the next 2 weeks and monitoring sites daily for signs of infection.   Pt had no further questions.

## 2024-01-07 LAB
EKG ATRIAL RATE: 67 BPM
EKG P AXIS: 9 DEGREES
EKG P-R INTERVAL: 176 MS
EKG Q-T INTERVAL: 472 MS
EKG QRS DURATION: 168 MS
EKG QTC CALCULATION (BAZETT): 498 MS
EKG R AXIS: 38 DEGREES
EKG T AXIS: -162 DEGREES
EKG VENTRICULAR RATE: 67 BPM

## 2024-01-07 PROCEDURE — 93010 ELECTROCARDIOGRAM REPORT: CPT | Performed by: INTERNAL MEDICINE

## 2024-01-08 LAB — ECHO BSA: 2.29 M2

## 2024-01-12 ENCOUNTER — TELEPHONE (OUTPATIENT)
Age: 79
End: 2024-01-12

## 2024-01-12 NOTE — TELEPHONE ENCOUNTER
I called Mr. Ceballos to see how he is doing since his Ablation on 1/5/24.  I spoke to his wife who states he's doing well and denies chest pain, SOB, palpitations, bleeding, drainage, or swelling from bilat groin incisions.  I reminded him of his follow up appt w/  on 2/15/24 at 12:00 pm She offers no complaints & was very thankful for the call.

## 2024-02-14 ENCOUNTER — OFFICE VISIT (OUTPATIENT)
Dept: ENT CLINIC | Age: 79
End: 2024-02-14
Payer: MEDICARE

## 2024-02-14 VITALS
TEMPERATURE: 97.8 F | WEIGHT: 230 LBS | HEIGHT: 72 IN | OXYGEN SATURATION: 95 % | DIASTOLIC BLOOD PRESSURE: 84 MMHG | BODY MASS INDEX: 31.15 KG/M2 | SYSTOLIC BLOOD PRESSURE: 166 MMHG | HEART RATE: 73 BPM

## 2024-02-14 DIAGNOSIS — J34.89 NASAL OBSTRUCTION: Primary | ICD-10-CM

## 2024-02-14 DIAGNOSIS — J30.0 VASOMOTOR RHINITIS: ICD-10-CM

## 2024-02-14 DIAGNOSIS — R09.81 NASAL CONGESTION: ICD-10-CM

## 2024-02-14 DIAGNOSIS — J34.3 NASAL TURBINATE HYPERTROPHY: ICD-10-CM

## 2024-02-14 PROCEDURE — G8417 CALC BMI ABV UP PARAM F/U: HCPCS | Performed by: OTOLARYNGOLOGY

## 2024-02-14 PROCEDURE — G8484 FLU IMMUNIZE NO ADMIN: HCPCS | Performed by: OTOLARYNGOLOGY

## 2024-02-14 PROCEDURE — 3079F DIAST BP 80-89 MM HG: CPT | Performed by: OTOLARYNGOLOGY

## 2024-02-14 PROCEDURE — 3077F SYST BP >= 140 MM HG: CPT | Performed by: OTOLARYNGOLOGY

## 2024-02-14 PROCEDURE — 99213 OFFICE O/P EST LOW 20 MIN: CPT | Performed by: OTOLARYNGOLOGY

## 2024-02-14 PROCEDURE — G8427 DOCREV CUR MEDS BY ELIG CLIN: HCPCS | Performed by: OTOLARYNGOLOGY

## 2024-02-14 PROCEDURE — 1123F ACP DISCUSS/DSCN MKR DOCD: CPT | Performed by: OTOLARYNGOLOGY

## 2024-02-14 PROCEDURE — 1036F TOBACCO NON-USER: CPT | Performed by: OTOLARYNGOLOGY

## 2024-02-14 RX ORDER — IPRATROPIUM BROMIDE 42 UG/1
2 SPRAY, METERED NASAL 3 TIMES DAILY
Qty: 15 ML | Refills: 3 | Status: SHIPPED | OUTPATIENT
Start: 2024-02-14

## 2024-02-14 NOTE — PROGRESS NOTES
Psychiatric:         Mood and Affect: Mood normal.         Behavior: Behavior normal.               Assessment:       Diagnosis Orders   1. Nasal obstruction        2. Nasal congestion        3. Nasal turbinate hypertrophy        4. Vasomotor rhinitis                     Plan:     Patient with persistent nasal congestion after I&D septal abscess. He does have hx of seasonal allergies as well. No evidence of abscess recurrence today. No evidence of nasal valve collapse. He prefers to use nasal saline irrigation and breath right strips over compliancy with nasal sprays. He notes his rhinorrhea is worse when he eats, we will try patient on ipratropium nasal spray 3 times daily.     Follow up in 2 months        Manny Ceballos  1945    I have discussed the case, including pertinent history and exam findings with the resident. I have seen and examined the patient and the key elements of the encounter have been performed by me. I agree with the assessment, plan and orders as documented by the  resident              Remainder of medical problems as per  resident note.    Patient seen and examined. Agree with above exam, assessment and plan.      Electronically signed by Daniel Fajardo DO on 2/19/24 at 10:08 AM EST  z

## 2024-02-14 NOTE — PROGRESS NOTES
[facility] CARDIOLOGY  CARDIAC ELECTROPHYSIOLOGY DEPARTMENT/DIVISION OF CARDIOLOGY  Outpatient Progress Report  PATIENT: Manny Ceballos  MEDICAL RECORD NUMBER: [id number]  DATE OF SERVICE:  2/15/2024  ATTENDING ELECTROPHYSIOLOGIST:  Rusty Quigley D.O.  REFERRING PHYSICIAN: No ref. provider found and Rustam Pastrana MD  CHIEF COMPLAINT: PAF    HPI: Manny Ceballos is a 78 y.o. 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  He is managed by Dr. Urbina with amiodarone 200 mg QOD, amlodipine 5 mg daily, apixaban 5 mg twice daily, and PPI.  In 2018, patient was diagnosed with nonvalvular paroxysmal AF, which was treated with OAC.  Around this time, he was noted to have first-degree AV block (300 ms).  In 2020, patient was noted to have episodes of 2* AV block.  In 2021, [place] interval had progressed to > 300 ms.  In 4/2023, he was referred to my office. He complained of dyspnea on exertion as well as fatigue. I recommended BSCI dc PPM for 2* AV block and pseudo-pacemaker syndrome, which was implanted in 5/2023. In 6/2023, he had AF recurrence, which was treated with DCCV in 8/2023. In late 9/2023, he had recurrence of AF and started on amiodarone then referred for DCCV.  In 11/2023, DCCV restored sinus. He complained of tremors, LE pain, and dizziness with 200 mg BID dosing, which improved with reduction to 200 mg daily. In 1/2024, he had RFA PVI. At the time of ablation, he had multiple epicardial connections in the left PV amber, which were all ablated. Additionally, patient noted to have very large eustachian ridge on ICE and extensive scar/dense fibrosis throughout the LA chamber on 3D electro-anatomic map. He presents today, 2/15/24; for outpatient follow-up with my office. His PPM is enrolled in remote

## 2024-02-15 ENCOUNTER — OFFICE VISIT (OUTPATIENT)
Dept: NON INVASIVE DIAGNOSTICS | Age: 79
End: 2024-02-15
Payer: MEDICARE

## 2024-02-15 VITALS
RESPIRATION RATE: 16 BRPM | SYSTOLIC BLOOD PRESSURE: 144 MMHG | BODY MASS INDEX: 31.56 KG/M2 | HEIGHT: 72 IN | WEIGHT: 233 LBS | DIASTOLIC BLOOD PRESSURE: 88 MMHG | HEART RATE: 74 BPM

## 2024-02-15 DIAGNOSIS — Z86.79 S/P ABLATION OF ATRIAL FIBRILLATION: Primary | ICD-10-CM

## 2024-02-15 DIAGNOSIS — Z98.890 S/P ABLATION OF ATRIAL FIBRILLATION: Primary | ICD-10-CM

## 2024-02-15 PROCEDURE — 3079F DIAST BP 80-89 MM HG: CPT | Performed by: STUDENT IN AN ORGANIZED HEALTH CARE EDUCATION/TRAINING PROGRAM

## 2024-02-15 PROCEDURE — 1036F TOBACCO NON-USER: CPT | Performed by: STUDENT IN AN ORGANIZED HEALTH CARE EDUCATION/TRAINING PROGRAM

## 2024-02-15 PROCEDURE — 93000 ELECTROCARDIOGRAM COMPLETE: CPT | Performed by: STUDENT IN AN ORGANIZED HEALTH CARE EDUCATION/TRAINING PROGRAM

## 2024-02-15 PROCEDURE — 1123F ACP DISCUSS/DSCN MKR DOCD: CPT | Performed by: STUDENT IN AN ORGANIZED HEALTH CARE EDUCATION/TRAINING PROGRAM

## 2024-02-15 PROCEDURE — 3077F SYST BP >= 140 MM HG: CPT | Performed by: STUDENT IN AN ORGANIZED HEALTH CARE EDUCATION/TRAINING PROGRAM

## 2024-02-15 PROCEDURE — G8417 CALC BMI ABV UP PARAM F/U: HCPCS | Performed by: STUDENT IN AN ORGANIZED HEALTH CARE EDUCATION/TRAINING PROGRAM

## 2024-02-15 PROCEDURE — G8427 DOCREV CUR MEDS BY ELIG CLIN: HCPCS | Performed by: STUDENT IN AN ORGANIZED HEALTH CARE EDUCATION/TRAINING PROGRAM

## 2024-02-15 PROCEDURE — 99214 OFFICE O/P EST MOD 30 MIN: CPT | Performed by: STUDENT IN AN ORGANIZED HEALTH CARE EDUCATION/TRAINING PROGRAM

## 2024-02-15 PROCEDURE — G8484 FLU IMMUNIZE NO ADMIN: HCPCS | Performed by: STUDENT IN AN ORGANIZED HEALTH CARE EDUCATION/TRAINING PROGRAM

## 2024-02-15 NOTE — PATIENT INSTRUCTIONS
No medication changes at this time.  Continue remote monitoring every 91 days.  Follow-up with this office in

## 2024-02-20 LAB — ECHO BSA: 2.3 M2

## 2024-03-06 LAB — DIABETIC RETINOPATHY: POSITIVE

## 2024-03-18 ENCOUNTER — OFFICE VISIT (OUTPATIENT)
Dept: CARDIOLOGY CLINIC | Age: 79
End: 2024-03-18
Payer: MEDICARE

## 2024-03-18 VITALS
DIASTOLIC BLOOD PRESSURE: 80 MMHG | SYSTOLIC BLOOD PRESSURE: 138 MMHG | WEIGHT: 232.4 LBS | OXYGEN SATURATION: 96 % | BODY MASS INDEX: 31.48 KG/M2 | HEIGHT: 72 IN

## 2024-03-18 DIAGNOSIS — I48.0 PAF (PAROXYSMAL ATRIAL FIBRILLATION) (HCC): Primary | ICD-10-CM

## 2024-03-18 DIAGNOSIS — I35.0 NONRHEUMATIC AORTIC VALVE STENOSIS: ICD-10-CM

## 2024-03-18 DIAGNOSIS — Z86.79 S/P ABLATION OF ATRIAL FIBRILLATION: ICD-10-CM

## 2024-03-18 DIAGNOSIS — R06.02 SHORTNESS OF BREATH: ICD-10-CM

## 2024-03-18 DIAGNOSIS — I10 ESSENTIAL HYPERTENSION: ICD-10-CM

## 2024-03-18 DIAGNOSIS — Z98.890 S/P ABLATION OF ATRIAL FIBRILLATION: ICD-10-CM

## 2024-03-18 DIAGNOSIS — Z95.0 STATUS POST PLACEMENT OF CARDIAC PACEMAKER: ICD-10-CM

## 2024-03-18 PROCEDURE — G8417 CALC BMI ABV UP PARAM F/U: HCPCS | Performed by: INTERNAL MEDICINE

## 2024-03-18 PROCEDURE — 3075F SYST BP GE 130 - 139MM HG: CPT | Performed by: INTERNAL MEDICINE

## 2024-03-18 PROCEDURE — G8427 DOCREV CUR MEDS BY ELIG CLIN: HCPCS | Performed by: INTERNAL MEDICINE

## 2024-03-18 PROCEDURE — 99214 OFFICE O/P EST MOD 30 MIN: CPT | Performed by: INTERNAL MEDICINE

## 2024-03-18 PROCEDURE — 93000 ELECTROCARDIOGRAM COMPLETE: CPT | Performed by: INTERNAL MEDICINE

## 2024-03-18 PROCEDURE — 1123F ACP DISCUSS/DSCN MKR DOCD: CPT | Performed by: INTERNAL MEDICINE

## 2024-03-18 PROCEDURE — G8484 FLU IMMUNIZE NO ADMIN: HCPCS | Performed by: INTERNAL MEDICINE

## 2024-03-18 PROCEDURE — 1036F TOBACCO NON-USER: CPT | Performed by: INTERNAL MEDICINE

## 2024-03-18 PROCEDURE — 3079F DIAST BP 80-89 MM HG: CPT | Performed by: INTERNAL MEDICINE

## 2024-03-18 RX ORDER — REGADENOSON 0.08 MG/ML
0.4 INJECTION, SOLUTION INTRAVENOUS
Status: CANCELLED | OUTPATIENT
Start: 2024-03-18

## 2024-03-18 NOTE — PROGRESS NOTES
focal neurologic deficit.   Psych:   Alert, good mood and effect.     REVIEW OF DIAGNOSTIC TESTS:        Electrocardiogram: Reviewed      Labs:  Reviewed as available     DCCV - 11/16/23  s/p A Fib ablation - 1/5/2024     Echo 5/16/2023   Summary   Ejection fraction is visually estimated at 60%.   No regional wall motion abnormalities seen.   Severe left ventricular concentric hypertrophy noted.   Normal right ventricle structure and function.   The aortic valve is trileaflet.   Moderate aortic stenosis is present.   The aortic valve area is 1.1 cm2 with a maximum gradient of 44 mmHg and a   mean gradient of 26 mmHg.   Physiologic and/or trace aortic regurgitation is noted.   Physiologic and/or trace mitral regurgitation is present.   Mild tricuspid regurgitation.   RVSP is 43 mmHg.   No evidence for hemodynamically significant pericardial effusion.      Stress test 3/2018  Impression:    1. Exercise EKG was negative.   2. The patient experienced no chest pain with exercise.   3. Hernandez treadmill score was 7 implying low risk.    4. There was an appropriate BP and heart rate response during   exercise and recovery.   5. Exercise capacity was average.    6. Low risk general exercise treadmill test.   7. No prior studies available for comparison.      ASSESSMENT / PLAN:    Manny was seen today for atrial fibrillation, valvular heart disease, hypertension, diabetes, shortness of breath and dizziness.    Diagnoses and all orders for this visit:    PAF (paroxysmal atrial fibrillation) ([facility]) - Dx 2/2018 - [id number] VASc score 4, On Eliquis, s/p DCCV 8/8/2023. S/p DCCV 11/2023; s/p PVI 1/5/2024  -     EKG 12 Lead  -     Nuclear REGADENOSON Stress Test; Future  -     Beta Blocker Management Prior to Cardiac Stress Test; Standing  -     regadenoson (LEXISCAN) injection 0.4 mg    Shortness of breath  -     Nuclear REGADENOSON Stress Test; Future  -     Beta Blocker Management Prior to Cardiac Stress Test; Standing  -

## 2024-03-22 ENCOUNTER — TELEPHONE (OUTPATIENT)
Dept: CARDIOLOGY | Age: 79
End: 2024-03-22

## 2024-03-22 NOTE — TELEPHONE ENCOUNTER
Left message on voice mail to remind patient of chemical stress test appointment on 3/25/24 at 0915. Instructions for test,and medication hold information left on voice mail. Asked patient to call with any questions or if unable to keep appointment.

## 2024-03-25 ENCOUNTER — HOSPITAL ENCOUNTER (OUTPATIENT)
Dept: CARDIOLOGY | Age: 79
Discharge: HOME | End: 2024-03-27
Attending: INTERNAL MEDICINE
Payer: MEDICARE

## 2024-03-25 VITALS
BODY MASS INDEX: 31.42 KG/M2 | SYSTOLIC BLOOD PRESSURE: 136 MMHG | RESPIRATION RATE: 18 BRPM | HEIGHT: 72 IN | WEIGHT: 232 LBS | HEART RATE: 80 BPM | DIASTOLIC BLOOD PRESSURE: 88 MMHG

## 2024-03-25 DIAGNOSIS — I35.0 NONRHEUMATIC AORTIC VALVE STENOSIS: ICD-10-CM

## 2024-03-25 DIAGNOSIS — R06.02 SHORTNESS OF BREATH: ICD-10-CM

## 2024-03-25 DIAGNOSIS — I10 ESSENTIAL HYPERTENSION: ICD-10-CM

## 2024-03-25 DIAGNOSIS — I48.0 PAF (PAROXYSMAL ATRIAL FIBRILLATION) (HCC): ICD-10-CM

## 2024-03-25 LAB
ECHO BSA: 2.31 M2
NUC STRESS EJECTION FRACTION: 53 %
STRESS BASELINE DIAS BP: 88 MMHG
STRESS BASELINE HR: 90 BPM
STRESS BASELINE SYS BP: 136 MMHG
STRESS ESTIMATED WORKLOAD: 1 METS
STRESS O2 SAT REST: 98 %
STRESS PEAK DIAS BP: 64 MMHG
STRESS PEAK SYS BP: 140 MMHG
STRESS PERCENT HR ACHIEVED: 73 %
STRESS POST PEAK HR: 102 BPM
STRESS RATE PRESSURE PRODUCT: NORMAL BPM*MMHG
STRESS TARGET HR: 140 BPM

## 2024-03-25 PROCEDURE — 78452 HT MUSCLE IMAGE SPECT MULT: CPT | Performed by: INTERNAL MEDICINE

## 2024-03-25 PROCEDURE — 93017 CV STRESS TEST TRACING ONLY: CPT

## 2024-03-25 PROCEDURE — 2580000003 HC RX 258: Performed by: INTERNAL MEDICINE

## 2024-03-25 PROCEDURE — 6360000002 HC RX W HCPCS: Performed by: INTERNAL MEDICINE

## 2024-03-25 PROCEDURE — 93016 CV STRESS TEST SUPVJ ONLY: CPT | Performed by: INTERNAL MEDICINE

## 2024-03-25 PROCEDURE — A9502 TC99M TETROFOSMIN: HCPCS | Performed by: INTERNAL MEDICINE

## 2024-03-25 PROCEDURE — 93018 CV STRESS TEST I&R ONLY: CPT | Performed by: INTERNAL MEDICINE

## 2024-03-25 PROCEDURE — 3430000000 HC RX DIAGNOSTIC RADIOPHARMACEUTICAL: Performed by: INTERNAL MEDICINE

## 2024-03-25 RX ORDER — COLCHICINE 0.6 MG/1
0.3 TABLET ORAL EVERY OTHER DAY
COMMUNITY

## 2024-03-25 RX ORDER — SODIUM CHLORIDE 0.9 % (FLUSH) 0.9 %
10 SYRINGE (ML) INJECTION PRN
Status: DISCONTINUED | OUTPATIENT
Start: 2024-03-25 | End: 2024-03-28 | Stop reason: HOSPADM

## 2024-03-25 RX ORDER — REGADENOSON 0.08 MG/ML
0.4 INJECTION, SOLUTION INTRAVENOUS
Status: COMPLETED | OUTPATIENT
Start: 2024-03-25 | End: 2024-03-25

## 2024-03-25 RX ADMIN — REGADENOSON 0.4 MG: 0.08 INJECTION, SOLUTION INTRAVENOUS at 10:31

## 2024-03-25 RX ADMIN — SODIUM CHLORIDE, PRESERVATIVE FREE 10 ML: 5 INJECTION INTRAVENOUS at 09:23

## 2024-03-25 RX ADMIN — TETROFOSMIN 26.1 MILLICURIE: 0.23 INJECTION, POWDER, LYOPHILIZED, FOR SOLUTION INTRAVENOUS at 10:31

## 2024-03-25 RX ADMIN — TETROFOSMIN 8.6 MILLICURIE: 0.23 INJECTION, POWDER, LYOPHILIZED, FOR SOLUTION INTRAVENOUS at 09:23

## 2024-03-25 RX ADMIN — SODIUM CHLORIDE, PRESERVATIVE FREE 10 ML: 5 INJECTION INTRAVENOUS at 10:31

## 2024-03-26 ENCOUNTER — TELEPHONE (OUTPATIENT)
Dept: CARDIOLOGY CLINIC | Age: 79
End: 2024-03-26

## 2024-03-26 NOTE — TELEPHONE ENCOUNTER
Ana Urbina MD McGee, Shelia, MA  Tell hi that his stress test Ok  -low risk  Call for CP on exertion    OV 6 months   Called patient for results

## 2024-06-09 PROCEDURE — 93294 REM INTERROG EVL PM/LDLS PM: CPT | Performed by: STUDENT IN AN ORGANIZED HEALTH CARE EDUCATION/TRAINING PROGRAM

## 2024-06-09 PROCEDURE — 93296 REM INTERROG EVL PM/IDS: CPT | Performed by: STUDENT IN AN ORGANIZED HEALTH CARE EDUCATION/TRAINING PROGRAM

## 2024-06-20 ENCOUNTER — OFFICE VISIT (OUTPATIENT)
Dept: NON INVASIVE DIAGNOSTICS | Age: 79
End: 2024-06-20
Payer: MEDICARE

## 2024-06-20 VITALS
DIASTOLIC BLOOD PRESSURE: 82 MMHG | BODY MASS INDEX: 32.48 KG/M2 | HEART RATE: 77 BPM | WEIGHT: 232 LBS | RESPIRATION RATE: 18 BRPM | HEIGHT: 71 IN | SYSTOLIC BLOOD PRESSURE: 126 MMHG

## 2024-06-20 DIAGNOSIS — Z95.0 PACEMAKER: ICD-10-CM

## 2024-06-20 DIAGNOSIS — Z98.890 S/P ABLATION OF ATRIAL FIBRILLATION: ICD-10-CM

## 2024-06-20 DIAGNOSIS — I48.0 PAF (PAROXYSMAL ATRIAL FIBRILLATION) (HCC): Primary | ICD-10-CM

## 2024-06-20 DIAGNOSIS — Z86.79 S/P ABLATION OF ATRIAL FIBRILLATION: ICD-10-CM

## 2024-06-20 DIAGNOSIS — I44.1 AV BLOCK, MOBITZ 1: ICD-10-CM

## 2024-06-20 PROCEDURE — 1036F TOBACCO NON-USER: CPT | Performed by: NURSE PRACTITIONER

## 2024-06-20 PROCEDURE — G8427 DOCREV CUR MEDS BY ELIG CLIN: HCPCS | Performed by: NURSE PRACTITIONER

## 2024-06-20 PROCEDURE — 93000 ELECTROCARDIOGRAM COMPLETE: CPT | Performed by: NURSE PRACTITIONER

## 2024-06-20 PROCEDURE — G8417 CALC BMI ABV UP PARAM F/U: HCPCS | Performed by: NURSE PRACTITIONER

## 2024-06-20 PROCEDURE — 3074F SYST BP LT 130 MM HG: CPT | Performed by: NURSE PRACTITIONER

## 2024-06-20 PROCEDURE — 3079F DIAST BP 80-89 MM HG: CPT | Performed by: NURSE PRACTITIONER

## 2024-06-20 PROCEDURE — 1123F ACP DISCUSS/DSCN MKR DOCD: CPT | Performed by: NURSE PRACTITIONER

## 2024-06-20 PROCEDURE — 99214 OFFICE O/P EST MOD 30 MIN: CPT | Performed by: NURSE PRACTITIONER

## 2024-06-20 RX ORDER — GLIMEPIRIDE 4 MG/1
1 TABLET ORAL DAILY
COMMUNITY
Start: 2024-04-22

## 2024-06-20 NOTE — PROGRESS NOTES
[facility] Electrophysiology  Outpatient Progress Note  Manny Ceballos  1945  Date of Service: 6/20/2024  PCP: Rustam Pastrana DO  Cardiologist: Dr Urbina   Electrophysiologist: Dr. Quigley         Subjective: Manny Ceballos is seen for follow-up and management of: Afib    Last seen in ablation with Dr. 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  In 2018, patient was diagnosed with nonvalvular paroxysmal AF, which was treated with OAC.  Around this time, he was noted to have first-degree AV block (300 ms).  In 2020, patient was noted to have episodes of 2* AV block.  In 2021, [place] interval had progressed to > 300 ms.  In 4/2023, he was referred to  Dr Quigley office. He complained of dyspnea on exertion as well as fatigue. He underwent BSCI dc PPM for 2* AV block and pseudo-pacemaker syndrome in 5/2023. In 6/2023, he had AF recurrence, which was treated with DCCV in 8/2023. In late 9/2023, he had recurrence of AF and started on amiodarone then referred for DCCV.  In 11/2023, DCCV restored sinus. He had tremors with higher doses of amiodarone. In 1/2024, he had RFA PVI. At the time of ablation, he had multiple epicardial connections in the left PV amber, which were all ablated. Additionally, patient noted to have very large eustachian ridge on ICE and extensive scar/dense fibrosis throughout the LA chamber on 3D electro-anatomic map. In April 2024, he stopped amiodarone. He has done well since that time.  Today in transition, patient has had less of AF burden of 1% and no episodes recently.  He is overall doing good with no complaints from a cardiac standpoint.  He mostly complains of foot pain and leg pain which have been relieved

## 2024-07-19 ENCOUNTER — TELEPHONE (OUTPATIENT)
Dept: ENT CLINIC | Age: 79
End: 2024-07-19

## 2024-07-19 ENCOUNTER — OFFICE VISIT (OUTPATIENT)
Dept: ENT CLINIC | Age: 79
End: 2024-07-19

## 2024-07-19 VITALS
DIASTOLIC BLOOD PRESSURE: 87 MMHG | HEART RATE: 78 BPM | BODY MASS INDEX: 32.65 KG/M2 | WEIGHT: 233.2 LBS | SYSTOLIC BLOOD PRESSURE: 152 MMHG | HEIGHT: 71 IN | TEMPERATURE: 98.1 F

## 2024-07-19 DIAGNOSIS — H61.23 BILATERAL IMPACTED CERUMEN: Primary | ICD-10-CM

## 2024-07-19 DIAGNOSIS — J34.3 NASAL TURBINATE HYPERTROPHY: ICD-10-CM

## 2024-07-19 DIAGNOSIS — J34.89 NASAL OBSTRUCTION: ICD-10-CM

## 2024-07-19 ASSESSMENT — ENCOUNTER SYMPTOMS
VOMITING: 0
COLOR CHANGE: 0
SINUS PAIN: 1
ABDOMINAL PAIN: 0
DIARRHEA: 0
APNEA: 0
SHORTNESS OF BREATH: 0
EYE DISCHARGE: 0
EYES NEGATIVE: 1
CHEST TIGHTNESS: 0
FACIAL SWELLING: 1
SINUS PRESSURE: 1
GASTROINTESTINAL NEGATIVE: 1
EYE PAIN: 0
RESPIRATORY NEGATIVE: 1

## 2024-07-19 NOTE — PROGRESS NOTES
Genitourinary: Negative.  Negative for dysuria, flank pain and hematuria.   Musculoskeletal: Negative.  Negative for arthralgias, gait problem and neck pain.   Skin: Negative.  Negative for color change, pallor and rash.   Allergic/Immunologic: Negative for environmental allergies, food allergies and immunocompromised state.   Neurological: Negative.  Negative for dizziness, numbness and headaches.   Hematological:  Negative for adenopathy.   Psychiatric/Behavioral: Negative.  Negative for behavioral problems and hallucinations.    All other systems reviewed and are negative.              Objective:     Vitals:    07/19/24 0856   BP: (!) 155/81   Pulse: 78   Temp: 98.1 °F (36.7 °C)     Physical Exam  Vitals reviewed.   Constitutional:       General: He is not in acute distress.     Appearance: Normal appearance.   HENT:      Head: Normocephalic and atraumatic.      Right Ear: Tympanic membrane and ear canal normal. There is impacted cerumen.      Left Ear: Tympanic membrane and ear canal normal. There is impacted cerumen.      Nose: Mucosal edema and congestion present.      Right Nostril: No septal hematoma.      Left Nostril: No septal hematoma.      Right Turbinates: Enlarged and swollen.      Left Turbinates: Enlarged and swollen.      Comments: Septum midline and intact. Bilateral nasal congestion with some mucous. B/l mucosal edema over the footplates      Mouth/Throat:      Mouth: Mucous membranes are dry.      Pharynx: No posterior oropharyngeal erythema.   Eyes:      Extraocular Movements: Extraocular movements intact.      Pupils: Pupils are equal, round, and reactive to light.   Cardiovascular:      Rate and Rhythm: Normal rate.   Pulmonary:      Effort: Pulmonary effort is normal.   Musculoskeletal:         General: Normal range of motion.      Cervical back: Normal range of motion and neck supple.   Lymphadenopathy:      Cervical: No cervical adenopathy.   Skin:     General: Skin is warm and dry.

## 2024-07-19 NOTE — TELEPHONE ENCOUNTER
[facility] to authorize order with patient insurance. Patient is scheduled for CT Sinus with radiology on 8/15/24 @ 10:00am. Patient has been notified of date and time and that they need to arrive at 9:30am. Patient was informed she has no prep prior to procedure. Patient instructed to park in [facility] parking lot and report to registration. Detail voicemail left for patient, including radiology scheduling's phone number.    Electronically signed by Amalia Monahan MA on 7/19/24 at 12:18 PM EDT

## 2024-08-15 ENCOUNTER — HOSPITAL ENCOUNTER (OUTPATIENT)
Dept: CT IMAGING | Age: 79
Discharge: HOME | End: 2024-08-17
Attending: OTOLARYNGOLOGY
Payer: MEDICARE

## 2024-08-15 DIAGNOSIS — J34.89 NASAL OBSTRUCTION: ICD-10-CM

## 2024-08-15 DIAGNOSIS — J34.3 NASAL TURBINATE HYPERTROPHY: ICD-10-CM

## 2024-08-15 PROCEDURE — 70486 CT MAXILLOFACIAL W/O DYE: CPT

## 2024-09-08 PROCEDURE — 93296 REM INTERROG EVL PM/IDS: CPT | Performed by: STUDENT IN AN ORGANIZED HEALTH CARE EDUCATION/TRAINING PROGRAM

## 2024-09-08 PROCEDURE — 93294 REM INTERROG EVL PM/LDLS PM: CPT | Performed by: STUDENT IN AN ORGANIZED HEALTH CARE EDUCATION/TRAINING PROGRAM

## 2024-09-13 ENCOUNTER — OFFICE VISIT (OUTPATIENT)
Dept: ENT CLINIC | Age: 79
End: 2024-09-13
Payer: MEDICARE

## 2024-09-13 VITALS
OXYGEN SATURATION: 98 % | SYSTOLIC BLOOD PRESSURE: 139 MMHG | HEART RATE: 81 BPM | HEIGHT: 71 IN | BODY MASS INDEX: 32.2 KG/M2 | WEIGHT: 230 LBS | DIASTOLIC BLOOD PRESSURE: 86 MMHG

## 2024-09-13 DIAGNOSIS — J34.89 NASAL OBSTRUCTION: ICD-10-CM

## 2024-09-13 DIAGNOSIS — H61.23 BILATERAL IMPACTED CERUMEN: Primary | ICD-10-CM

## 2024-09-13 DIAGNOSIS — R09.81 NASAL CONGESTION: ICD-10-CM

## 2024-09-13 DIAGNOSIS — J30.0 VASOMOTOR RHINITIS: ICD-10-CM

## 2024-09-13 DIAGNOSIS — J34.3 NASAL TURBINATE HYPERTROPHY: ICD-10-CM

## 2024-09-13 PROCEDURE — 1123F ACP DISCUSS/DSCN MKR DOCD: CPT | Performed by: OTOLARYNGOLOGY

## 2024-09-13 PROCEDURE — 1036F TOBACCO NON-USER: CPT | Performed by: OTOLARYNGOLOGY

## 2024-09-13 PROCEDURE — G8417 CALC BMI ABV UP PARAM F/U: HCPCS | Performed by: OTOLARYNGOLOGY

## 2024-09-13 PROCEDURE — G8427 DOCREV CUR MEDS BY ELIG CLIN: HCPCS | Performed by: OTOLARYNGOLOGY

## 2024-09-13 PROCEDURE — 3075F SYST BP GE 130 - 139MM HG: CPT | Performed by: OTOLARYNGOLOGY

## 2024-09-13 PROCEDURE — 3079F DIAST BP 80-89 MM HG: CPT | Performed by: OTOLARYNGOLOGY

## 2024-09-13 PROCEDURE — 99213 OFFICE O/P EST LOW 20 MIN: CPT | Performed by: OTOLARYNGOLOGY

## 2024-09-13 RX ORDER — FLUTICASONE PROPIONATE 50 MCG
2 SPRAY, SUSPENSION (ML) NASAL DAILY
Qty: 16 G | Refills: 5 | Status: SHIPPED | OUTPATIENT
Start: 2024-09-13

## 2024-09-16 ENCOUNTER — OFFICE VISIT (OUTPATIENT)
Dept: CARDIOLOGY CLINIC | Age: 79
End: 2024-09-16
Payer: MEDICARE

## 2024-09-16 VITALS
SYSTOLIC BLOOD PRESSURE: 130 MMHG | DIASTOLIC BLOOD PRESSURE: 82 MMHG | WEIGHT: 240.8 LBS | HEART RATE: 83 BPM | HEIGHT: 70 IN | BODY MASS INDEX: 34.47 KG/M2 | RESPIRATION RATE: 18 BRPM

## 2024-09-16 DIAGNOSIS — I10 ESSENTIAL HYPERTENSION: ICD-10-CM

## 2024-09-16 DIAGNOSIS — Z95.0 STATUS POST PLACEMENT OF CARDIAC PACEMAKER: ICD-10-CM

## 2024-09-16 DIAGNOSIS — I48.0 PAF (PAROXYSMAL ATRIAL FIBRILLATION) (HCC): Primary | ICD-10-CM

## 2024-09-16 DIAGNOSIS — I44.1 AV BLOCK, 2ND DEGREE: ICD-10-CM

## 2024-09-16 DIAGNOSIS — Z86.79 S/P ABLATION OF ATRIAL FIBRILLATION: ICD-10-CM

## 2024-09-16 DIAGNOSIS — Z98.890 S/P ABLATION OF ATRIAL FIBRILLATION: ICD-10-CM

## 2024-09-16 PROCEDURE — 3079F DIAST BP 80-89 MM HG: CPT | Performed by: INTERNAL MEDICINE

## 2024-09-16 PROCEDURE — 3075F SYST BP GE 130 - 139MM HG: CPT | Performed by: INTERNAL MEDICINE

## 2024-09-16 PROCEDURE — 99213 OFFICE O/P EST LOW 20 MIN: CPT | Performed by: INTERNAL MEDICINE

## 2024-09-16 PROCEDURE — G8417 CALC BMI ABV UP PARAM F/U: HCPCS | Performed by: INTERNAL MEDICINE

## 2024-09-16 PROCEDURE — G8427 DOCREV CUR MEDS BY ELIG CLIN: HCPCS | Performed by: INTERNAL MEDICINE

## 2024-09-16 PROCEDURE — 1123F ACP DISCUSS/DSCN MKR DOCD: CPT | Performed by: INTERNAL MEDICINE

## 2024-09-16 PROCEDURE — 93000 ELECTROCARDIOGRAM COMPLETE: CPT | Performed by: INTERNAL MEDICINE

## 2024-09-16 PROCEDURE — 1036F TOBACCO NON-USER: CPT | Performed by: INTERNAL MEDICINE

## 2024-09-25 LAB
ALBUMIN: NORMAL
ALP BLD-CCNC: NORMAL U/L
ALT SERPL-CCNC: NORMAL U/L
ANION GAP SERPL CALCULATED.3IONS-SCNC: NORMAL MMOL/L
AST SERPL-CCNC: NORMAL U/L
BILIRUB SERPL-MCNC: NORMAL MG/DL
BUN BLDV-MCNC: NORMAL MG/DL
CALCIUM SERPL-MCNC: NORMAL MG/DL
CHLORIDE BLD-SCNC: NORMAL MMOL/L
CHOLESTEROL, TOTAL: NORMAL
CHOLESTEROL/HDL RATIO: NORMAL
CO2: NORMAL
CREAT SERPL-MCNC: NORMAL MG/DL
CREATININE, URINE, EXTERNAL: 114
ESTIMATED AVERAGE GLUCOSE: NORMAL
GFR, ESTIMATED: NORMAL
GLUCOSE BLD-MCNC: NORMAL MG/DL
HBA1C MFR BLD: 7.2 %
HDLC SERPL-MCNC: NORMAL MG/DL
LDL CHOLESTEROL: NORMAL
MICROALBUMIN URINE, EXTERNAL: 4
MICROALBUMIN/CREAT UR: 35 MG/G{CREAT}
NONHDLC SERPL-MCNC: NORMAL MG/DL
POTASSIUM SERPL-SCNC: NORMAL MMOL/L
SODIUM BLD-SCNC: NORMAL MMOL/L
TOTAL PROTEIN: NORMAL
TRIGL SERPL-MCNC: NORMAL MG/DL
VLDLC SERPL CALC-MCNC: NORMAL MG/DL

## 2024-10-01 ENCOUNTER — OFFICE VISIT (OUTPATIENT)
Age: 79
End: 2024-10-01
Payer: MEDICARE

## 2024-10-01 VITALS
WEIGHT: 236 LBS | OXYGEN SATURATION: 97 % | BODY MASS INDEX: 33.86 KG/M2 | SYSTOLIC BLOOD PRESSURE: 132 MMHG | HEART RATE: 87 BPM | DIASTOLIC BLOOD PRESSURE: 78 MMHG

## 2024-10-01 DIAGNOSIS — I10 ESSENTIAL HYPERTENSION: ICD-10-CM

## 2024-10-01 DIAGNOSIS — R39.14 BENIGN PROSTATIC HYPERPLASIA WITH INCOMPLETE BLADDER EMPTYING: ICD-10-CM

## 2024-10-01 DIAGNOSIS — I48.0 PAROXYSMAL ATRIAL FIBRILLATION (HCC): ICD-10-CM

## 2024-10-01 DIAGNOSIS — E11.3293 MILD NONPROLIFERATIVE DIABETIC RETINOPATHY OF BOTH EYES WITHOUT MACULAR EDEMA ASSOCIATED WITH TYPE 2 DIABETES MELLITUS (HCC): ICD-10-CM

## 2024-10-01 DIAGNOSIS — Z23 NEED FOR INFLUENZA VACCINATION: ICD-10-CM

## 2024-10-01 DIAGNOSIS — N40.1 BENIGN PROSTATIC HYPERPLASIA WITH INCOMPLETE BLADDER EMPTYING: ICD-10-CM

## 2024-10-01 DIAGNOSIS — Z79.01 CURRENT USE OF LONG TERM ANTICOAGULATION: ICD-10-CM

## 2024-10-01 DIAGNOSIS — E11.9 TYPE II DIABETES MELLITUS WITH HBA1C GOAL TO BE DETERMINED (HCC): Primary | ICD-10-CM

## 2024-10-01 DIAGNOSIS — Z95.0 PACEMAKER: ICD-10-CM

## 2024-10-01 PROCEDURE — 3078F DIAST BP <80 MM HG: CPT | Performed by: FAMILY MEDICINE

## 2024-10-01 PROCEDURE — 99214 OFFICE O/P EST MOD 30 MIN: CPT | Performed by: FAMILY MEDICINE

## 2024-10-01 PROCEDURE — 3075F SYST BP GE 130 - 139MM HG: CPT | Performed by: FAMILY MEDICINE

## 2024-10-01 PROCEDURE — 3051F HG A1C>EQUAL 7.0%<8.0%: CPT | Performed by: FAMILY MEDICINE

## 2024-10-01 PROCEDURE — G0008 ADMIN INFLUENZA VIRUS VAC: HCPCS | Performed by: FAMILY MEDICINE

## 2024-10-01 PROCEDURE — 1123F ACP DISCUSS/DSCN MKR DOCD: CPT | Performed by: FAMILY MEDICINE

## 2024-10-01 PROCEDURE — 90653 IIV ADJUVANT VACCINE IM: CPT | Performed by: FAMILY MEDICINE

## 2024-10-01 RX ORDER — LANCETS
EACH MISCELLANEOUS
COMMUNITY
Start: 2019-09-26

## 2024-10-01 RX ORDER — BLOOD SUGAR DIAGNOSTIC
1 STRIP MISCELLANEOUS DAILY
COMMUNITY
Start: 2020-04-27

## 2024-10-01 SDOH — ECONOMIC STABILITY: FOOD INSECURITY: WITHIN THE PAST 12 MONTHS, THE FOOD YOU BOUGHT JUST DIDN'T LAST AND YOU DIDN'T HAVE MONEY TO GET MORE.: NEVER TRUE

## 2024-10-01 SDOH — ECONOMIC STABILITY: INCOME INSECURITY: HOW HARD IS IT FOR YOU TO PAY FOR THE VERY BASICS LIKE FOOD, HOUSING, MEDICAL CARE, AND HEATING?: NOT HARD AT ALL

## 2024-10-01 SDOH — ECONOMIC STABILITY: FOOD INSECURITY: WITHIN THE PAST 12 MONTHS, YOU WORRIED THAT YOUR FOOD WOULD RUN OUT BEFORE YOU GOT MONEY TO BUY MORE.: NEVER TRUE

## 2024-10-01 ASSESSMENT — PATIENT HEALTH QUESTIONNAIRE - PHQ9
2. FEELING DOWN, DEPRESSED OR HOPELESS: NOT AT ALL
SUM OF ALL RESPONSES TO PHQ QUESTIONS 1-9: 0
SUM OF ALL RESPONSES TO PHQ QUESTIONS 1-9: 0
SUM OF ALL RESPONSES TO PHQ9 QUESTIONS 1 & 2: 0
SUM OF ALL RESPONSES TO PHQ QUESTIONS 1-9: 0
1. LITTLE INTEREST OR PLEASURE IN DOING THINGS: NOT AT ALL
SUM OF ALL RESPONSES TO PHQ QUESTIONS 1-9: 0

## 2024-10-01 ASSESSMENT — ENCOUNTER SYMPTOMS
RESPIRATORY NEGATIVE: 1
RECTAL PAIN: 0
GASTROINTESTINAL NEGATIVE: 1
FACIAL SWELLING: 0
ALLERGIC/IMMUNOLOGIC NEGATIVE: 1
SINUS PAIN: 0
EYES NEGATIVE: 1

## 2024-10-01 NOTE — PROGRESS NOTES
Manny Ceballos (:  1945) is a 79 y.o. male,Established patient, here for evaluation of the following chief complaint(s):  Pain (Pt states pain in both knees.  Thinks it might be from a change in meds. ), Hypertension (Pt had recent labs at [email or web address] .  Copy given to pt.  Pt requesting flu shot/L arm), Diabetes, Cholesterol Problem, Congestion, Atrial Fibrillation, and Benign Prostatic Hypertrophy      Assessment & Plan   1. Type II diabetes mellitus with HbA1C goal to be determined (HCC)  Post visit please follow up with routine eye exams, routine podiatry exams, immunization schedules, monitor daily sugars with home glucometers. Continue to follow routine diet and exercises with diabetic diet and exercise 30-40 minutes 4 out of 7 days/week aerobically.  His sugars are much better controlled A1c from 8.5 in February to 7.2  -     Lipid, Fasting; Future  -     Comprehensive Metabolic Panel, Fasting; Future  -     Microalbumin, Ur; Future  -     Hemoglobin A1C; Future  2. Need for influenza vaccination  -     Influenza, FLUAD Trivalent, (age 65 y+), IM, Preservative Free, 0.5mL  3. Paroxysmal atrial fibrillation (HCC) this is a stable condition I reviewed his cardiology notes.  4. Essential hypertension  Follow 2 g sodium restricted diet-DASH for improvement lifestyle and hypertensive control, stable condition  5. Pacemaker I reviewed his pacemaker evaluation recently this spring at his state  6. Benign prostatic hyperplasia with incomplete bladder emptying  Urology sees him on a regular basis.  He will see him once a year for his urology issue  7. Current use of long term anticoagulation.  Continue anticoagulant  8. Mild nonproliferative diabetic retinopathy of both eyes without macular edema associated with type 2 diabetes mellitus (HCC) eye exam will be updated next year.      No follow-ups on file.       Subjective   HPI:  Patient comes in today for 6-month checkup.  The patient has diabetes

## 2024-10-14 DIAGNOSIS — I10 ESSENTIAL HYPERTENSION: ICD-10-CM

## 2024-10-14 RX ORDER — AMLODIPINE BESYLATE 5 MG/1
TABLET ORAL
Qty: 90 TABLET | Refills: 3 | Status: SHIPPED | OUTPATIENT
Start: 2024-10-14

## 2024-10-14 NOTE — TELEPHONE ENCOUNTER
Last seen 10/1/2024  Next appt 4/1/2025    Requested Prescriptions     Pending Prescriptions Disp Refills    amLODIPine (NORVASC) 5 MG tablet [Pharmacy Med Name: AMLODIPINE TAB 5MG] 90 tablet 3     Sig: TAKE 1 TABLET DAILY; NEW   DOSE

## 2024-10-25 ENCOUNTER — TELEPHONE (OUTPATIENT)
Age: 79
End: 2024-10-25

## 2024-10-25 ENCOUNTER — TELEPHONE (OUTPATIENT)
Dept: ENT CLINIC | Age: 79
End: 2024-10-25

## 2024-10-25 ENCOUNTER — OFFICE VISIT (OUTPATIENT)
Dept: ENT CLINIC | Age: 79
End: 2024-10-25
Payer: MEDICARE

## 2024-10-25 ENCOUNTER — PROCEDURE VISIT (OUTPATIENT)
Dept: AUDIOLOGY | Age: 79
End: 2024-10-25

## 2024-10-25 VITALS
OXYGEN SATURATION: 93 % | BODY MASS INDEX: 34.44 KG/M2 | HEART RATE: 82 BPM | DIASTOLIC BLOOD PRESSURE: 92 MMHG | WEIGHT: 240.6 LBS | HEIGHT: 70 IN | SYSTOLIC BLOOD PRESSURE: 145 MMHG

## 2024-10-25 DIAGNOSIS — H61.23 BILATERAL IMPACTED CERUMEN: ICD-10-CM

## 2024-10-25 DIAGNOSIS — H90.3 SENSORINEURAL HEARING LOSS (SNHL) OF BOTH EARS: Primary | ICD-10-CM

## 2024-10-25 DIAGNOSIS — H91.8X3 ASYMMETRICAL HEARING LOSS: ICD-10-CM

## 2024-10-25 DIAGNOSIS — R09.81 NASAL CONGESTION: ICD-10-CM

## 2024-10-25 DIAGNOSIS — J34.3 NASAL TURBINATE HYPERTROPHY: ICD-10-CM

## 2024-10-25 PROCEDURE — 3078F DIAST BP <80 MM HG: CPT | Performed by: OTOLARYNGOLOGY

## 2024-10-25 PROCEDURE — 1123F ACP DISCUSS/DSCN MKR DOCD: CPT | Performed by: OTOLARYNGOLOGY

## 2024-10-25 PROCEDURE — 3077F SYST BP >= 140 MM HG: CPT | Performed by: OTOLARYNGOLOGY

## 2024-10-25 PROCEDURE — 99214 OFFICE O/P EST MOD 30 MIN: CPT | Performed by: OTOLARYNGOLOGY

## 2024-10-25 PROCEDURE — G8417 CALC BMI ABV UP PARAM F/U: HCPCS | Performed by: OTOLARYNGOLOGY

## 2024-10-25 PROCEDURE — G8427 DOCREV CUR MEDS BY ELIG CLIN: HCPCS | Performed by: OTOLARYNGOLOGY

## 2024-10-25 PROCEDURE — 69210 REMOVE IMPACTED EAR WAX UNI: CPT | Performed by: OTOLARYNGOLOGY

## 2024-10-25 PROCEDURE — G8482 FLU IMMUNIZE ORDER/ADMIN: HCPCS | Performed by: OTOLARYNGOLOGY

## 2024-10-25 PROCEDURE — 1036F TOBACCO NON-USER: CPT | Performed by: OTOLARYNGOLOGY

## 2024-10-25 RX ORDER — AZELASTINE 1 MG/ML
1 SPRAY, METERED NASAL 2 TIMES DAILY
Qty: 60 ML | Refills: 1 | Status: SHIPPED
Start: 2024-10-25 | End: 2024-10-28 | Stop reason: SDUPTHER

## 2024-10-25 NOTE — PROGRESS NOTES
Negative.  Negative for appetite change, chills and fever.   HENT:  Positive for congestion, facial swelling, sinus pressure and sinus pain.    Eyes: Negative.  Negative for pain, discharge and visual disturbance.   Respiratory: Negative.  Negative for apnea, chest tightness and shortness of breath.    Cardiovascular: Negative.  Negative for chest pain, palpitations and leg swelling.   Gastrointestinal: Negative.  Negative for abdominal pain, diarrhea and vomiting.   Endocrine: Negative for cold intolerance, heat intolerance and polydipsia.   Genitourinary: Negative.  Negative for dysuria, flank pain and hematuria.   Musculoskeletal: Negative.  Negative for arthralgias, gait problem and neck pain.   Skin: Negative.  Negative for color change, pallor and rash.   Allergic/Immunologic: Negative for environmental allergies, food allergies and immunocompromised state.   Neurological: Negative.  Negative for dizziness, numbness and headaches.   Hematological:  Negative for adenopathy.   Psychiatric/Behavioral: Negative.  Negative for behavioral problems and hallucinations.    All other systems reviewed and are negative.              Objective:     Vitals:    10/25/24 1127   BP: (!) 145/92   Pulse:    SpO2:      Physical Exam  Vitals reviewed.   Constitutional:       General: He is not in acute distress.     Appearance: Normal appearance.   HENT:      Head: Normocephalic and atraumatic.      Right Ear: Tympanic membrane and ear canal normal. There is impacted cerumen.      Left Ear: Tympanic membrane and ear canal normal. There is impacted cerumen.      Nose: Mucosal edema and congestion present.      Right Nostril: No septal hematoma.      Left Nostril: No septal hematoma.      Right Turbinates: Enlarged and swollen.      Left Turbinates: Enlarged and swollen.      Comments: Septum midline and intact. Bilateral nasal congestion with some mucous. B/l mucosal edema over the footplates      Mouth/Throat:      Mouth: Mucous

## 2024-10-25 NOTE — TELEPHONE ENCOUNTER
I spoke with clinical staff to Dr. Pastrana, they will send him a message regarding elevated BP at appt today.     Electronically signed by Nichelle Andres MA on 10/25/24 at 11:40 AM EDT

## 2024-10-25 NOTE — TELEPHONE ENCOUNTER
Nichelle from [facility] called to say they saw pt today and his blood pressure was high so they took it twice. 159/66 and then 145/92. They thought you should know. Pt does not have appt here until April.

## 2024-10-25 NOTE — PROGRESS NOTES
This patient was referred for audiometric/tympanometric testing by Dr. Fajardo due to hearing loss. Patient reported his hearing sensitivity is getting worse and he is not perceiving much benefit [facility] He stated he hears better out of his right ear over his left. He denied any ear pain, fullness, or pressure.       Audiometry using pure tone air and bone conduction revealed a moderate sloping to profound sensorineural hearing loss, in the right ear, and a mild sloping to profound sensorineural hearing loss, in the left ear.  Reliability was good. Speech reception thresholds were in fair agreement with the pure tone averages, in the right ear, and in good agreement with the pure tone averages, in the left ear. Speech discrimination scores were poor, bilaterally. Hearing loss is asymmetric.    Tympanometry revealed normal middle ear peak pressure and compliance, bilaterally.      The results were reviewed with the patient and physician. Briefly discussed expectations of hearing and speech understanding with hearing aid use.     Recommendations for follow up will be made pending ordering provider consult.    GALE Benedict.  Audiology Intern    Stas Avilez/CCC-A  [place] Lic # [id number]

## 2024-10-28 DIAGNOSIS — M10.9 GOUT, UNSPECIFIED CAUSE, UNSPECIFIED CHRONICITY, UNSPECIFIED SITE: ICD-10-CM

## 2024-10-28 DIAGNOSIS — N40.1 BENIGN PROSTATIC HYPERPLASIA WITH INCOMPLETE BLADDER EMPTYING: ICD-10-CM

## 2024-10-28 DIAGNOSIS — R39.14 BENIGN PROSTATIC HYPERPLASIA WITH INCOMPLETE BLADDER EMPTYING: ICD-10-CM

## 2024-10-28 DIAGNOSIS — K21.9 GASTROESOPHAGEAL REFLUX DISEASE WITHOUT ESOPHAGITIS: ICD-10-CM

## 2024-10-28 DIAGNOSIS — I10 ESSENTIAL HYPERTENSION: ICD-10-CM

## 2024-10-28 DIAGNOSIS — J30.9 ALLERGIC RHINITIS, UNSPECIFIED SEASONALITY, UNSPECIFIED TRIGGER: ICD-10-CM

## 2024-10-28 DIAGNOSIS — E11.9 TYPE II DIABETES MELLITUS WITH HBA1C GOAL TO BE DETERMINED (HCC): Primary | ICD-10-CM

## 2024-10-28 RX ORDER — COLCHICINE 0.6 MG/1
0.6 TABLET ORAL EVERY OTHER DAY
Qty: 45 TABLET | Refills: 3 | Status: SHIPPED | OUTPATIENT
Start: 2024-10-28

## 2024-10-28 RX ORDER — FINASTERIDE 5 MG/1
5 TABLET, FILM COATED ORAL DAILY
Qty: 90 TABLET | Refills: 3 | Status: SHIPPED | OUTPATIENT
Start: 2024-10-28

## 2024-10-28 RX ORDER — AMLODIPINE BESYLATE 5 MG/1
5 TABLET ORAL DAILY
Qty: 90 TABLET | Refills: 3 | Status: SHIPPED | OUTPATIENT
Start: 2024-10-28

## 2024-10-28 RX ORDER — ALLOPURINOL 100 MG/1
200 TABLET ORAL DAILY
Qty: 180 TABLET | Refills: 3 | Status: SHIPPED | OUTPATIENT
Start: 2024-10-28

## 2024-10-28 RX ORDER — FLUTICASONE PROPIONATE 50 MCG
2 SPRAY, SUSPENSION (ML) NASAL DAILY
Qty: 3 EACH | Refills: 3 | Status: SHIPPED | OUTPATIENT
Start: 2024-10-28

## 2024-10-28 RX ORDER — GLIMEPIRIDE 4 MG/1
4 TABLET ORAL
Qty: 90 TABLET | Refills: 3 | Status: SHIPPED | OUTPATIENT
Start: 2024-10-28

## 2024-10-28 RX ORDER — AZELASTINE 1 MG/ML
1 SPRAY, METERED NASAL 2 TIMES DAILY
Qty: 3 EACH | Refills: 3 | Status: SHIPPED | OUTPATIENT
Start: 2024-10-28 | End: 2025-01-26

## 2024-10-28 RX ORDER — TAMSULOSIN HYDROCHLORIDE 0.4 MG/1
0.4 CAPSULE ORAL DAILY
Qty: 90 CAPSULE | Refills: 3 | Status: SHIPPED | OUTPATIENT
Start: 2024-10-28

## 2024-10-28 NOTE — TELEPHONE ENCOUNTER
He needs printed [facility] for all of [facility].   Norvasc  Astelin nasal spray  Flonase nasal spray  Flomax  Allopurinol  Colchicine  Finasteride  Glimerpiride  Metformin  Omeprazole   Give to him tomorrow when he comes for his BP check please

## 2024-10-28 NOTE — TELEPHONE ENCOUNTER
Name of Medication(s) Requested:  Requested Prescriptions     Pending Prescriptions Disp Refills    amLODIPine (NORVASC) 5 MG tablet 90 tablet 3     Sig: Take 1 tablet by mouth daily    azelastine (ASTELIN) 0.1 % nasal spray 3 each 3     Si spray by Nasal route 2 times daily Use in each nostril as directed    fluticasone (FLONASE) 50 MCG/ACT nasal spray 3 each 3     Si sprays by Each Nostril route daily    tamsulosin (FLOMAX) 0.4 MG capsule 90 capsule 3     Sig: Take 1 capsule by mouth daily    allopurinol (ZYLOPRIM) 100 MG tablet 180 tablet 3     Sig: Take 2 tablets by mouth daily    colchicine (COLCRYS) 0.6 MG tablet 45 tablet 3     Sig: Take 1 tablet by mouth every other day    finasteride (PROSCAR) 5 MG tablet 90 tablet 3     Sig: Take 1 tablet by mouth daily    glimepiride (AMARYL) 4 MG tablet 90 tablet 3     Sig: Take 1 tablet by mouth every morning (before breakfast)    metFORMIN (GLUCOPHAGE) 1000 MG tablet 180 tablet 3     Sig: Take 1 tablet by mouth 2 times daily (with meals)    omeprazole (PRILOSEC) 20 MG delayed release capsule 90 capsule 3     Sig: Take 1 capsule by mouth daily       Medication is on current medication list Yes    Dosage and directions were verified? Yes    Quantity verified: 90 day supply     Pharmacy Verified?  Yes    Last Appointment:  10/1/2024    Future appts:  Future Appointments   Date Time Provider Department Center   10/29/2024 10:30 AM SCHEDULE, ELADIO MALLORY PINA [facility]   2024  9:15 AM Rusty Quigley DO ELECTRO PHYS [facility]   2024  9:15 AM SCHEDULE, DEVICE CLINIC [address] [place] ELECTRO PHYS [facility]   2025  1:30 PM Rustam Pastrana DO [facility]   2025  9:15 AM Daniel Fajardo, DO [place] [facility]        (If no appt send self scheduling link. .REFILLAPPT)  Scheduling request sent?     [] Yes  [x] No    Does patient need updated?  [] Yes  [x] No

## 2024-10-29 ENCOUNTER — NURSE ONLY (OUTPATIENT)
Age: 79
End: 2024-10-29

## 2024-10-29 ENCOUNTER — TELEPHONE (OUTPATIENT)
Age: 79
End: 2024-10-29

## 2024-10-29 VITALS — HEART RATE: 87 BPM | SYSTOLIC BLOOD PRESSURE: 148 MMHG | DIASTOLIC BLOOD PRESSURE: 86 MMHG

## 2024-10-29 DIAGNOSIS — M25.551 PAIN OF RIGHT HIP: Primary | ICD-10-CM

## 2024-10-29 DIAGNOSIS — I10 ESSENTIAL HYPERTENSION, BENIGN: Primary | ICD-10-CM

## 2024-10-29 NOTE — TELEPHONE ENCOUNTER
He has been having right hip pain and would like to get an x-ray to get the ball rolling on it. He would like to go to a [facility] facility, preferably [facility].

## 2024-10-29 NOTE — PROGRESS NOTES
The patient was here for a  BP check. Reviewed his  current blood pressure medication.     He is on amlodipine 5 mg daily     His BP readings are 140/84 left arm and 160/84 right arm and he brought in his machine and it was 148/86    Pulse 87 and 88      Per Dr. Pastrana we will increase his amlodipine to 10 mg daily and recheck his BP in 2 week. The patient was agreeable to this.

## 2024-10-30 ENCOUNTER — HOSPITAL ENCOUNTER (OUTPATIENT)
Age: 79
Discharge: HOME | End: 2024-11-01
Payer: MEDICARE

## 2024-10-30 ENCOUNTER — TELEPHONE (OUTPATIENT)
Dept: OTOLARYNGOLOGY | Facility: HOSPITAL | Age: 79
End: 2024-10-30
Payer: MEDICARE

## 2024-10-30 ENCOUNTER — HOSPITAL ENCOUNTER (OUTPATIENT)
Dept: GENERAL RADIOLOGY | Age: 79
Discharge: HOME | End: 2024-11-01
Payer: MEDICARE

## 2024-10-30 DIAGNOSIS — M25.551 PAIN OF RIGHT HIP: ICD-10-CM

## 2024-10-30 PROCEDURE — 73502 X-RAY EXAM HIP UNI 2-3 VIEWS: CPT

## 2024-10-31 ENCOUNTER — TELEPHONE (OUTPATIENT)
Age: 79
End: 2024-10-31

## 2024-10-31 NOTE — TELEPHONE ENCOUNTER
Neno would like a referral to Dr Sepulveda for his hip. He saw him for his knee and now would like you to refer him for his hip

## 2024-11-05 DIAGNOSIS — M16.11 PRIMARY OSTEOARTHRITIS OF RIGHT HIP: Primary | ICD-10-CM

## 2024-11-11 ENCOUNTER — TELEPHONE (OUTPATIENT)
Age: 79
End: 2024-11-11

## 2024-11-11 NOTE — TELEPHONE ENCOUNTER
Neno has gout in his left great toe for 3-4 days. It is not swollen , red or hot, It just hurts, more when he puts his shoe on and walking. He feels this is gout and would like medication ordered at [facility].

## 2024-11-12 ENCOUNTER — NURSE ONLY (OUTPATIENT)
Age: 79
End: 2024-11-12

## 2024-11-12 VITALS — HEART RATE: 98 BPM | SYSTOLIC BLOOD PRESSURE: 136 MMHG | DIASTOLIC BLOOD PRESSURE: 70 MMHG | OXYGEN SATURATION: 98 %

## 2024-11-12 DIAGNOSIS — I10 ESSENTIAL HYPERTENSION: ICD-10-CM

## 2024-11-12 NOTE — PROGRESS NOTES
The patient is her for another BP check since increasing his Norvasc to 10 mg daily. He is doing good other than getting himself all worked up about his TV not working this morning.    142/70 and 136/70 pulse 98

## 2024-11-20 ENCOUNTER — HOSPITAL ENCOUNTER (OUTPATIENT)
Dept: GENERAL RADIOLOGY | Age: 79
Discharge: HOME | End: 2024-11-22
Payer: MEDICARE

## 2024-11-20 ENCOUNTER — HOSPITAL ENCOUNTER (OUTPATIENT)
Age: 79
Discharge: HOME | End: 2024-11-22
Payer: MEDICARE

## 2024-11-20 DIAGNOSIS — R52 PAIN: ICD-10-CM

## 2024-11-20 PROCEDURE — 73630 X-RAY EXAM OF FOOT: CPT

## 2024-11-30 DIAGNOSIS — E11.9 TYPE II DIABETES MELLITUS WITH HBA1C GOAL TO BE DETERMINED (HCC): ICD-10-CM

## 2024-12-06 RX ORDER — METOPROLOL SUCCINATE 25 MG/1
25 TABLET, EXTENDED RELEASE ORAL DAILY
Qty: 30 TABLET | Refills: 1 | Status: SHIPPED | OUTPATIENT
Start: 2024-12-06 | End: 2025-03-06

## 2024-12-06 RX ORDER — METOPROLOL SUCCINATE 25 MG/1
25 TABLET, EXTENDED RELEASE ORAL DAILY
COMMUNITY
End: 2024-12-06 | Stop reason: SDUPTHER

## 2024-12-06 NOTE — TELEPHONE ENCOUNTER
Patient's wife called back. I informed her Neno had an episode of a fast heart rates. Joyce would like him to start on a medication called Metoprolol Succ 25 mg daily to help control his heart rates.   He needs to keep his appointment on 12/19/2024. She voiced understanding.    Ginette Amor RN, BSN  [facility] [place]   Device Clinic

## 2024-12-06 NOTE — TELEPHONE ENCOUNTER
----- Message from Joyce CORDOVA sent at 12/5/2024  1:58 PM EST -----  Regarding: RE: nsvt  start 25 toprol please and TB can add or change when he comes in. Not sure why he is not on BB.  ----- Message -----  From: Ginette Amor RN  Sent: 12/5/2024   1:48 PM EST  To: CLOTILDE Kennedy - CNP  Subject: nsvt                                             Reviewed Latitude transmission from 12/4. Patient had a 32 beat episode of NSVT on 11/5/2024 with rate of 167. Had a shorter episode- 10 beat with rate of 190 on 10/5/2024. Patient was on Amio for AF, but drug was discontinued. He had a nuclear stress in March of this year showing moderate severity small perfusion defect in LV. He isn't on any other rate control medications. He is on Eliquis for his PAF. Patient has an appointment on 12/19/24 with TB

## 2024-12-06 NOTE — TELEPHONE ENCOUNTER
I left a message for patient to call the office regarding medication adjustments related to a recent remote pacemaker transmission.    Ginette Amor RN, BSN  [facility] [place]   Device Clinic

## 2024-12-16 DIAGNOSIS — I10 ESSENTIAL HYPERTENSION: ICD-10-CM

## 2024-12-16 RX ORDER — AMLODIPINE BESYLATE 10 MG/1
10 TABLET ORAL DAILY
Qty: 90 TABLET | Refills: 3 | Status: SHIPPED | OUTPATIENT
Start: 2024-12-16

## 2024-12-16 NOTE — TELEPHONE ENCOUNTER
Name of Medication(s) Requested:  Requested Prescriptions     Pending Prescriptions Disp Refills    amLODIPine (NORVASC) 10 MG tablet 90 tablet 3     Sig: Take 1 tablet by mouth daily       Medication is on current medication list Yes    Dosage and directions were verified? Yes    Quantity verified: 90 day supply     Pharmacy Verified?  Yes    Last Appointment:  10/1/2024    Future appts:  Future Appointments   Date Time Provider Department Center   12/19/2024  9:15 AM Rusty Quigley DO ELECTRO PHYS [facility]   12/19/2024  9:15 AM SCHEDULE, DEVICE CLINIC 1 [place] ELECTRO PHYS [facility]   12/20/2024  1:45 PM [facility] MRI RM [facility] MRI [facility] Radiolo   4/1/2025  1:30 PM Rustam Pastrana, DO PINA [facility] DEP   4/25/2025  9:15 AM Daniel Fajardo, DO [place] [facility]        (If no appt send self scheduling link. .REFILLAPPT)  Scheduling request sent?     [] Yes  [x] No    Does patient need updated?  [] Yes  [x] No

## 2024-12-16 NOTE — TELEPHONE ENCOUNTER
Patient's wife requested a refill of his Amlodipine Besylate 5 mg. Pt was originally taking 1 x a day but Dr. Pasrtana increased it to 2 x a day on 11/12/24. They will use [facility] on [facility]

## 2024-12-18 PROCEDURE — 93296 REM INTERROG EVL PM/IDS: CPT | Performed by: STUDENT IN AN ORGANIZED HEALTH CARE EDUCATION/TRAINING PROGRAM

## 2024-12-18 PROCEDURE — 93294 REM INTERROG EVL PM/LDLS PM: CPT | Performed by: STUDENT IN AN ORGANIZED HEALTH CARE EDUCATION/TRAINING PROGRAM

## 2024-12-19 ENCOUNTER — NURSE ONLY (OUTPATIENT)
Dept: NON INVASIVE DIAGNOSTICS | Age: 79
End: 2024-12-19

## 2024-12-19 ENCOUNTER — TELEPHONE (OUTPATIENT)
Dept: CARDIOLOGY | Age: 79
End: 2024-12-19

## 2024-12-19 ENCOUNTER — OFFICE VISIT (OUTPATIENT)
Dept: NON INVASIVE DIAGNOSTICS | Age: 79
End: 2024-12-19

## 2024-12-19 VITALS
HEART RATE: 67 BPM | WEIGHT: 238.8 LBS | TEMPERATURE: 97.3 F | SYSTOLIC BLOOD PRESSURE: 136 MMHG | OXYGEN SATURATION: 95 % | HEIGHT: 71 IN | BODY MASS INDEX: 33.43 KG/M2 | DIASTOLIC BLOOD PRESSURE: 82 MMHG | RESPIRATION RATE: 18 BRPM

## 2024-12-19 DIAGNOSIS — R94.31 ABNORMAL ELECTROCARDIOGRAM (ECG) (EKG): ICD-10-CM

## 2024-12-19 DIAGNOSIS — I42.9 CARDIOMYOPATHY, UNSPECIFIED TYPE (HCC): ICD-10-CM

## 2024-12-19 DIAGNOSIS — Z95.0 PACEMAKER: Primary | ICD-10-CM

## 2024-12-19 DIAGNOSIS — I48.0 PAF (PAROXYSMAL ATRIAL FIBRILLATION) (HCC): Primary | ICD-10-CM

## 2024-12-19 NOTE — PROGRESS NOTES
ARTHROPLASTY  02/15/2010    KNEE SURGERY Left     NOSE SURGERY N/A 2023    INCISION AND DRAINAGE OF NASAL ABSCESS AND NASAL EXPLORATION performed by Daniel Fajardo DO at [facility] OR    PACEMAKER INSERTION  2023    [facility] Dual PPM  Dr. Quigley    POLYPECTOMY  2011    TOENAIL EXCISION Left     UPPER GASTROINTESTINAL ENDOSCOPY  2011      Social History     Tobacco Use    Smoking status: Former     Current packs/day: 0.00     Average packs/day: 3.0 packs/day for 14.0 years (42.0 ttl pk-yrs)     Types: Cigarettes     Start date: 1963     Quit date: 1977     Years since quittin.5    Smokeless tobacco: Former     Types: Snuff     Quit date: 3/1/1977   Vaping Use    Vaping status: Never Used   Substance Use Topics    Alcohol use: Not Currently    Drug use: Never      Family History   Problem Relation Age of Onset    Diabetes Mother     High Blood Pressure Mother     Stroke Mother 65    Diabetes Father     Other Father 72        CHF    Stroke Father     Early Death Sister     Cancer Brother         bladder    No Known Problems Sister       Current Outpatient Medications   Medication Sig Dispense Refill    amLODIPine (NORVASC) 10 MG tablet Take 1 tablet by mouth daily 90 tablet 3    metoprolol succinate (TOPROL XL) 25 MG extended release tablet Take 1 tablet by mouth daily 30 tablet 1    metFORMIN (GLUCOPHAGE) 1000 MG tablet TAKE 1 TABLET TWICE A  tablet 3    fluticasone (FLONASE) 50 MCG/ACT nasal spray 2 sprays by Each Nostril route daily 3 each 3    tamsulosin (FLOMAX) 0.4 MG capsule Take 1 capsule by mouth daily 90 capsule 3    allopurinol (ZYLOPRIM) 100 MG tablet Take 2 tablets by mouth daily 180 tablet 3    colchicine (COLCRYS) 0.6 MG tablet Take 1 tablet by mouth every other day 45 tablet 3    finasteride (PROSCAR) 5 MG tablet Take 1 tablet by mouth daily 90 tablet 3    glimepiride (AMARYL) 4 MG tablet Take 1 tablet by mouth every morning (before breakfast) 90

## 2024-12-19 NOTE — PATIENT INSTRUCTIONS
No medication changes at  this time.  Continue remote monitoring of device every 91 days.  Please call to schedule echocardiogram.  Follow-up with this office in ~

## 2024-12-19 NOTE — TELEPHONE ENCOUNTER
CALLED PATIENT 1X AND LEFT MESSAGE  ON [facility] TO SCHEDULE ECHO     Electronically signed by Ginette Carrillo on 12/19/2024 at 11:30 AM

## 2024-12-20 ENCOUNTER — TELEPHONE (OUTPATIENT)
Dept: CARDIOLOGY | Age: 79
End: 2024-12-20

## 2024-12-20 ENCOUNTER — HOSPITAL ENCOUNTER (OUTPATIENT)
Dept: MRI IMAGING | Age: 79
Discharge: HOME | End: 2024-12-22
Payer: MEDICARE

## 2024-12-20 DIAGNOSIS — M51.362 DEGENERATION OF INTERVERTEBRAL DISC OF LUMBAR REGION WITH DISCOGENIC BACK PAIN AND LOWER EXTREMITY PAIN: ICD-10-CM

## 2024-12-20 PROCEDURE — 72148 MRI LUMBAR SPINE W/O DYE: CPT

## 2024-12-20 NOTE — PROGRESS NOTES
Patient has MRI order today.  Patient has pacemaker and needs to be monitored.  Patient monitored during test, vital signs stable.  Patient tolerated well.

## 2024-12-20 NOTE — TELEPHONE ENCOUNTER
Returned patient's wife call from 12/20/24 to schedule echo. From wife, Patient does not want to wait until next available for echo, patient was offered [place] as well and declined, patient stated they will call the hospital to schedule as they have had them done there in the past. Patient was advised to call us back if they have any further questions.    Electronically signed by Raissa Umaña on 12/20/2024 at 10:26 AM

## 2024-12-23 ENCOUNTER — TELEPHONE (OUTPATIENT)
Dept: NON INVASIVE DIAGNOSTICS | Age: 79
End: 2024-12-23

## 2024-12-23 NOTE — TELEPHONE ENCOUNTER
----- Message from Dr. Rusty Quigley DO sent at 12/19/2024 10:03 AM EST -----  Regarding: [facility]  Please obtain a copy of recent [facility] from [facility] and [facility].    He is on apixaban, so needs annual CBC and CMP.    -Rusty Quigley DO

## 2025-01-06 DIAGNOSIS — J20.8 ACUTE BACTERIAL BRONCHITIS: Primary | ICD-10-CM

## 2025-01-06 DIAGNOSIS — B96.89 ACUTE BACTERIAL BRONCHITIS: Primary | ICD-10-CM

## 2025-01-06 RX ORDER — AZITHROMYCIN 250 MG/1
TABLET, FILM COATED ORAL
Qty: 6 TABLET | Refills: 0 | Status: SHIPPED | OUTPATIENT
Start: 2025-01-06 | End: 2025-01-16

## 2025-01-06 NOTE — TELEPHONE ENCOUNTER
He has been sick all weekend with sinus drasinage and congestion, cough at night hard and his chest hurts, He is concerned with having pneumonia. He has a sore throat. No headache or fever. COVID NEGATIVE. He is taking Nyquil at night and Tylenol during the day. His drainage is clear.

## 2025-01-13 DIAGNOSIS — J01.90 ACUTE BACTERIAL SINUSITIS: Primary | ICD-10-CM

## 2025-01-13 DIAGNOSIS — B96.89 ACUTE BACTERIAL SINUSITIS: Primary | ICD-10-CM

## 2025-01-13 RX ORDER — BENZONATATE 200 MG/1
200 CAPSULE ORAL 3 TIMES DAILY PRN
Qty: 30 CAPSULE | Refills: 0 | Status: SHIPPED | OUTPATIENT
Start: 2025-01-13

## 2025-01-13 RX ORDER — CEFDINIR 300 MG/1
300 CAPSULE ORAL 2 TIMES DAILY
Qty: 20 CAPSULE | Refills: 0 | Status: SHIPPED | OUTPATIENT
Start: 2025-01-13 | End: 2025-01-23

## 2025-01-13 NOTE — TELEPHONE ENCOUNTER
He had gone to [place] walk in on 1/6 and had a chest xray given Zpak. Finished and was feeling better but now again has head congestion and drainage that is bloody mucus. He is coughing a little bit of phlegm. He is not taking sandeep medication at this time besides nyquil at night. This has been a couple days and not getting any better. He would like meds at [facility]

## 2025-01-17 ENCOUNTER — TELEPHONE (OUTPATIENT)
Dept: CARDIOLOGY | Age: 80
End: 2025-01-17

## 2025-01-17 NOTE — TELEPHONE ENCOUNTER
CALLED PATIENT 2X AND LEFT MESSAGE TO SCHEDULE ECHO AT ONE OF OUR 3 LOCATIONS.. ON 12-20-24 PATIENTS WIFE STATED THAT THE PATIENT WILL GET ECHO DONE AT THE HOSPITAL. I WAS JUST FOLLOWING UP ON PATIENT TO SCHEDULE ECHO    Electronically signed by Ginette Carrillo on 1/17/2025 at 9:37 AM

## 2025-01-17 NOTE — TELEPHONE ENCOUNTER
Returned patient's wife call to schedule echo. Patient called 01/17/2025 @ 12:10. L/M to call back    Electronically signed by Raissa Umaña on 1/17/2025 at 1:53 PM

## 2025-02-10 RX ORDER — METOPROLOL SUCCINATE 25 MG/1
25 TABLET, EXTENDED RELEASE ORAL DAILY
Qty: 90 TABLET | Refills: 3 | Status: SHIPPED
Start: 2025-02-10 | End: 2025-02-11 | Stop reason: SDUPTHER

## 2025-02-11 ENCOUNTER — TELEPHONE (OUTPATIENT)
Age: 80
End: 2025-02-11

## 2025-02-11 DIAGNOSIS — M10.9 GOUT, UNSPECIFIED CAUSE, UNSPECIFIED CHRONICITY, UNSPECIFIED SITE: ICD-10-CM

## 2025-02-11 RX ORDER — METOPROLOL SUCCINATE 25 MG/1
25 TABLET, EXTENDED RELEASE ORAL DAILY
Qty: 30 TABLET | Refills: 0 | Status: SHIPPED | OUTPATIENT
Start: 2025-02-11

## 2025-02-11 RX ORDER — PREDNISONE 10 MG/1
10 TABLET ORAL DAILY
Qty: 7 TABLET | Refills: 0 | Status: SHIPPED | OUTPATIENT
Start: 2025-02-11

## 2025-02-11 RX ORDER — COLCHICINE 0.6 MG/1
0.6 TABLET ORAL DAILY
Qty: 45 TABLET | Refills: 3 | Status: SHIPPED | OUTPATIENT
Start: 2025-02-11

## 2025-02-11 RX ORDER — COLCHICINE 0.6 MG/1
0.6 TABLET ORAL DAILY
Qty: 45 TABLET | Refills: 3 | Status: SHIPPED
Start: 2025-02-11 | End: 2025-02-11

## 2025-02-11 RX ORDER — COLCHICINE 0.6 MG/1
0.6 TABLET ORAL EVERY OTHER DAY
Qty: 15 TABLET | Refills: 0 | Status: SHIPPED | OUTPATIENT
Start: 2025-02-11

## 2025-02-11 NOTE — TELEPHONE ENCOUNTER
[facility] called [facility]'s [facility] for colchicine. She was asking for documentation regarding patient's gout. She said they don't usually get prescriptions for patients to take cochicine for an extended period of time. We do not have documentation to give her.    Sandee RN care manager    Fax: [phone]    Phone: [phone] EXT [phone]

## 2025-02-11 NOTE — TELEPHONE ENCOUNTER
Patient's wife called to ask for gout medication for patient. His right big toe is swollen and hurts. He uses [facility] on [address] JA[place]

## 2025-02-11 NOTE — TELEPHONE ENCOUNTER
I faxed Dr. Pastrana [facility] to [facility] at [facility]    2/12/25 Sandee called back because she didn't get a message from me. I explained that I faxed her a note from Dr. Pastrana. She had not received it yet and asked me to refax it. I did.

## 2025-02-11 NOTE — TELEPHONE ENCOUNTER
SIGN ORDER    I spoke to Pratibha and Kota. The [facility] did not give him his Colchine. So he needs 30 day and 90 day rx's.

## 2025-02-17 ENCOUNTER — HOSPITAL ENCOUNTER (OUTPATIENT)
Dept: CARDIOLOGY | Age: 80
Discharge: HOME | End: 2025-02-19
Attending: STUDENT IN AN ORGANIZED HEALTH CARE EDUCATION/TRAINING PROGRAM
Payer: MEDICARE

## 2025-02-17 VITALS
DIASTOLIC BLOOD PRESSURE: 82 MMHG | SYSTOLIC BLOOD PRESSURE: 136 MMHG | BODY MASS INDEX: 33.32 KG/M2 | WEIGHT: 238 LBS | HEIGHT: 71 IN

## 2025-02-17 DIAGNOSIS — I42.9 CARDIOMYOPATHY, UNSPECIFIED TYPE (HCC): ICD-10-CM

## 2025-02-17 DIAGNOSIS — R94.31 ABNORMAL ELECTROCARDIOGRAM (ECG) (EKG): ICD-10-CM

## 2025-02-17 PROCEDURE — 93306 TTE W/DOPPLER COMPLETE: CPT

## 2025-02-18 LAB
ECHO AO ASC DIAM: 3.8 CM
ECHO AO ASCENDING AORTA INDEX: 1.67 CM/M2
ECHO AV AREA PEAK VELOCITY: 0.9 CM2
ECHO AV AREA PLAN/BSA: 0.53 CM2/M2
ECHO AV AREA PLAN: 1.2 CM2
ECHO AV AREA VTI: 0.9 CM2
ECHO AV AREA/BSA PEAK VELOCITY: 0.4 CM2/M2
ECHO AV AREA/BSA VTI: 0.4 CM2/M2
ECHO AV CUSP MM: 0.7 CM
ECHO AV MEAN GRADIENT: 29 MMHG
ECHO AV MEAN VELOCITY: 2.5 M/S
ECHO AV PEAK GRADIENT: 48 MMHG
ECHO AV PEAK VELOCITY: 3.5 M/S
ECHO AV VELOCITY RATIO: 0.29
ECHO AV VTI: 79.2 CM
ECHO BSA: 2.33 M2
ECHO EST RA PRESSURE: 8 MMHG
ECHO IVC INSP: 2.5 CM
ECHO LA DIAMETER INDEX: 2.03 CM/M2
ECHO LA DIAMETER: 4.6 CM
ECHO LA VOL A-L A2C: 132 ML (ref 18–58)
ECHO LA VOL A-L A4C: 105 ML (ref 18–58)
ECHO LA VOL MOD A2C: 121 ML (ref 18–58)
ECHO LA VOL MOD A4C: 100 ML (ref 18–58)
ECHO LA VOLUME AREA LENGTH: 120 ML
ECHO LA VOLUME INDEX A-L A2C: 58 ML/M2 (ref 16–34)
ECHO LA VOLUME INDEX A-L A4C: 46 ML/M2 (ref 16–34)
ECHO LA VOLUME INDEX AREA LENGTH: 53 ML/M2 (ref 16–34)
ECHO LA VOLUME INDEX MOD A2C: 53 ML/M2 (ref 16–34)
ECHO LA VOLUME INDEX MOD A4C: 44 ML/M2 (ref 16–34)
ECHO LV EDV A2C: 108 ML
ECHO LV EDV A4C: 91 ML
ECHO LV EDV BP: 100 ML (ref 67–155)
ECHO LV EDV INDEX A4C: 40 ML/M2
ECHO LV EDV INDEX BP: 44 ML/M2
ECHO LV EDV NDEX A2C: 48 ML/M2
ECHO LV EF PHYSICIAN: 60 %
ECHO LV EJECTION FRACTION A2C: 66 %
ECHO LV EJECTION FRACTION A4C: 58 %
ECHO LV EJECTION FRACTION BIPLANE: 61 % (ref 55–100)
ECHO LV ESV A2C: 37 ML
ECHO LV ESV A4C: 38 ML
ECHO LV ESV BP: 39 ML (ref 22–58)
ECHO LV ESV INDEX A2C: 16 ML/M2
ECHO LV ESV INDEX A4C: 17 ML/M2
ECHO LV ESV INDEX BP: 17 ML/M2
ECHO LV FRACTIONAL SHORTENING: 33 % (ref 28–44)
ECHO LV INTERNAL DIMENSION DIASTOLE INDEX: 1.89 CM/M2
ECHO LV INTERNAL DIMENSION DIASTOLIC: 4.3 CM (ref 4.2–5.9)
ECHO LV INTERNAL DIMENSION SYSTOLIC INDEX: 1.28 CM/M2
ECHO LV INTERNAL DIMENSION SYSTOLIC: 2.9 CM
ECHO LV ISOVOLUMETRIC RELAXATION TIME (IVRT): 64.6 MS
ECHO LV IVSD: 1.6 CM (ref 0.6–1)
ECHO LV IVSS: 1.9 CM
ECHO LV MASS 2D: 245 G (ref 88–224)
ECHO LV MASS INDEX 2D: 107.9 G/M2 (ref 49–115)
ECHO LV POSTERIOR WALL DIASTOLIC: 1.3 CM (ref 0.6–1)
ECHO LV POSTERIOR WALL SYSTOLIC: 1.8 CM
ECHO LV RELATIVE WALL THICKNESS RATIO: 0.6
ECHO LVOT AREA: 3.5 CM2
ECHO LVOT AV VTI INDEX: 0.28
ECHO LVOT DIAM: 2.1 CM
ECHO LVOT MEAN GRADIENT: 2 MMHG
ECHO LVOT PEAK GRADIENT: 4 MMHG
ECHO LVOT PEAK VELOCITY: 1 M/S
ECHO LVOT STROKE VOLUME INDEX: 33.2 ML/M2
ECHO LVOT SV: 75.5 ML
ECHO LVOT VTI: 21.8 CM
ECHO MV A VELOCITY: 0.35 M/S
ECHO MV AREA PHT: 4.2 CM2
ECHO MV AREA VTI: 2.7 CM2
ECHO MV E DECELERATION TIME (DT): 182 MS
ECHO MV E VELOCITY: 1.01 M/S
ECHO MV E/A RATIO: 2.89
ECHO MV LVOT VTI INDEX: 1.28
ECHO MV MAX VELOCITY: 1.1 M/S
ECHO MV MEAN GRADIENT: 1 MMHG
ECHO MV MEAN VELOCITY: 0.5 M/S
ECHO MV PEAK GRADIENT: 5 MMHG
ECHO MV PRESSURE HALF TIME (PHT): 52.1 MS
ECHO MV VTI: 28 CM
ECHO PV MAX VELOCITY: 0.9 M/S
ECHO PV MEAN GRADIENT: 1 MMHG
ECHO PV MEAN VELOCITY: 0.5 M/S
ECHO PV PEAK GRADIENT: 3 MMHG
ECHO PV VTI: 18.9 CM
ECHO PVEIN A DURATION: 115.3 MS
ECHO PVEIN A VELOCITY: 0.2 M/S
ECHO PVEIN PEAK D VELOCITY: 0.6 M/S
ECHO PVEIN PEAK S VELOCITY: 0.3 M/S
ECHO PVEIN S/D RATIO: 0.5
ECHO RIGHT VENTRICULAR SYSTOLIC PRESSURE (RVSP): 36 MMHG
ECHO RV BASAL DIMENSION: 4.1 CM
ECHO RV INTERNAL DIMENSION: 4 CM
ECHO RV LONGITUDINAL DIMENSION: 7.5 CM
ECHO RV MID DIMENSION: 2.8 CM
ECHO RV TAPSE: 2.6 CM (ref 1.7–?)
ECHO TV REGURGITANT MAX VELOCITY: 2.64 M/S
ECHO TV REGURGITANT PEAK GRADIENT: 28 MMHG

## 2025-03-03 ENCOUNTER — TELEPHONE (OUTPATIENT)
Dept: NON INVASIVE DIAGNOSTICS | Age: 80
End: 2025-03-03

## 2025-03-03 NOTE — TELEPHONE ENCOUNTER
Spoke with patient and they verbalized understanding.  I have a message out to Dr Urbina's office to schedule patient.

## 2025-03-03 NOTE — TELEPHONE ENCOUNTER
----- Message from Dr. Rusty Quigley,  sent at 2/19/2025  8:06 AM EST -----  Regarding: TTE  TTE (2/18/2025): LVEF = 60%, moderate concentric LVH, mild LAE, moderate-severe AS (mean gradient 29 mmHg), mild TR.    I believe Dr. Urbina wanted to see him back in March.  I do not see an appointment scheduled.  Could you facilitate?    -Rusty Quigley, DO

## 2025-03-04 ENCOUNTER — TELEPHONE (OUTPATIENT)
Dept: CARDIOLOGY CLINIC | Age: 80
End: 2025-03-04

## 2025-03-04 NOTE — TELEPHONE ENCOUNTER
From: Brooke Molina MA   Sent: 3/3/2025   3:22 PM EST   To: Lauren Moody MA   Subject: appt for mutual patient-I'm not sure who  *     Dr Quigley sent me a message to help patient make a f/u appt for patient with Dr Urbina for 6 months, which would be March 2025.     Called patient

## 2025-03-06 ENCOUNTER — TELEPHONE (OUTPATIENT)
Dept: CARDIOLOGY CLINIC | Age: 80
End: 2025-03-06

## 2025-03-06 NOTE — TELEPHONE ENCOUNTER
----- Message from OCTAVIA TALLEY MA sent at 3/3/2025  3:16 PM EST -----  Regarding: appt for mutual patient-I'm not sure who Dr Urbina's coordinator is, if not you please forward  Dr Quigley sent me a message to help patient make a f/u appt for patient with Dr Urbina for 6 months, which would be March 2025.

## 2025-03-21 ENCOUNTER — APPOINTMENT (OUTPATIENT)
Dept: GENERAL RADIOLOGY | Age: 80
End: 2025-03-21
Payer: OTHER GOVERNMENT

## 2025-03-21 ENCOUNTER — HOSPITAL ENCOUNTER (EMERGENCY)
Age: 80
Discharge: HOME | End: 2025-03-21
Payer: OTHER GOVERNMENT

## 2025-03-21 VITALS
HEART RATE: 78 BPM | RESPIRATION RATE: 20 BRPM | TEMPERATURE: 97.8 F | BODY MASS INDEX: 33.47 KG/M2 | SYSTOLIC BLOOD PRESSURE: 153 MMHG | WEIGHT: 240 LBS | OXYGEN SATURATION: 96 % | DIASTOLIC BLOOD PRESSURE: 81 MMHG

## 2025-03-21 DIAGNOSIS — S80.212A KNEE ABRASION, LEFT, INITIAL ENCOUNTER: ICD-10-CM

## 2025-03-21 DIAGNOSIS — S80.02XA CONTUSION OF LEFT KNEE, INITIAL ENCOUNTER: ICD-10-CM

## 2025-03-21 DIAGNOSIS — S20.212A CONTUSION OF LEFT CHEST WALL, INITIAL ENCOUNTER: Primary | ICD-10-CM

## 2025-03-21 PROCEDURE — 99211 OFF/OP EST MAY X REQ PHY/QHP: CPT

## 2025-03-21 PROCEDURE — 71101 X-RAY EXAM UNILAT RIBS/CHEST: CPT

## 2025-03-21 PROCEDURE — 73560 X-RAY EXAM OF KNEE 1 OR 2: CPT

## 2025-03-21 RX ORDER — PREDNISONE 20 MG/1
20 TABLET ORAL DAILY
Qty: 5 TABLET | Refills: 0 | Status: SHIPPED | OUTPATIENT
Start: 2025-03-21 | End: 2025-03-26

## 2025-03-21 RX ORDER — CEPHALEXIN 500 MG/1
500 CAPSULE ORAL 3 TIMES DAILY
Qty: 21 CAPSULE | Refills: 0 | Status: SHIPPED | OUTPATIENT
Start: 2025-03-21 | End: 2025-03-28

## 2025-03-21 ASSESSMENT — PAIN SCALES - GENERAL: PAINLEVEL_OUTOF10: 6

## 2025-03-21 ASSESSMENT — PAIN - FUNCTIONAL ASSESSMENT: PAIN_FUNCTIONAL_ASSESSMENT: 0-10

## 2025-03-21 NOTE — ED PROVIDER NOTES
[facility] URGENT CARE  EMERGENCY DEPARTMENT ENCOUNTER        NAME: Manny Ceballos  :  1945  MRN:  [id number]  Date of evaluation: 3/21/2025  Provider: Jose Cavazos PA-C  PCP: Rustam Pastrana DO  Note Started : 12:42 PM EDT 3/21/25    Chief Complaint: Fall (Fell on wed at home injuring his left knee and left side of ribs. )      This is an 79-year-old male that presents to urgent care with his wife.  Patient states that 2 days ago he was trying to get out of a chair and he tripped.  He fell onto his left lateral chest area as well as his left knee.  He denies head neck other chest back or other extremity pain.  No numbness or tingling.  No shortness of breath.  On first contact patient he appears to be in no acute distress.        Review of Systems  Pertinent positives and negatives are stated within HPI, all other systems reviewed and are negative.     Allergies: Patient has no known allergies.     --------------------------------------------- PAST HISTORY ---------------------------------------------  Past Medical History:  has a past medical history of Arthritis, Atrial fibrillation (HCC), Alonzo's esophagus, BPH (benign prostatic hypertrophy), Diverticulosis, Gastritis, GERD (gastroesophageal reflux disease), H/O non-insulin dependent diabetes mellitus, Hemorrhoids, internal, Hernia, hiatal, HTN (hypertension), Hyperlipidemia, Hypertension, Mobitz type 1 second degree atrioventricular block, Nonalcoholic fatty liver disease, Obesity, Pneumonia, Polyp of colon, and Type II diabetes mellitus with HbA1C goal to be determined ([facility]).    Past Surgical History:  has a past surgical history that includes knee surgery (Left); Colonoscopy (2014); polypectomy (2011); Upper gastrointestinal endoscopy (2011); hernia repair (2011); Knee Arthroplasty (02/15/2010); Endoscopy, colon, diagnostic; Pacemaker insertion (2023); Cardioversion (2023); Nose surgery (N/A,  knee, initial encounter    3. Knee abrasion, left, initial encounter        Disposition: Discharge to home  Patient condition is good    I am the Primary Clinician of Record.     Jose Cavazos PA-C (electronically signed) on 3/21/2025 at 2:19 PM         Jose Cavazos PA-C  03/21/25 1429

## 2025-03-27 DIAGNOSIS — E11.9 TYPE II DIABETES MELLITUS WITH HBA1C GOAL TO BE DETERMINED (HCC): ICD-10-CM

## 2025-03-27 LAB
ALBUMIN: 4.1 G/DL (ref 3.5–5.2)
ALP BLD-CCNC: 55 U/L (ref 40–129)
ALT SERPL-CCNC: 40 U/L (ref 0–40)
ANION GAP SERPL CALCULATED.3IONS-SCNC: 19 MMOL/L (ref 7–16)
AST SERPL-CCNC: 30 U/L (ref 0–39)
BILIRUB SERPL-MCNC: 0.6 MG/DL (ref 0–1.2)
BUN BLDV-MCNC: 21 MG/DL (ref 6–23)
CALCIUM SERPL-MCNC: 9.7 MG/DL (ref 8.6–10.2)
CHLORIDE BLD-SCNC: 99 MMOL/L (ref 98–107)
CHOLESTEROL, FASTING: 223 MG/DL
CO2: 23 MMOL/L (ref 22–29)
CREAT SERPL-MCNC: 1.2 MG/DL (ref 0.7–1.2)
CREATININE URINE: 86.4 MG/DL (ref 40–278)
GFR, ESTIMATED: 58 ML/MIN/1.73M2
GLUCOSE FASTING: 77 MG/DL (ref 74–99)
HBA1C MFR BLD: 7.2 % (ref 4–5.6)
HDLC SERPL-MCNC: 45 MG/DL
LDL CHOLESTEROL: 157 MG/DL
MICROALBUMIN/CREAT 24H UR: 40 MG/L (ref 0–19)
MICROALBUMIN/CREAT UR-RTO: 47 MCG/MG CREAT (ref 0–30)
POTASSIUM SERPL-SCNC: 3.9 MMOL/L (ref 3.5–5)
SODIUM BLD-SCNC: 141 MMOL/L (ref 132–146)
TOTAL PROTEIN: 7.4 G/DL (ref 6.4–8.3)
TRIGLYCERIDE, FASTING: 106 MG/DL
VLDLC SERPL CALC-MCNC: 21 MG/DL

## 2025-03-28 ENCOUNTER — RESULTS FOLLOW-UP (OUTPATIENT)
Age: 80
End: 2025-03-28

## 2025-04-01 ENCOUNTER — OFFICE VISIT (OUTPATIENT)
Age: 80
End: 2025-04-01

## 2025-04-01 VITALS
HEART RATE: 74 BPM | RESPIRATION RATE: 16 BRPM | WEIGHT: 236 LBS | SYSTOLIC BLOOD PRESSURE: 132 MMHG | DIASTOLIC BLOOD PRESSURE: 83 MMHG | BODY MASS INDEX: 33.04 KG/M2 | OXYGEN SATURATION: 95 % | HEIGHT: 71 IN | TEMPERATURE: 98.4 F

## 2025-04-01 DIAGNOSIS — Z95.0 PACEMAKER: ICD-10-CM

## 2025-04-01 DIAGNOSIS — I44.1 AV BLOCK, 2ND DEGREE: ICD-10-CM

## 2025-04-01 DIAGNOSIS — I10 ESSENTIAL HYPERTENSION: ICD-10-CM

## 2025-04-01 DIAGNOSIS — Z98.890 S/P ABLATION OF ATRIAL FIBRILLATION: ICD-10-CM

## 2025-04-01 DIAGNOSIS — Z91.81 AT MODERATE RISK FOR FALL: ICD-10-CM

## 2025-04-01 DIAGNOSIS — Z00.00 MEDICARE ANNUAL WELLNESS VISIT, SUBSEQUENT: Primary | ICD-10-CM

## 2025-04-01 DIAGNOSIS — I48.0 PAROXYSMAL ATRIAL FIBRILLATION (HCC): ICD-10-CM

## 2025-04-01 DIAGNOSIS — Z79.01 CURRENT USE OF LONG TERM ANTICOAGULATION: ICD-10-CM

## 2025-04-01 DIAGNOSIS — E11.9 TYPE II DIABETES MELLITUS WITH HBA1C GOAL TO BE DETERMINED (HCC): ICD-10-CM

## 2025-04-01 DIAGNOSIS — Z86.79 S/P ABLATION OF ATRIAL FIBRILLATION: ICD-10-CM

## 2025-04-01 PROCEDURE — 3078F DIAST BP <80 MM HG: CPT | Performed by: FAMILY MEDICINE

## 2025-04-01 PROCEDURE — 3051F HG A1C>EQUAL 7.0%<8.0%: CPT | Performed by: FAMILY MEDICINE

## 2025-04-01 PROCEDURE — 1123F ACP DISCUSS/DSCN MKR DOCD: CPT | Performed by: FAMILY MEDICINE

## 2025-04-01 PROCEDURE — G0439 PPPS, SUBSEQ VISIT: HCPCS | Performed by: FAMILY MEDICINE

## 2025-04-01 PROCEDURE — 3075F SYST BP GE 130 - 139MM HG: CPT | Performed by: FAMILY MEDICINE

## 2025-04-01 RX ORDER — IPRATROPIUM BROMIDE 42 UG/1
SPRAY, METERED NASAL
COMMUNITY
Start: 2025-01-03

## 2025-04-01 SDOH — ECONOMIC STABILITY: FOOD INSECURITY: WITHIN THE PAST 12 MONTHS, THE FOOD YOU BOUGHT JUST DIDN'T LAST AND YOU DIDN'T HAVE MONEY TO GET MORE.: NEVER TRUE

## 2025-04-01 SDOH — ECONOMIC STABILITY: FOOD INSECURITY: WITHIN THE PAST 12 MONTHS, YOU WORRIED THAT YOUR FOOD WOULD RUN OUT BEFORE YOU GOT MONEY TO BUY MORE.: NEVER TRUE

## 2025-04-01 ASSESSMENT — PATIENT HEALTH QUESTIONNAIRE - PHQ9
SUM OF ALL RESPONSES TO PHQ QUESTIONS 1-9: 0
2. FEELING DOWN, DEPRESSED OR HOPELESS: NOT AT ALL
SUM OF ALL RESPONSES TO PHQ QUESTIONS 1-9: 0
1. LITTLE INTEREST OR PLEASURE IN DOING THINGS: NOT AT ALL

## 2025-04-01 NOTE — PROGRESS NOTES
Weight: 107 kg (236 lb)    Height: 1.803 m (5' 11\")       Body mass index is 32.92 kg/m².                  No Known Allergies  Prior to Visit Medications    Medication Sig Taking? Authorizing Provider   ipratropium (ATROVENT) 0.06 % nasal spray Ipratropium Bromide 42 mcg (0.06 %) spray,non-aerosol Active NASAL January 3rd, 2025 1:00am Yes Lakhwinder Thompson MD   colchicine (COLCRYS) 0.6 MG tablet Take 1 tablet by mouth daily Yes Rustam Pastrana DO   metoprolol succinate (TOPROL XL) 25 MG extended release tablet Take 1 tablet by mouth daily Yes Joyce Sierra, APRN - CNP   amLODIPine (NORVASC) 10 MG tablet Take 1 tablet by mouth daily  Patient taking differently: Take 0.5 tablets by mouth daily Yes Rustam Pastrana,    metFORMIN (GLUCOPHAGE) 1000 MG tablet TAKE 1 TABLET TWICE A DAY Yes Rustam Pastrana,    fluticasone (FLONASE) 50 MCG/ACT nasal spray 2 sprays by Each Nostril route daily Yes Rustam Pastrana DO   tamsulosin (FLOMAX) 0.4 MG capsule Take 1 capsule by mouth daily Yes Rustam Pastrana DO   allopurinol (ZYLOPRIM) 100 MG tablet Take 2 tablets by mouth daily Yes Rustam Pastrana DO   finasteride (PROSCAR) 5 MG tablet Take 1 tablet by mouth daily Yes Rustam Pastrana DO   glimepiride (AMARYL) 4 MG tablet Take 1 tablet by mouth every morning (before breakfast) Yes Rustam Pastrana DO   omeprazole (PRILOSEC) 20 MG delayed release capsule Take 1 capsule by mouth daily Yes Rustam Pastrana DO   Accu-Chek Softclix Lancets MISC USE  1 x DAILY AS DIRECTEDDX:E11.9 Yes Lakhwinder Thompson MD   blood glucose test strips (EXACTECH TEST) strip 1 each daily Yes Lakhwinder Thompson MD   apixaban (ELIQUIS) 5 MG TABS tablet Take 1 tablet by mouth 2 times daily Yes Nicole Hickman MD   Multiple Vitamins-Minerals (CENTRUM ULTRA MENS) TABS Take 1 tablet by mouth daily  Yes Lakhwinder Thompson MD   colchicine (COLCRYS) 0.6 MG tablet Take 1 tablet by mouth every other day  Patient not taking:

## 2025-04-04 LAB
ALBUMIN: NORMAL
ALP BLD-CCNC: NORMAL U/L
ALT SERPL-CCNC: NORMAL U/L
AST SERPL-CCNC: NORMAL U/L
BASOPHILS ABSOLUTE: NORMAL
BASOPHILS RELATIVE PERCENT: NORMAL
BILIRUB SERPL-MCNC: NORMAL MG/DL
BUN BLDV-MCNC: NORMAL MG/DL
CALCIUM SERPL-MCNC: NORMAL MG/DL
CHLORIDE BLD-SCNC: NORMAL MMOL/L
CHOLESTEROL, FASTING: NORMAL
CO2: NORMAL
CREAT SERPL-MCNC: NORMAL MG/DL
CREATININE URINE: 95 MG/DL
EOSINOPHILS ABSOLUTE: NORMAL
EOSINOPHILS RELATIVE PERCENT: NORMAL
ESTIMATED AVERAGE GLUCOSE: NORMAL
GFR, ESTIMATED: NORMAL
GLUCOSE FASTING: NORMAL
HBA1C MFR BLD: 7.4 %
HCT VFR BLD CALC: NORMAL %
HDLC SERPL-MCNC: NORMAL MG/DL
HEMOGLOBIN: NORMAL
LDL CHOLESTEROL: NORMAL
LYMPHOCYTES ABSOLUTE: NORMAL
LYMPHOCYTES RELATIVE PERCENT: NORMAL
MCH RBC QN AUTO: NORMAL PG
MCHC RBC AUTO-ENTMCNC: NORMAL G/DL
MCV RBC AUTO: NORMAL FL
MICROALBUMIN/CREAT 24H UR: 4.2 MG/DL
MICROALBUMIN/CREAT UR-RTO: 44.2 MG/G
MONOCYTES ABSOLUTE: NORMAL
MONOCYTES RELATIVE PERCENT: NORMAL
NEUTROPHILS ABSOLUTE: NORMAL
NEUTROPHILS RELATIVE PERCENT: NORMAL
PDW BLD-RTO: NORMAL %
PLATELET # BLD: NORMAL 10*3/UL
PMV BLD AUTO: NORMAL FL
POTASSIUM SERPL-SCNC: NORMAL MMOL/L
RBC # BLD: NORMAL 10*6/UL
SODIUM BLD-SCNC: NORMAL MMOL/L
TOTAL PROTEIN: NORMAL
TRIGLYCERIDE, FASTING: NORMAL
WBC # BLD: NORMAL 10*3/UL

## 2025-04-11 ENCOUNTER — OFFICE VISIT (OUTPATIENT)
Dept: CARDIOLOGY CLINIC | Age: 80
End: 2025-04-11

## 2025-04-11 VITALS
DIASTOLIC BLOOD PRESSURE: 66 MMHG | RESPIRATION RATE: 16 BRPM | BODY MASS INDEX: 33.04 KG/M2 | HEART RATE: 76 BPM | WEIGHT: 236 LBS | HEIGHT: 71 IN | SYSTOLIC BLOOD PRESSURE: 128 MMHG

## 2025-04-11 DIAGNOSIS — Z95.0 STATUS POST PLACEMENT OF CARDIAC PACEMAKER: ICD-10-CM

## 2025-04-11 DIAGNOSIS — Z86.79 S/P ABLATION OF ATRIAL FIBRILLATION: ICD-10-CM

## 2025-04-11 DIAGNOSIS — R06.02 SHORTNESS OF BREATH: ICD-10-CM

## 2025-04-11 DIAGNOSIS — I48.0 PAF (PAROXYSMAL ATRIAL FIBRILLATION) (HCC): Primary | ICD-10-CM

## 2025-04-11 DIAGNOSIS — I10 ESSENTIAL HYPERTENSION: ICD-10-CM

## 2025-04-11 DIAGNOSIS — I35.0 NONRHEUMATIC AORTIC VALVE STENOSIS: ICD-10-CM

## 2025-04-11 DIAGNOSIS — Z98.890 S/P ABLATION OF ATRIAL FIBRILLATION: ICD-10-CM

## 2025-04-11 RX ORDER — AMLODIPINE BESYLATE 10 MG/1
5 TABLET ORAL DAILY
Qty: 30 TABLET | Refills: 6 | Status: SHIPPED
Start: 2025-04-11 | End: 2025-04-11 | Stop reason: DRUGHIGH

## 2025-04-11 RX ORDER — AZELASTINE 1 MG/ML
1 SPRAY, METERED NASAL 2 TIMES DAILY
COMMUNITY

## 2025-04-11 RX ORDER — AMLODIPINE BESYLATE 5 MG/1
5 TABLET ORAL DAILY
Qty: 30 TABLET | Refills: 6 | Status: SHIPPED
Start: 2025-04-11

## 2025-04-11 NOTE — PROGRESS NOTES
OFFICE VISIT     PRIMARY CARE PHYSICIAN:      Rustam Pastrana DO         REVIEWED MEDICATIONS:        Current Outpatient Medications:     azelastine (ASTELIN) 0.1 % nasal spray, 1 spray by Nasal route 2 times daily Use in each nostril as directed, Disp: , Rfl:     ipratropium (ATROVENT) 0.06 % nasal spray, Ipratropium Bromide 42 mcg (0.06 %) spray,non-aerosol Active NASAL January 3rd, 2025 1:00am, Disp: , Rfl:     colchicine (COLCRYS) 0.6 MG tablet, Take 1 tablet by mouth daily, Disp: 45 tablet, Rfl: 3    metFORMIN (GLUCOPHAGE) 1000 MG tablet, TAKE 1 TABLET TWICE A DAY, Disp: 180 tablet, Rfl: 3    fluticasone (FLONASE) 50 MCG/ACT nasal spray, 2 sprays by Each Nostril route daily, Disp: 3 each, Rfl: 3    tamsulosin (FLOMAX) 0.4 MG capsule, Take 1 capsule by mouth daily, Disp: 90 capsule, Rfl: 3    allopurinol (ZYLOPRIM) 100 MG tablet, Take 2 tablets by mouth daily, Disp: 180 tablet, Rfl: 3    finasteride (PROSCAR) 5 MG tablet, Take 1 tablet by mouth daily, Disp: 90 tablet, Rfl: 3    glimepiride (AMARYL) 4 MG tablet, Take 1 tablet by mouth every morning (before breakfast), Disp: 90 tablet, Rfl: 3    omeprazole (PRILOSEC) 20 MG delayed release capsule, Take 1 capsule by mouth daily, Disp: 90 capsule, Rfl: 3    Accu-Chek Softclix Lancets MISC, USE  1 x DAILY AS DIRECTED  DX:E11.9, Disp: , Rfl:     blood glucose test strips (EXACTECH TEST) strip, 1 each daily, Disp: , Rfl:     apixaban (ELIQUIS) 5 MG TABS tablet, Take 1 tablet by mouth 2 times daily, Disp: 60 tablet, Rfl: 0    Multiple Vitamins-Minerals (CENTRUM ULTRA MENS) TABS, Take 1 tablet by mouth daily , Disp: , Rfl:     amLODIPine (NORVASC) 5 MG tablet, Take 1 tablet by mouth daily, Disp: 30 tablet, Rfl: 6    metoprolol succinate (TOPROL XL) 25 MG extended release tablet, Take 1 tablet by mouth daily (Patient not taking: Reported on 4/11/2025), Disp: 30 tablet, Rfl: 0      S: REASON FOR VISIT:       Chief Complaint   Patient presents with    Atrial Fibrillation

## 2025-04-14 ENCOUNTER — TELEPHONE (OUTPATIENT)
Age: 80
End: 2025-04-14

## 2025-04-14 DIAGNOSIS — M17.12 OSTEOARTHRITIS OF LEFT KNEE, UNSPECIFIED OSTEOARTHRITIS TYPE: Primary | ICD-10-CM

## 2025-04-14 DIAGNOSIS — N18.32 STAGE 3B CHRONIC KIDNEY DISEASE (HCC): ICD-10-CM

## 2025-04-14 NOTE — TELEPHONE ENCOUNTER
Pratibha called stating that [facility] and [facility] will now need referrals to Dr. Zambrano LEFT knee and Kidney Dr.  Labs in basket.

## 2025-04-15 DIAGNOSIS — I10 ESSENTIAL HYPERTENSION: ICD-10-CM

## 2025-04-15 DIAGNOSIS — E11.9 TYPE II DIABETES MELLITUS WITH HBA1C GOAL TO BE DETERMINED (HCC): ICD-10-CM

## 2025-04-30 NOTE — PROGRESS NOTES
Transportation     Lack of Transportation (Medical): No     Lack of Transportation (Non-Medical): No   Physical Activity: Inactive (4/1/2025)    Exercise Vital Sign     Days of Exercise per Week: 0 days     Minutes of Exercise per Session: 0 min   Stress: Not on file   Social Connections: Not on file   Intimate Partner Violence: Not on file   Housing Stability: Low Risk  (4/1/2025)    Housing Stability Vital Sign     Unable to Pay for Housing in the Last Year: No     Number of Times Moved in the Last Year: 0     Homeless in the Last Year: No       Family History:  Family History   Problem Relation Age of Onset    Diabetes Mother     High Blood Pressure Mother     Stroke Mother 65    Diabetes Father     Other Father 72        CHF    Stroke Father     Early Death Sister     Cancer Brother         bladder    No Known Problems Sister        Objective:  There were no vitals filed for this visit.  General Appearance: Pleasant 79 y.o. year old male who appears stated age.  Communicates well, no acute distress.    HEENT: Head is normocephalic, atraumatic.  EOMs intact, PERRL.  Trachea midline.   Lungs: Normal respiratory rate and normal effort. He is not in respiratory distress. Breath sounds clear to auscultation. No wheezes.   Heart: Normal rate. Regular rhythm. S1 normal and S2 normal. Positive for murmur.   Chest: Symmetric chest wall expansion.   Extremities: Normal range of motion.     Neurological: Patient is alert and oriented to person, place and time.  Skin: Warm and dry.   Abdomen: Abdomen is soft and non-distended. Bowel sounds are normal.   Pulses: Distal pulses are intact.  Skin: Warm and dry without lesions.        Assessment:   Symptomatic, moderate-severe aortic stenosis    Plan: Recommend RHC/LHC with invasive gradients as well as CTA TAVR  Will discuss in heart team conference    Jyoti Vergara DO  Cardiothoracic Surgery

## 2025-04-30 NOTE — PROGRESS NOTES
OFFICE VISIT    NAME: Manny Ceballos     YOB: 1945   AGE: 79 y.o.   MEDICAL RECORD NUMBER: [id number]       CONSULTATION INFORMATION:   DATE OF CLINIC CONSULTATION: 5/1/2025   PRINCIPLE DIAGNOSIS / reason for visit: AS    CARE TEAM MEMBERS    PCP : Rustam Pastrana DO     PRIMARY CARDIOLOGIST: Dr. Urbina   REFERRING PROVIDER: Ana Urbina MD     HISTORY OF PRESENT ILLNESS:   Manny Ceballos is a 79 y.o. male referred by Dr. Urbina for AS. Past medical history of AFib, pacemaker, HTN, HLD, DM, GERD/Alonzo's esophagus    He was last seen by Dr. Urbina on 4/17/25 where he reported mild GRANGER but no other cardiac complaints. He was referred for further evaluation of possible TAVR.    Patient presents today with his wife.  He reports that he has been having symptoms of dyspnea with exertion along with lower extremity edema.  However he is not sure if his dyspnea with exertion is related to his valve or related to his sinus problems that he has been having.  He denies having chest pain.    TTE 2/17/25:    Left Ventricle: Normal left ventricular systolic function with a EF of 60%. Left ventricle size is normal. Moderate concentric hypertrophy. Normal wall motion. Grade II diastolic dysfunction with increased LAP.    Left Atrium: Left atrium is mildly dilated. Left atrial volume index is severely increased (>48 mL/m2).    Right Ventricle: Normal systolic function. TAPSE is normal.    Aortic Valve: Moderate to severe stenosis - peak gradient 48mmHg, mean 29mmHg, NOÉ 0.9cm2 by VTI and Vmax; Peak Velocity index 0.29, DVI 0.28.    Mitral Valve: Mildly thickened leaflets.    Tricuspid Valve: Mild regurgitation, RVSP 36mmHg.    Pericardium: No pericardial effusion.    Prior study done 5/16/2023.        PAST HISTORY:   Past Medical History:   Diagnosis Date    Arthritis     Atrial fibrillation (HCC)     Alonzo's esophagus     BPH (benign prostatic hypertrophy)     Diverticulosis     Gastritis

## 2025-05-01 ENCOUNTER — OFFICE VISIT (OUTPATIENT)
Dept: CARDIOTHORACIC SURGERY | Age: 80
End: 2025-05-01
Payer: MEDICARE

## 2025-05-01 ENCOUNTER — OFFICE VISIT (OUTPATIENT)
Dept: CARDIOLOGY CLINIC | Age: 80
End: 2025-05-01
Payer: MEDICARE

## 2025-05-01 VITALS — DIASTOLIC BLOOD PRESSURE: 76 MMHG | SYSTOLIC BLOOD PRESSURE: 130 MMHG

## 2025-05-01 DIAGNOSIS — I35.0 NONRHEUMATIC AORTIC VALVE STENOSIS: Primary | ICD-10-CM

## 2025-05-01 DIAGNOSIS — I35.0 AORTIC VALVE STENOSIS, ETIOLOGY OF CARDIAC VALVE DISEASE UNSPECIFIED: Primary | ICD-10-CM

## 2025-05-01 PROCEDURE — 1036F TOBACCO NON-USER: CPT | Performed by: STUDENT IN AN ORGANIZED HEALTH CARE EDUCATION/TRAINING PROGRAM

## 2025-05-01 PROCEDURE — 1123F ACP DISCUSS/DSCN MKR DOCD: CPT | Performed by: STUDENT IN AN ORGANIZED HEALTH CARE EDUCATION/TRAINING PROGRAM

## 2025-05-01 PROCEDURE — 1159F MED LIST DOCD IN RCRD: CPT | Performed by: INTERNAL MEDICINE

## 2025-05-01 PROCEDURE — 99204 OFFICE O/P NEW MOD 45 MIN: CPT | Performed by: STUDENT IN AN ORGANIZED HEALTH CARE EDUCATION/TRAINING PROGRAM

## 2025-05-01 PROCEDURE — 1159F MED LIST DOCD IN RCRD: CPT | Performed by: STUDENT IN AN ORGANIZED HEALTH CARE EDUCATION/TRAINING PROGRAM

## 2025-05-01 PROCEDURE — 1123F ACP DISCUSS/DSCN MKR DOCD: CPT | Performed by: INTERNAL MEDICINE

## 2025-05-01 PROCEDURE — 3078F DIAST BP <80 MM HG: CPT | Performed by: INTERNAL MEDICINE

## 2025-05-01 PROCEDURE — G8417 CALC BMI ABV UP PARAM F/U: HCPCS | Performed by: INTERNAL MEDICINE

## 2025-05-01 PROCEDURE — G2211 COMPLEX E/M VISIT ADD ON: HCPCS | Performed by: INTERNAL MEDICINE

## 2025-05-01 PROCEDURE — G8427 DOCREV CUR MEDS BY ELIG CLIN: HCPCS | Performed by: INTERNAL MEDICINE

## 2025-05-01 PROCEDURE — 1036F TOBACCO NON-USER: CPT | Performed by: INTERNAL MEDICINE

## 2025-05-01 PROCEDURE — G8417 CALC BMI ABV UP PARAM F/U: HCPCS | Performed by: STUDENT IN AN ORGANIZED HEALTH CARE EDUCATION/TRAINING PROGRAM

## 2025-05-01 PROCEDURE — 99205 OFFICE O/P NEW HI 60 MIN: CPT | Performed by: INTERNAL MEDICINE

## 2025-05-01 PROCEDURE — G8428 CUR MEDS NOT DOCUMENT: HCPCS | Performed by: STUDENT IN AN ORGANIZED HEALTH CARE EDUCATION/TRAINING PROGRAM

## 2025-05-01 PROCEDURE — 3075F SYST BP GE 130 - 139MM HG: CPT | Performed by: INTERNAL MEDICINE

## 2025-05-03 ENCOUNTER — TELEPHONE (OUTPATIENT)
Dept: CARDIOLOGY CLINIC | Age: 80
End: 2025-05-03

## 2025-05-03 NOTE — TELEPHONE ENCOUNTER
HEART CATH INSTRUCTIONS    I called & spoke to Pratibha; Neno's wife to go over his heart cath instructions  Place: [facility]  Date & Time: 5/5/25 at 1:00 pm  Arrival Time: 11:00 am  Preop Labs: to be done day of procedure since you can't get them before   NPO post midnight the night before cath  Take the following meds am of procedure:  Amlodipine, Metoprolol, Tamsulosin, Finasteride &  mg 1 tab   Hold ALL of your other medications   Take your last dose of Eliquis on 5/2  Metformin: Take your last dose on  5/4 & hold it for 2 days after the cath & resume it on 5/8  Must have a ride home after the procedure  More detailed instructions  sent to Pratibha's phone and will be scanned into Epic

## 2025-05-05 ENCOUNTER — HOSPITAL ENCOUNTER (OUTPATIENT)
Age: 80
Discharge: HOME | End: 2025-05-05
Attending: INTERNAL MEDICINE | Admitting: INTERNAL MEDICINE
Payer: MEDICARE

## 2025-05-05 VITALS
HEIGHT: 72 IN | WEIGHT: 240 LBS | HEART RATE: 63 BPM | OXYGEN SATURATION: 100 % | TEMPERATURE: 97.3 F | DIASTOLIC BLOOD PRESSURE: 72 MMHG | RESPIRATION RATE: 24 BRPM | BODY MASS INDEX: 32.51 KG/M2 | SYSTOLIC BLOOD PRESSURE: 149 MMHG

## 2025-05-05 DIAGNOSIS — I35.0 NODULAR CALCIFIC AORTIC VALVE STENOSIS: ICD-10-CM

## 2025-05-05 PROBLEM — Z98.890 S/P CARDIAC CATH: Status: ACTIVE | Noted: 2025-05-05

## 2025-05-05 LAB
ABO + RH BLD: NORMAL
ANION GAP SERPL CALCULATED.3IONS-SCNC: 12 MMOL/L (ref 7–16)
ARM BAND NUMBER: NORMAL
BLOOD BANK SAMPLE EXPIRATION: NORMAL
BLOOD GROUP ANTIBODIES SERPL: NEGATIVE
BUN SERPL-MCNC: 21 MG/DL (ref 8–23)
CALCIUM SERPL-MCNC: 9.6 MG/DL (ref 8.8–10.2)
CHLORIDE SERPL-SCNC: 103 MMOL/L (ref 98–107)
CO2 SERPL-SCNC: 26 MMOL/L (ref 22–29)
CREAT SERPL-MCNC: 1.2 MG/DL (ref 0.7–1.2)
ECHO BSA: 2.35 M2
ERYTHROCYTE [DISTWIDTH] IN BLOOD BY AUTOMATED COUNT: 13.5 % (ref 11.5–15)
GFR, ESTIMATED: 61 ML/MIN/1.73M2
GLUCOSE SERPL-MCNC: 143 MG/DL (ref 74–99)
HCT VFR BLD AUTO: 39.1 % (ref 37–54)
HGB BLD-MCNC: 13.2 G/DL (ref 12.5–16.5)
INR PPP: 1.2
MCH RBC QN AUTO: 33.4 PG (ref 26–35)
MCHC RBC AUTO-ENTMCNC: 33.8 G/DL (ref 32–34.5)
MCV RBC AUTO: 99 FL (ref 80–99.9)
PLATELET # BLD AUTO: 152 K/UL (ref 130–450)
PMV BLD AUTO: 11 FL (ref 7–12)
POTASSIUM SERPL-SCNC: 4.3 MMOL/L (ref 3.5–5.1)
PROTHROMBIN TIME: 12.5 SEC (ref 9.3–12.4)
RBC # BLD AUTO: 3.95 M/UL (ref 3.8–5.8)
SODIUM SERPL-SCNC: 141 MMOL/L (ref 136–145)
WBC OTHER # BLD: 5 K/UL (ref 4.5–11.5)

## 2025-05-05 PROCEDURE — 6360000004 HC RX CONTRAST MEDICATION: Performed by: INTERNAL MEDICINE

## 2025-05-05 PROCEDURE — 93460 R&L HRT ART/VENTRICLE ANGIO: CPT | Performed by: INTERNAL MEDICINE

## 2025-05-05 PROCEDURE — C1769 GUIDE WIRE: HCPCS | Performed by: INTERNAL MEDICINE

## 2025-05-05 PROCEDURE — 99152 MOD SED SAME PHYS/QHP 5/>YRS: CPT | Performed by: INTERNAL MEDICINE

## 2025-05-05 PROCEDURE — 86901 BLOOD TYPING SEROLOGIC RH(D): CPT

## 2025-05-05 PROCEDURE — C1751 CATH, INF, PER/CENT/MIDLINE: HCPCS | Performed by: INTERNAL MEDICINE

## 2025-05-05 PROCEDURE — 7100000010 HC PHASE II RECOVERY - FIRST 15 MIN: Performed by: INTERNAL MEDICINE

## 2025-05-05 PROCEDURE — 2500000003 HC RX 250 WO HCPCS: Performed by: INTERNAL MEDICINE

## 2025-05-05 PROCEDURE — 6360000002 HC RX W HCPCS: Performed by: INTERNAL MEDICINE

## 2025-05-05 PROCEDURE — C1887 CATHETER, GUIDING: HCPCS | Performed by: INTERNAL MEDICINE

## 2025-05-05 PROCEDURE — 85027 COMPLETE CBC AUTOMATED: CPT

## 2025-05-05 PROCEDURE — 86850 RBC ANTIBODY SCREEN: CPT

## 2025-05-05 PROCEDURE — 80048 BASIC METABOLIC PNL TOTAL CA: CPT

## 2025-05-05 PROCEDURE — 85610 PROTHROMBIN TIME: CPT

## 2025-05-05 PROCEDURE — C1894 INTRO/SHEATH, NON-LASER: HCPCS | Performed by: INTERNAL MEDICINE

## 2025-05-05 PROCEDURE — 2709999900 HC NON-CHARGEABLE SUPPLY: Performed by: INTERNAL MEDICINE

## 2025-05-05 PROCEDURE — 86900 BLOOD TYPING SEROLOGIC ABO: CPT

## 2025-05-05 RX ORDER — NITROGLYCERIN 20 MG/100ML
INJECTION INTRAVENOUS CONTINUOUS PRN
Status: COMPLETED | OUTPATIENT
Start: 2025-05-05 | End: 2025-05-05

## 2025-05-05 RX ORDER — SODIUM CHLORIDE 9 MG/ML
INJECTION, SOLUTION INTRAVENOUS CONTINUOUS
Status: DISCONTINUED | OUTPATIENT
Start: 2025-05-05 | End: 2025-05-05 | Stop reason: HOSPADM

## 2025-05-05 RX ORDER — ASPIRIN/CALCIUM/MAG/ALUMINUM 325 MG
TABLET ORAL
COMMUNITY

## 2025-05-05 RX ORDER — SODIUM CHLORIDE 0.9 % (FLUSH) 0.9 %
5-40 SYRINGE (ML) INJECTION PRN
Status: DISCONTINUED | OUTPATIENT
Start: 2025-05-05 | End: 2025-05-05 | Stop reason: HOSPADM

## 2025-05-05 RX ORDER — HEPARIN SODIUM 10000 [USP'U]/ML
INJECTION, SOLUTION INTRAVENOUS; SUBCUTANEOUS PRN
Status: DISCONTINUED | OUTPATIENT
Start: 2025-05-05 | End: 2025-05-05 | Stop reason: HOSPADM

## 2025-05-05 RX ORDER — MIDAZOLAM HYDROCHLORIDE 1 MG/ML
INJECTION, SOLUTION INTRAMUSCULAR; INTRAVENOUS PRN
Status: DISCONTINUED | OUTPATIENT
Start: 2025-05-05 | End: 2025-05-05 | Stop reason: HOSPADM

## 2025-05-05 RX ORDER — SODIUM CHLORIDE 9 MG/ML
INJECTION, SOLUTION INTRAVENOUS PRN
Status: DISCONTINUED | OUTPATIENT
Start: 2025-05-05 | End: 2025-05-05 | Stop reason: HOSPADM

## 2025-05-05 RX ORDER — IOPAMIDOL 755 MG/ML
INJECTION, SOLUTION INTRAVASCULAR PRN
Status: DISCONTINUED | OUTPATIENT
Start: 2025-05-05 | End: 2025-05-05 | Stop reason: HOSPADM

## 2025-05-05 RX ORDER — VERAPAMIL HYDROCHLORIDE 2.5 MG/ML
INJECTION INTRAVENOUS PRN
Status: DISCONTINUED | OUTPATIENT
Start: 2025-05-05 | End: 2025-05-05 | Stop reason: HOSPADM

## 2025-05-05 RX ORDER — SODIUM CHLORIDE 0.9 % (FLUSH) 0.9 %
5-40 SYRINGE (ML) INJECTION EVERY 12 HOURS SCHEDULED
Status: DISCONTINUED | OUTPATIENT
Start: 2025-05-05 | End: 2025-05-05 | Stop reason: HOSPADM

## 2025-05-05 RX ORDER — FENTANYL CITRATE 50 UG/ML
INJECTION, SOLUTION INTRAMUSCULAR; INTRAVENOUS PRN
Status: DISCONTINUED | OUTPATIENT
Start: 2025-05-05 | End: 2025-05-05 | Stop reason: HOSPADM

## 2025-05-05 RX ORDER — ACETAMINOPHEN 325 MG/1
650 TABLET ORAL EVERY 4 HOURS PRN
Status: DISCONTINUED | OUTPATIENT
Start: 2025-05-05 | End: 2025-05-05 | Stop reason: HOSPADM

## 2025-05-05 ASSESSMENT — PAIN SCALES - GENERAL: PAINLEVEL_OUTOF10: 0

## 2025-05-05 NOTE — H&P
History & Physical     CHIEF COMPLAINT: AS      DATE OF SERVICE: 5/5/2025     HISTORY OF PRESENT ILLNESS:    79 y.o. male referred by Dr. Urbina for AS. Past medical history of AFib, pacemaker, HTN, HLD, DM, GERD/Alonzo's esophagus Patient presents for left and right heart catheterization.              Past Medical History:  Past Medical History:   Diagnosis Date    Arthritis     Atrial fibrillation (HCC)     Alonzo's esophagus     BPH (benign prostatic hypertrophy)     Diverticulosis     Gastritis     GERD (gastroesophageal reflux disease)     H/O non-insulin dependent diabetes mellitus     Hemorrhoids, internal     Hernia, hiatal     HTN (hypertension)     Hyperlipidemia     Hypertension     Mobitz type 1 second degree atrioventricular block     Nonalcoholic fatty liver disease     Obesity     Pneumonia     Polyp of colon     Type II diabetes mellitus with HbA1C goal to be determined (HCC)        Past Surgical History:   Past Surgical History:   Procedure Laterality Date    ABLATION OF DYSRHYTHMIC FOCUS  01/05/2024    Dr. Quigley    CARDIOVERSION  08/08/2023    Successful  Dr. Quigley    CARDIOVERSION  11/16/2023    Successful  Dr. Quigley    COLONOSCOPY  03/14/2014    ENDOSCOPY, COLON, DIAGNOSTIC      EP DEVICE PROCEDURE N/A 01/05/2024    Ablation A-fib w complete ep study performed by Rusty Quigley DO at [facility] CARDIAC CATH LAB    EYE LID SURGERY  12/03/2009    blepharoplasty both eyelids    HERNIA REPAIR  01/12/2011    KNEE ARTHROPLASTY  02/15/2010    KNEE SURGERY Left     NOSE SURGERY N/A 09/29/2023    INCISION AND DRAINAGE OF NASAL ABSCESS AND NASAL EXPLORATION performed by Daniel Fajardo DO at [facility] OR    PACEMAKER INSERTION  05/31/2023    [employer] Dual PPM  Dr. Quigley    POLYPECTOMY  04/28/2011    TOENAIL EXCISION Left     UPPER GASTROINTESTINAL ENDOSCOPY  04/28/2011        Home Medications:    Prior to Admission medications    Medication Sig Start Date End Date Taking?

## 2025-05-05 NOTE — PROGRESS NOTES
Monitor dcd cleaned and placed in slot in nurses station. Post cardiac cath wrist instructions reviewed with patient and handout given. Discharge instructions and meds reviewed with patient. Told to resume metformin on 5/8. Eliquis tonight per dr dudley. Left in wheelchair with all belongings

## 2025-05-06 ENCOUNTER — OFFICE VISIT (OUTPATIENT)
Dept: ENT CLINIC | Age: 80
End: 2025-05-06
Payer: MEDICARE

## 2025-05-06 VITALS
SYSTOLIC BLOOD PRESSURE: 135 MMHG | WEIGHT: 233.1 LBS | DIASTOLIC BLOOD PRESSURE: 79 MMHG | RESPIRATION RATE: 16 BRPM | BODY MASS INDEX: 31.57 KG/M2 | HEART RATE: 67 BPM | HEIGHT: 72 IN | OXYGEN SATURATION: 93 %

## 2025-05-06 DIAGNOSIS — J34.3 NASAL TURBINATE HYPERTROPHY: ICD-10-CM

## 2025-05-06 DIAGNOSIS — J34.0 NASAL SEPTAL ABSCESS: ICD-10-CM

## 2025-05-06 DIAGNOSIS — H90.3 SENSORINEURAL HEARING LOSS (SNHL) OF BOTH EARS: Primary | ICD-10-CM

## 2025-05-06 DIAGNOSIS — H61.23 BILATERAL IMPACTED CERUMEN: ICD-10-CM

## 2025-05-06 DIAGNOSIS — J34.89 NASAL OBSTRUCTION: ICD-10-CM

## 2025-05-06 DIAGNOSIS — J30.0 VASOMOTOR RHINITIS: ICD-10-CM

## 2025-05-06 PROCEDURE — 69210 REMOVE IMPACTED EAR WAX UNI: CPT | Performed by: OTOLARYNGOLOGY

## 2025-05-06 PROCEDURE — G8417 CALC BMI ABV UP PARAM F/U: HCPCS | Performed by: OTOLARYNGOLOGY

## 2025-05-06 PROCEDURE — 3075F SYST BP GE 130 - 139MM HG: CPT | Performed by: OTOLARYNGOLOGY

## 2025-05-06 PROCEDURE — 1123F ACP DISCUSS/DSCN MKR DOCD: CPT | Performed by: OTOLARYNGOLOGY

## 2025-05-06 PROCEDURE — G8427 DOCREV CUR MEDS BY ELIG CLIN: HCPCS | Performed by: OTOLARYNGOLOGY

## 2025-05-06 PROCEDURE — 1036F TOBACCO NON-USER: CPT | Performed by: OTOLARYNGOLOGY

## 2025-05-06 PROCEDURE — 1159F MED LIST DOCD IN RCRD: CPT | Performed by: OTOLARYNGOLOGY

## 2025-05-06 PROCEDURE — 3078F DIAST BP <80 MM HG: CPT | Performed by: OTOLARYNGOLOGY

## 2025-05-06 PROCEDURE — 1160F RVW MEDS BY RX/DR IN RCRD: CPT | Performed by: OTOLARYNGOLOGY

## 2025-05-06 PROCEDURE — 99213 OFFICE O/P EST LOW 20 MIN: CPT | Performed by: OTOLARYNGOLOGY

## 2025-05-06 ASSESSMENT — ENCOUNTER SYMPTOMS
EYE DISCHARGE: 0
SHORTNESS OF BREATH: 0
DIARRHEA: 0
CHEST TIGHTNESS: 0
SINUS PRESSURE: 1
RESPIRATORY NEGATIVE: 1
COLOR CHANGE: 0
EYE PAIN: 0
GASTROINTESTINAL NEGATIVE: 1
VOMITING: 0
ABDOMINAL PAIN: 0
APNEA: 0
SINUS PAIN: 1
FACIAL SWELLING: 1
EYES NEGATIVE: 1

## 2025-05-06 NOTE — PROGRESS NOTES
Subjective:      Patient ID:  Manny Ceballos is a 79 y.o. male.    HPI:  Pt returns for recheck of allergies.       History of   Septal abscess  When? October year: 2023    Nasal Steroid: yes   Name: fluticasone (Flonase)   Still taking: yes   reason for stopping:     Other therapy:     Atrovent spray  Astelin- no  oral antihistamine- none  leukotriene inhibitor- none  oral decongestant- none    which has been  somewhat effective     Pt has been on therapy for 6 month(s) and is doing adequately        The patient is complaining of nasal congestion and rhinorrhea    The patients worst time of year is all seasons      Patient's medications, allergies, past medical, surgical, social and family histories were reviewed and updated as appropriate.        Review of Systems   Constitutional: Negative.  Negative for appetite change, chills and fever.   HENT:  Positive for congestion, facial swelling, sinus pressure and sinus pain.    Eyes: Negative.  Negative for pain, discharge and visual disturbance.   Respiratory: Negative.  Negative for apnea, chest tightness and shortness of breath.    Cardiovascular: Negative.  Negative for chest pain, palpitations and leg swelling.   Gastrointestinal: Negative.  Negative for abdominal pain, diarrhea and vomiting.   Endocrine: Negative for cold intolerance, heat intolerance and polydipsia.   Genitourinary: Negative.  Negative for dysuria, flank pain and hematuria.   Musculoskeletal: Negative.  Negative for arthralgias, gait problem and neck pain.   Skin: Negative.  Negative for color change, pallor and rash.   Allergic/Immunologic: Negative for environmental allergies, food allergies and immunocompromised state.   Neurological: Negative.  Negative for dizziness, numbness and headaches.   Hematological:  Negative for adenopathy.   Psychiatric/Behavioral: Negative.  Negative for behavioral problems and hallucinations.    All other systems reviewed and are negative.              Objective:

## 2025-05-07 ENCOUNTER — TELEPHONE (OUTPATIENT)
Dept: ENT CLINIC | Age: 80
End: 2025-05-07

## 2025-05-07 DIAGNOSIS — I35.0 NONRHEUMATIC AORTIC VALVE STENOSIS: Primary | ICD-10-CM

## 2025-05-07 NOTE — TELEPHONE ENCOUNTER
[facility] to authorize order with patient insurance. Patient is scheduled for Sinus CT with radiology on 6/3/25 @ 4:00pm. Patient has been notified of date and time and that they need to arrive at 3:30pm. Patient was informed he has no prep prior to procedure. Patient instructed to park in [facility] parking lot and report to registration. Patient's wife verbalized understanding but states she will be rescheduling the appt due to being on vacation at that time. Phone number for radiology scheduling given to Pratibha.     Electronically signed by Amalia Monahan MA on 5/7/25 at 8:52 AM EDT

## 2025-05-09 ENCOUNTER — CLINICAL DOCUMENTATION (OUTPATIENT)
Dept: CARDIOLOGY | Age: 80
End: 2025-05-09

## 2025-05-09 ENCOUNTER — TELEPHONE (OUTPATIENT)
Dept: CARDIOLOGY | Age: 80
End: 2025-05-09

## 2025-05-09 NOTE — TELEPHONE ENCOUNTER
Called patient re: TAVR CTA scheduled 5/13/25. Asked him to call me back at earliest convenience. Phone number provided.

## 2025-05-09 NOTE — PROGRESS NOTES
Patient's wife called back to discuss TAVR CTA scheduled on 5/13/25. Instructed on arrival time 1300, NPO after 1030 and CT 1330. Advised her to have him hold metformin on 5/13, 5/14, 5/15; resume on 5/16/25. She states understanding. Further recommendations pending heart team review.

## 2025-05-12 ENCOUNTER — TRANSCRIBE ORDERS (OUTPATIENT)
Dept: ADMINISTRATIVE | Age: 80
End: 2025-05-12

## 2025-05-12 DIAGNOSIS — N18.6 TYPE 2 DIABETES MELLITUS WITH ESRD (END-STAGE RENAL DISEASE) (HCC): ICD-10-CM

## 2025-05-12 DIAGNOSIS — Z95.0 PACEMAKER: ICD-10-CM

## 2025-05-12 DIAGNOSIS — N40.0 BENIGN PROSTATIC HYPERPLASIA, UNSPECIFIED WHETHER LOWER URINARY TRACT SYMPTOMS PRESENT: ICD-10-CM

## 2025-05-12 DIAGNOSIS — E11.22 TYPE 2 DIABETES MELLITUS WITH ESRD (END-STAGE RENAL DISEASE) (HCC): ICD-10-CM

## 2025-05-12 DIAGNOSIS — I48.91 ATRIAL FIBRILLATION, UNSPECIFIED TYPE (HCC): ICD-10-CM

## 2025-05-12 DIAGNOSIS — N18.31 CHRONIC KIDNEY DISEASE (CKD) STAGE G3A/A1, MODERATELY DECREASED GLOMERULAR FILTRATION RATE (GFR) BETWEEN 45-59 ML/MIN/1.73 SQUARE METER AND ALBUMINURIA CREATININE RATIO LESS THAN 30 MG/G (HCC): Primary | ICD-10-CM

## 2025-05-12 DIAGNOSIS — I35.0 NODULAR CALCIFIC AORTIC VALVE STENOSIS: ICD-10-CM

## 2025-06-06 ENCOUNTER — HOSPITAL ENCOUNTER (OUTPATIENT)
Dept: CT IMAGING | Age: 80
Discharge: HOME | End: 2025-06-08
Attending: INTERNAL MEDICINE
Payer: MEDICARE

## 2025-06-06 ENCOUNTER — CLINICAL DOCUMENTATION (OUTPATIENT)
Dept: CARDIOLOGY | Age: 80
End: 2025-06-06

## 2025-06-06 ENCOUNTER — HOSPITAL ENCOUNTER (OUTPATIENT)
Age: 80
Discharge: HOME | End: 2025-06-06
Attending: INTERNAL MEDICINE
Payer: MEDICARE

## 2025-06-06 DIAGNOSIS — I10 ESSENTIAL HYPERTENSION: ICD-10-CM

## 2025-06-06 DIAGNOSIS — I35.0 NONRHEUMATIC AORTIC VALVE STENOSIS: ICD-10-CM

## 2025-06-06 DIAGNOSIS — I48.0 PAF (PAROXYSMAL ATRIAL FIBRILLATION) (HCC): ICD-10-CM

## 2025-06-06 DIAGNOSIS — I48.91 AF (ATRIAL FIBRILLATION) (HCC): ICD-10-CM

## 2025-06-06 DIAGNOSIS — I48.91 AF (ATRIAL FIBRILLATION) (HCC): Primary | ICD-10-CM

## 2025-06-06 LAB
ANION GAP SERPL CALCULATED.3IONS-SCNC: 12 MMOL/L (ref 7–16)
BUN SERPL-MCNC: 18 MG/DL (ref 8–23)
CALCIUM SERPL-MCNC: 9.5 MG/DL (ref 8.8–10.2)
CHLORIDE SERPL-SCNC: 105 MMOL/L (ref 98–107)
CO2 SERPL-SCNC: 24 MMOL/L (ref 22–29)
CREAT SERPL-MCNC: 1.2 MG/DL (ref 0.7–1.2)
GFR, ESTIMATED: 63 ML/MIN/1.73M2
GLUCOSE SERPL-MCNC: 167 MG/DL (ref 74–99)
POTASSIUM SERPL-SCNC: 4.4 MMOL/L (ref 3.5–5.1)
SODIUM SERPL-SCNC: 141 MMOL/L (ref 136–145)

## 2025-06-06 PROCEDURE — 74174 CTA ABD&PLVS W/CONTRAST: CPT

## 2025-06-06 PROCEDURE — 6360000004 HC RX CONTRAST MEDICATION: Performed by: RADIOLOGY

## 2025-06-06 PROCEDURE — 71275 CT ANGIOGRAPHY CHEST: CPT

## 2025-06-06 PROCEDURE — 80048 BASIC METABOLIC PNL TOTAL CA: CPT

## 2025-06-06 PROCEDURE — 75572 CT HRT W/3D IMAGE: CPT

## 2025-06-06 PROCEDURE — 36415 COLL VENOUS BLD VENIPUNCTURE: CPT

## 2025-06-06 RX ORDER — IOPAMIDOL 755 MG/ML
100 INJECTION, SOLUTION INTRAVASCULAR
Status: COMPLETED | OUTPATIENT
Start: 2025-06-06 | End: 2025-06-06

## 2025-06-06 RX ORDER — SODIUM CHLORIDE 0.9 % (FLUSH) 0.9 %
10 SYRINGE (ML) INJECTION
Status: DISCONTINUED | OUTPATIENT
Start: 2025-06-06 | End: 2025-06-09 | Stop reason: HOSPADM

## 2025-06-06 RX ADMIN — IOPAMIDOL 100 ML: 755 INJECTION, SOLUTION INTRAVENOUS at 13:03

## 2025-06-06 NOTE — PROGRESS NOTES
Confirmed with patient to hold metformin after TAVR CTA performed today; may resume on 6/9/25 in the AM. He states understanding.

## 2025-06-13 ENCOUNTER — PREP FOR PROCEDURE (OUTPATIENT)
Dept: CARDIOLOGY | Age: 80
End: 2025-06-13

## 2025-06-13 DIAGNOSIS — I35.0 NODULAR CALCIFIC AORTIC VALVE STENOSIS: ICD-10-CM

## 2025-06-13 DIAGNOSIS — I35.0 NONRHEUMATIC AORTIC VALVE STENOSIS: ICD-10-CM

## 2025-06-13 DIAGNOSIS — Z95.2 HISTORY OF TRANSCATHETER AORTIC VALVE REPLACEMENT (TAVR): Primary | ICD-10-CM

## 2025-06-13 RX ORDER — SODIUM CHLORIDE 0.9 % (FLUSH) 0.9 %
5-40 SYRINGE (ML) INJECTION PRN
Status: CANCELLED | OUTPATIENT
Start: 2025-06-13

## 2025-06-13 RX ORDER — SODIUM CHLORIDE 9 MG/ML
INJECTION, SOLUTION INTRAVENOUS PRN
Status: CANCELLED | OUTPATIENT
Start: 2025-06-13

## 2025-06-13 RX ORDER — MUPIROCIN 2 %
OINTMENT (GRAM) TOPICAL ONCE
Status: CANCELLED | OUTPATIENT
Start: 2025-06-13 | End: 2025-06-13

## 2025-06-13 RX ORDER — SODIUM CHLORIDE 0.9 % (FLUSH) 0.9 %
5-40 SYRINGE (ML) INJECTION EVERY 12 HOURS SCHEDULED
Status: CANCELLED | OUTPATIENT
Start: 2025-06-13

## 2025-06-13 RX ORDER — SODIUM CHLORIDE 9 MG/ML
INJECTION, SOLUTION INTRAVENOUS CONTINUOUS
Status: CANCELLED | OUTPATIENT
Start: 2025-06-13

## 2025-06-17 RX ORDER — SENNOSIDES 8.6 MG
325 CAPSULE ORAL ONCE
Status: CANCELLED | OUTPATIENT
Start: 2025-06-17

## 2025-06-20 ENCOUNTER — HOSPITAL ENCOUNTER (OUTPATIENT)
Dept: PULMONOLOGY | Age: 80
Discharge: HOME | End: 2025-06-20
Attending: INTERNAL MEDICINE
Payer: MEDICARE

## 2025-06-20 ENCOUNTER — HOSPITAL ENCOUNTER (OUTPATIENT)
Dept: PREADMISSION TESTING | Age: 80
Discharge: HOME | End: 2025-06-20
Payer: MEDICARE

## 2025-06-20 ENCOUNTER — HOSPITAL ENCOUNTER (OUTPATIENT)
Dept: ULTRASOUND IMAGING | Age: 80
Discharge: HOME | End: 2025-06-22
Attending: INTERNAL MEDICINE
Payer: MEDICARE

## 2025-06-20 VITALS
HEIGHT: 72 IN | BODY MASS INDEX: 31.69 KG/M2 | SYSTOLIC BLOOD PRESSURE: 149 MMHG | RESPIRATION RATE: 16 BRPM | OXYGEN SATURATION: 98 % | TEMPERATURE: 98 F | WEIGHT: 234 LBS | DIASTOLIC BLOOD PRESSURE: 71 MMHG

## 2025-06-20 DIAGNOSIS — I35.0 NONRHEUMATIC AORTIC VALVE STENOSIS: ICD-10-CM

## 2025-06-20 LAB
ABO + RH BLD: NORMAL
ALBUMIN SERPL-MCNC: 3.8 G/DL (ref 3.5–5.2)
ALP SERPL-CCNC: 65 U/L (ref 40–129)
ALT SERPL-CCNC: 23 U/L (ref 0–50)
ANION GAP SERPL CALCULATED.3IONS-SCNC: 13 MMOL/L (ref 7–16)
ARM BAND NUMBER: NORMAL
AST SERPL-CCNC: 25 U/L (ref 0–50)
BILIRUB SERPL-MCNC: 0.5 MG/DL (ref 0–1.2)
BILIRUB UR QL STRIP: NEGATIVE
BLOOD BANK SAMPLE EXPIRATION: NORMAL
BLOOD GROUP ANTIBODIES SERPL: NEGATIVE
BUN SERPL-MCNC: 17 MG/DL (ref 8–23)
CALCIUM SERPL-MCNC: 9.1 MG/DL (ref 8.8–10.2)
CHLORIDE SERPL-SCNC: 104 MMOL/L (ref 98–107)
CLARITY UR: CLEAR
CO2 SERPL-SCNC: 23 MMOL/L (ref 22–29)
COLOR UR: YELLOW
COMMENT: NORMAL
CREAT SERPL-MCNC: 1.1 MG/DL (ref 0.7–1.2)
ERYTHROCYTE [DISTWIDTH] IN BLOOD BY AUTOMATED COUNT: 13.5 % (ref 11.5–15)
GFR, ESTIMATED: 68 ML/MIN/1.73M2
GLUCOSE SERPL-MCNC: 195 MG/DL (ref 74–99)
GLUCOSE UR STRIP-MCNC: NEGATIVE MG/DL
HBA1C MFR BLD: 7.2 % (ref 4–5.6)
HCT VFR BLD AUTO: 37.1 % (ref 37–54)
HGB BLD-MCNC: 12.1 G/DL (ref 12.5–16.5)
HGB UR QL STRIP.AUTO: NEGATIVE
INR PPP: 1.6
KETONES UR STRIP-MCNC: NEGATIVE MG/DL
LEUKOCYTE ESTERASE UR QL STRIP: NEGATIVE
MCH RBC QN AUTO: 32.4 PG (ref 26–35)
MCHC RBC AUTO-ENTMCNC: 32.6 G/DL (ref 32–34.5)
MCV RBC AUTO: 99.5 FL (ref 80–99.9)
NITRITE UR QL STRIP: NEGATIVE
PARTIAL THROMBOPLASTIN TIME: 35.5 SEC (ref 24.5–35.1)
PH UR STRIP: 6 [PH] (ref 5–8)
PLATELET # BLD AUTO: 153 K/UL (ref 130–450)
PMV BLD AUTO: 11.4 FL (ref 7–12)
POTASSIUM SERPL-SCNC: 4.2 MMOL/L (ref 3.5–5.1)
PROT SERPL-MCNC: 7.1 G/DL (ref 6.4–8.3)
PROT UR STRIP-MCNC: NEGATIVE MG/DL
PROTHROMBIN TIME: 17.5 SEC (ref 9.3–12.4)
RBC # BLD AUTO: 3.73 M/UL (ref 3.8–5.8)
SODIUM SERPL-SCNC: 139 MMOL/L (ref 136–145)
SP GR UR STRIP: 1.01 (ref 1–1.03)
T4 FREE SERPL-MCNC: 1.1 NG/DL (ref 0.9–1.7)
TSH SERPL DL<=0.05 MIU/L-ACNC: 3.47 UIU/ML (ref 0.27–4.2)
UROBILINOGEN UR STRIP-ACNC: 0.2 EU/DL (ref 0–1)
WBC OTHER # BLD: 4.9 K/UL (ref 4.5–11.5)

## 2025-06-20 PROCEDURE — 84439 ASSAY OF FREE THYROXINE: CPT

## 2025-06-20 PROCEDURE — 86901 BLOOD TYPING SEROLOGIC RH(D): CPT

## 2025-06-20 PROCEDURE — 83036 HEMOGLOBIN GLYCOSYLATED A1C: CPT

## 2025-06-20 PROCEDURE — 94375 RESPIRATORY FLOW VOLUME LOOP: CPT

## 2025-06-20 PROCEDURE — 85610 PROTHROMBIN TIME: CPT

## 2025-06-20 PROCEDURE — 81003 URINALYSIS AUTO W/O SCOPE: CPT

## 2025-06-20 PROCEDURE — 36415 COLL VENOUS BLD VENIPUNCTURE: CPT

## 2025-06-20 PROCEDURE — 80053 COMPREHEN METABOLIC PANEL: CPT

## 2025-06-20 PROCEDURE — 86900 BLOOD TYPING SEROLOGIC ABO: CPT

## 2025-06-20 PROCEDURE — 84443 ASSAY THYROID STIM HORMONE: CPT

## 2025-06-20 PROCEDURE — 86850 RBC ANTIBODY SCREEN: CPT

## 2025-06-20 PROCEDURE — 85027 COMPLETE CBC AUTOMATED: CPT

## 2025-06-20 PROCEDURE — 87086 URINE CULTURE/COLONY COUNT: CPT

## 2025-06-20 PROCEDURE — 85730 THROMBOPLASTIN TIME PARTIAL: CPT

## 2025-06-20 PROCEDURE — 94729 DIFFUSING CAPACITY: CPT

## 2025-06-20 PROCEDURE — 93880 EXTRACRANIAL BILAT STUDY: CPT

## 2025-06-20 PROCEDURE — 87081 CULTURE SCREEN ONLY: CPT

## 2025-06-21 LAB
MICROORGANISM SPEC CULT: ABNORMAL
MICROORGANISM SPEC CULT: NORMAL
SERVICE CMNT-IMP: ABNORMAL
SPECIMEN DESCRIPTION: ABNORMAL
SPECIMEN DESCRIPTION: NORMAL

## 2025-06-22 ENCOUNTER — ANESTHESIA EVENT (OUTPATIENT)
Dept: OPERATING ROOM | Age: 80
DRG: 267 | End: 2025-06-22
Payer: MEDICARE

## 2025-06-22 ASSESSMENT — KANSAS CITY CARDIOMYOPATHY QUESTIONNAIRE (KCCQ12)
1B. OVER THE PAST 2 WEEKS, HOW MUCH WERE YOU LIMITED BY HEART FAILURE SYMPTOMS (SHORTNESS OF BREATH OR FATIGUE) WHEN WALKING 1 BLOCK ON LEVEL GROUND: SLIGHTLY LIMITED
7. IF YOU HAD TO SPEND THE REST OF YOUR LIFE WITH YOUR HEART FAILURE THE WAY IT IS RIGHT NOW, HOW WOULD YOU FEEL ABOUT THIS?: MOSTLY SATISFIED
1A. OVER THE PAST 2 WEEKS, HOW MUCH WERE YOU LIMITED BY HEART FAILURE SYMPTOMS (SHORTNESS OF BREATH OR FATIGUE) WHEN SHOWERING OR BATHING: MODERATELY LIMITED
8C. OVER THE PAST 2 WEEKS, ON AVERAGE, HOW HAS HEART FAILURE LIMITED YOU ABILITY TO VISIT FAMILY AND FRIENDS OUR OF YOUR HOME: DID NOT LIMIT AT ALL
3. OVER THE PAST 2 WEEKS, ON AVERAGE, HOW MANY TIMES HAS FATIGUE LIMITED YOUR ABILITY TO DO WHAT YOU WANTED: 1-2 TIMES PER WEEK
8A. OVER THE PAST 2 WEEKS, ON AVERAGE, HOW HAS HEART FAILURE LIMITED YOU ABILITY TO DO HOBBIES OR RECREATIONAL ACTIVITIES: DID NOT LIMIT AT ALL
1C. OVER THE PAST 2 WEEKS, HOW MUCH WERE YOU LIMITED BY HEART FAILURE SYMPTOMS (SHORTNESS OF BREATH OR FATIGUE) WHEN HURRYING OR JOGGING (AS IF TO CATCH A BUS): QUITE A BIT LIMITED
6. OVER THE PAST 2 WEEKS, HOW MUCH HAS YOUR HEART FAILURE LIMITED YOUR ENJOYMENT OF LIFE: IT HAS NOT LIMITED MY ENJOYMENT OF LIFE AT ALL
5. OVER THE PAST 2 WEEKS, ON AVERAGE, HOW MANY TIMES HAVE YOU BEEN FORCED TO SLEEP SITTING UP IN A CHAIR OR WITH AT LEAST 3 PILLOWS TO PROP YOU UP BECAUSE OF SHORTNESS OF BREATH?: NEVER OVER THE PAST 2 WEEKS
8B. OVER THE PAST 2 WEEKS, ON AVERAGE, HOW HAS HEART FAILURE LIMITED YOU ABILITY TO WORK OR DO HOUSEHOLD CHORES: MODERATELY LIMITED
2. OVER THE PAST 2 WEEKS, HOW MANY TIMES DID YOU HAVE SWELLING IN YOUR FEET, ANKLES OR LEGS WHEN YOU WOKE UP IN THE MORNING: NEVER OVER THE PAST 2 WEEKS

## 2025-06-22 NOTE — H&P
STRUCTURAL CARDIOLOGY INPATIENT HISTORY AND PHYSICAL EXAMINATION  NOTE       PATIENT DEMOGRAPHICS:       NAME: Manny Ceballos   YOB: 1945   AGE: 79 y.o.   MEDICAL RECORD NUMBER: [id number]           ADMISSION INFORMATION:  DATE OF ADMISSION: 6/25/2025     PRINCIPLE DIAGNOSIS: Severe aortic stenosis         CARE TEAM MEMBERS:   PCP : Rustam Pastrana DO     PRIMARY CARDIOLOGIST: Dr. Urbina    REFERRING PROVIDER: Dr. Urbina           HISTORY OF PRESENT ILLNESS:    Manny Ceballos is a 79 y.o. male referred by Dr. Urbina who presents today for planned Transcatheter Aortic Valve Replacement (TAVR). Past medical history of AFib, pacemaker, HTN, HLD, DM, GERD/Alonzo's esophagus         Last seen in structural clinic on 5/1/25. Per last clinic note:      He was last seen by Dr. Urbina on 4/17/25 where he reported mild GRANGER but no other cardiac complaints. He was referred for further evaluation of possible TAVR.     Patient presents today with his wife.  He reports that he has been having symptoms of dyspnea with exertion along with lower extremity edema.  However he is not sure if his dyspnea with exertion is related to his valve or related to his sinus problems that he has been having.  He denies having chest pain.      There are no interval changes in history of present illness. There has been no changes to medications, and no interval admissions to the hospital since last clinical encounter.            PAST HISTORY:   Past Medical History:   Diagnosis Date    Arthritis     Atrial fibrillation (HCC)     Alonzo's esophagus     BPH (benign prostatic hypertrophy)     Diverticulosis     Gastritis     GERD (gastroesophageal reflux disease)     H/O non-insulin dependent diabetes mellitus     Hemorrhoids, internal     Hernia, hiatal     HTN (hypertension)     Hyperlipidemia     Hypertension     Mobitz type 1 second degree atrioventricular block     MRSA infection     Wife states approx 2023

## 2025-06-25 ENCOUNTER — HOSPITAL ENCOUNTER (INPATIENT)
Age: 80
LOS: 1 days | Discharge: HOME | DRG: 267 | End: 2025-06-26
Attending: INTERNAL MEDICINE | Admitting: INTERNAL MEDICINE
Payer: MEDICARE

## 2025-06-25 ENCOUNTER — APPOINTMENT (OUTPATIENT)
Age: 80
DRG: 267 | End: 2025-06-25
Attending: INTERNAL MEDICINE
Payer: MEDICARE

## 2025-06-25 ENCOUNTER — ANESTHESIA (OUTPATIENT)
Dept: OPERATING ROOM | Age: 80
DRG: 267 | End: 2025-06-25
Payer: MEDICARE

## 2025-06-25 DIAGNOSIS — I35.0 NONRHEUMATIC AORTIC VALVE STENOSIS: ICD-10-CM

## 2025-06-25 DIAGNOSIS — Z95.2 HISTORY OF TRANSCATHETER AORTIC VALVE REPLACEMENT (TAVR): ICD-10-CM

## 2025-06-25 DIAGNOSIS — I35.0 NODULAR CALCIFIC AORTIC VALVE STENOSIS: ICD-10-CM

## 2025-06-25 DIAGNOSIS — Z01.810 PRE-OPERATIVE CARDIOVASCULAR EXAMINATION: Primary | ICD-10-CM

## 2025-06-25 LAB
ABO/RH: NORMAL
ANION GAP SERPL CALCULATED.3IONS-SCNC: 11 MMOL/L (ref 7–16)
ANTIBODY SCREEN: NEGATIVE
ARM BAND NUMBER: NORMAL
BLOOD BANK DISPENSE STATUS: NORMAL
BLOOD BANK SAMPLE EXPIRATION: NORMAL
BPU ID: NORMAL
BUN SERPL-MCNC: 17 MG/DL (ref 8–23)
CALCIUM SERPL-MCNC: 9 MG/DL (ref 8.8–10.2)
CHLORIDE SERPL-SCNC: 103 MMOL/L (ref 98–107)
CO2 SERPL-SCNC: 23 MMOL/L (ref 22–29)
COMPONENT: NORMAL
CREAT SERPL-MCNC: 1 MG/DL (ref 0.7–1.2)
CROSSMATCH RESULT: NORMAL
ECHO AV AREA PEAK VELOCITY: 3.5 CM2
ECHO AV AREA VTI: 3.7 CM2
ECHO AV AREA/BSA PEAK VELOCITY: 1.5 CM2/M2
ECHO AV AREA/BSA VTI: 1.6 CM2/M2
ECHO AV MEAN GRADIENT: 3 MMHG
ECHO AV MEAN VELOCITY: 0.8 M/S
ECHO AV PEAK GRADIENT: 6 MMHG
ECHO AV PEAK VELOCITY: 1.2 M/S
ECHO AV VELOCITY RATIO: 0.75
ECHO AV VTI: 30.1 CM
ECHO BSA: 2.32 M2
ECHO EST RA PRESSURE: 3 MMHG
ECHO LV EF PHYSICIAN: 62 %
ECHO LVOT AREA: 4.5 CM2
ECHO LVOT AV VTI INDEX: 0.79
ECHO LVOT DIAM: 2.4 CM
ECHO LVOT MEAN GRADIENT: 2 MMHG
ECHO LVOT PEAK GRADIENT: 4 MMHG
ECHO LVOT PEAK VELOCITY: 0.9 M/S
ECHO LVOT STROKE VOLUME INDEX: 47 ML/M2
ECHO LVOT SV: 107.2 ML
ECHO LVOT VTI: 23.7 CM
ERYTHROCYTE [DISTWIDTH] IN BLOOD BY AUTOMATED COUNT: 13.2 % (ref 11.5–15)
GFR, ESTIMATED: 76 ML/MIN/1.73M2
GLUCOSE BLD-MCNC: 133 MG/DL (ref 74–99)
GLUCOSE BLD-MCNC: 198 MG/DL (ref 74–99)
GLUCOSE BLD-MCNC: 263 MG/DL (ref 74–99)
GLUCOSE BLD-MCNC: 276 MG/DL (ref 74–99)
GLUCOSE SERPL-MCNC: 205 MG/DL (ref 74–99)
HCT VFR BLD AUTO: 33.9 % (ref 37–54)
HGB BLD-MCNC: 11.8 G/DL (ref 12.5–16.5)
INR PPP: 1.2
MAGNESIUM SERPL-MCNC: 1.7 MG/DL (ref 1.6–2.4)
MCH RBC QN AUTO: 33.2 PG (ref 26–35)
MCHC RBC AUTO-ENTMCNC: 34.8 G/DL (ref 32–34.5)
MCV RBC AUTO: 95.5 FL (ref 80–99.9)
PLATELET # BLD AUTO: 130 K/UL (ref 130–450)
PMV BLD AUTO: 10.4 FL (ref 7–12)
POTASSIUM SERPL-SCNC: 4.6 MMOL/L (ref 3.5–5.1)
PROTHROMBIN TIME: 13.5 SEC (ref 9.3–12.4)
RBC # BLD AUTO: 3.55 M/UL (ref 3.8–5.8)
SODIUM SERPL-SCNC: 137 MMOL/L (ref 136–145)
TRANSFUSION STATUS: NORMAL
UNIT DIVISION: 0
WBC OTHER # BLD: 6.2 K/UL (ref 4.5–11.5)

## 2025-06-25 PROCEDURE — 93325 DOPPLER ECHO COLOR FLOW MAPG: CPT | Performed by: INTERNAL MEDICINE

## 2025-06-25 PROCEDURE — 93005 ELECTROCARDIOGRAM TRACING: CPT | Performed by: PHYSICIAN ASSISTANT

## 2025-06-25 PROCEDURE — 93005 ELECTROCARDIOGRAM TRACING: CPT | Performed by: INTERNAL MEDICINE

## 2025-06-25 PROCEDURE — 2500000003 HC RX 250 WO HCPCS: Performed by: PHYSICIAN ASSISTANT

## 2025-06-25 PROCEDURE — 2580000003 HC RX 258

## 2025-06-25 PROCEDURE — 2500000003 HC RX 250 WO HCPCS: Performed by: INTERNAL MEDICINE

## 2025-06-25 PROCEDURE — 3600000017 HC SURGERY HYBRID ADDL 15MIN: Performed by: INTERNAL MEDICINE

## 2025-06-25 PROCEDURE — 7100000001 HC PACU RECOVERY - ADDTL 15 MIN

## 2025-06-25 PROCEDURE — 6360000002 HC RX W HCPCS: Performed by: PHYSICIAN ASSISTANT

## 2025-06-25 PROCEDURE — 93321 DOPPLER ECHO F-UP/LMTD STD: CPT | Performed by: INTERNAL MEDICINE

## 2025-06-25 PROCEDURE — C1889 IMPLANT/INSERT DEVICE, NOC: HCPCS | Performed by: INTERNAL MEDICINE

## 2025-06-25 PROCEDURE — 2580000003 HC RX 258: Performed by: PHYSICIAN ASSISTANT

## 2025-06-25 PROCEDURE — 6360000004 HC RX CONTRAST MEDICATION: Performed by: INTERNAL MEDICINE

## 2025-06-25 PROCEDURE — 3700000001 HC ADD 15 MINUTES (ANESTHESIA): Performed by: INTERNAL MEDICINE

## 2025-06-25 PROCEDURE — 6370000000 HC RX 637 (ALT 250 FOR IP): Performed by: PHYSICIAN ASSISTANT

## 2025-06-25 PROCEDURE — 83735 ASSAY OF MAGNESIUM: CPT

## 2025-06-25 PROCEDURE — 85347 COAGULATION TIME ACTIVATED: CPT

## 2025-06-25 PROCEDURE — C1769 GUIDE WIRE: HCPCS | Performed by: INTERNAL MEDICINE

## 2025-06-25 PROCEDURE — 3600000007 HC SURGERY HYBRID BASE: Performed by: INTERNAL MEDICINE

## 2025-06-25 PROCEDURE — 2709999900 HC NON-CHARGEABLE SUPPLY: Performed by: INTERNAL MEDICINE

## 2025-06-25 PROCEDURE — C1725 CATH, TRANSLUMIN NON-LASER: HCPCS | Performed by: INTERNAL MEDICINE

## 2025-06-25 PROCEDURE — 02RF38Z REPLACEMENT OF AORTIC VALVE WITH ZOOPLASTIC TISSUE, PERCUTANEOUS APPROACH: ICD-10-PCS | Performed by: INTERNAL MEDICINE

## 2025-06-25 PROCEDURE — 82962 GLUCOSE BLOOD TEST: CPT

## 2025-06-25 PROCEDURE — 85027 COMPLETE CBC AUTOMATED: CPT

## 2025-06-25 PROCEDURE — 93321 DOPPLER ECHO F-UP/LMTD STD: CPT

## 2025-06-25 PROCEDURE — 6360000002 HC RX W HCPCS

## 2025-06-25 PROCEDURE — 2500000003 HC RX 250 WO HCPCS

## 2025-06-25 PROCEDURE — 7100000000 HC PACU RECOVERY - FIRST 15 MIN

## 2025-06-25 PROCEDURE — 3700000000 HC ANESTHESIA ATTENDED CARE: Performed by: INTERNAL MEDICINE

## 2025-06-25 PROCEDURE — 85610 PROTHROMBIN TIME: CPT

## 2025-06-25 PROCEDURE — C1760 CLOSURE DEV, VASC: HCPCS | Performed by: INTERNAL MEDICINE

## 2025-06-25 PROCEDURE — 6360000002 HC RX W HCPCS: Performed by: INTERNAL MEDICINE

## 2025-06-25 PROCEDURE — 93308 TTE F-UP OR LMTD: CPT | Performed by: INTERNAL MEDICINE

## 2025-06-25 PROCEDURE — 37799 UNLISTED PX VASCULAR SURGERY: CPT

## 2025-06-25 PROCEDURE — C1894 INTRO/SHEATH, NON-LASER: HCPCS | Performed by: INTERNAL MEDICINE

## 2025-06-25 PROCEDURE — 33361 REPLACE AORTIC VALVE PERQ: CPT | Performed by: THORACIC SURGERY (CARDIOTHORACIC VASCULAR SURGERY)

## 2025-06-25 PROCEDURE — 2140000000 HC CCU INTERMEDIATE R&B

## 2025-06-25 PROCEDURE — 2720000010 HC SURG SUPPLY STERILE: Performed by: INTERNAL MEDICINE

## 2025-06-25 PROCEDURE — 80048 BASIC METABOLIC PNL TOTAL CA: CPT

## 2025-06-25 DEVICE — TAV EVFXPLUS-34 COMM US
Type: IMPLANTABLE DEVICE | Site: AORTIC VALVE | Status: FUNCTIONAL
Brand: EVOLUT™ FX+

## 2025-06-25 RX ORDER — SODIUM CHLORIDE 9 MG/ML
INJECTION, SOLUTION INTRAVENOUS PRN
Status: DISCONTINUED | OUTPATIENT
Start: 2025-06-25 | End: 2025-06-26 | Stop reason: HOSPADM

## 2025-06-25 RX ORDER — SODIUM CHLORIDE 9 MG/ML
INJECTION, SOLUTION INTRAVENOUS CONTINUOUS
Status: DISCONTINUED | OUTPATIENT
Start: 2025-06-25 | End: 2025-06-25 | Stop reason: HOSPADM

## 2025-06-25 RX ORDER — AMLODIPINE BESYLATE 5 MG/1
5 TABLET ORAL EVERY MORNING
Status: DISCONTINUED | OUTPATIENT
Start: 2025-06-26 | End: 2025-06-26 | Stop reason: HOSPADM

## 2025-06-25 RX ORDER — SODIUM CHLORIDE 9 MG/ML
INJECTION, SOLUTION INTRAVENOUS PRN
Status: DISCONTINUED | OUTPATIENT
Start: 2025-06-25 | End: 2025-06-25 | Stop reason: SDUPTHER

## 2025-06-25 RX ORDER — SODIUM CHLORIDE 9 MG/ML
INJECTION, SOLUTION INTRAVENOUS PRN
Status: DISCONTINUED | OUTPATIENT
Start: 2025-06-25 | End: 2025-06-25 | Stop reason: HOSPADM

## 2025-06-25 RX ORDER — SODIUM CHLORIDE 0.9 % (FLUSH) 0.9 %
5-40 SYRINGE (ML) INJECTION EVERY 12 HOURS SCHEDULED
Status: DISCONTINUED | OUTPATIENT
Start: 2025-06-25 | End: 2025-06-25

## 2025-06-25 RX ORDER — PROCHLORPERAZINE MALEATE 10 MG
10 TABLET ORAL EVERY 8 HOURS PRN
Status: DISCONTINUED | OUTPATIENT
Start: 2025-06-25 | End: 2025-06-26 | Stop reason: HOSPADM

## 2025-06-25 RX ORDER — SODIUM CHLORIDE 0.9 % (FLUSH) 0.9 %
5-40 SYRINGE (ML) INJECTION PRN
Status: DISCONTINUED | OUTPATIENT
Start: 2025-06-25 | End: 2025-06-25

## 2025-06-25 RX ORDER — INSULIN LISPRO 100 [IU]/ML
0-6 INJECTION, SOLUTION INTRAVENOUS; SUBCUTANEOUS NIGHTLY
Status: DISCONTINUED | OUTPATIENT
Start: 2025-06-25 | End: 2025-06-26 | Stop reason: HOSPADM

## 2025-06-25 RX ORDER — ONDANSETRON 2 MG/ML
4 INJECTION INTRAMUSCULAR; INTRAVENOUS EVERY 6 HOURS PRN
Status: DISCONTINUED | OUTPATIENT
Start: 2025-06-25 | End: 2025-06-26 | Stop reason: HOSPADM

## 2025-06-25 RX ORDER — PROCHLORPERAZINE EDISYLATE 5 MG/ML
10 INJECTION INTRAMUSCULAR; INTRAVENOUS EVERY 6 HOURS PRN
Status: DISCONTINUED | OUTPATIENT
Start: 2025-06-25 | End: 2025-06-26 | Stop reason: HOSPADM

## 2025-06-25 RX ORDER — SODIUM CHLORIDE 0.9 % (FLUSH) 0.9 %
5-40 SYRINGE (ML) INJECTION EVERY 12 HOURS SCHEDULED
Status: DISCONTINUED | OUTPATIENT
Start: 2025-06-25 | End: 2025-06-26 | Stop reason: HOSPADM

## 2025-06-25 RX ORDER — TAMSULOSIN HYDROCHLORIDE 0.4 MG/1
0.4 CAPSULE ORAL DAILY
Status: DISCONTINUED | OUTPATIENT
Start: 2025-06-25 | End: 2025-06-26 | Stop reason: HOSPADM

## 2025-06-25 RX ORDER — MAGNESIUM SULFATE IN WATER 40 MG/ML
2000 INJECTION, SOLUTION INTRAVENOUS PRN
Status: DISCONTINUED | OUTPATIENT
Start: 2025-06-25 | End: 2025-06-26 | Stop reason: HOSPADM

## 2025-06-25 RX ORDER — ONDANSETRON 4 MG/1
4 TABLET, ORALLY DISINTEGRATING ORAL EVERY 8 HOURS PRN
Status: DISCONTINUED | OUTPATIENT
Start: 2025-06-25 | End: 2025-06-26 | Stop reason: HOSPADM

## 2025-06-25 RX ORDER — AZELASTINE 1 MG/ML
1 SPRAY, METERED NASAL 2 TIMES DAILY
Status: DISCONTINUED | OUTPATIENT
Start: 2025-06-25 | End: 2025-06-25 | Stop reason: CLARIF

## 2025-06-25 RX ORDER — INSULIN LISPRO 100 [IU]/ML
0-12 INJECTION, SOLUTION INTRAVENOUS; SUBCUTANEOUS
Status: DISCONTINUED | OUTPATIENT
Start: 2025-06-25 | End: 2025-06-26 | Stop reason: HOSPADM

## 2025-06-25 RX ORDER — FINASTERIDE 5 MG/1
5 TABLET, FILM COATED ORAL DAILY
Status: DISCONTINUED | OUTPATIENT
Start: 2025-06-25 | End: 2025-06-26 | Stop reason: HOSPADM

## 2025-06-25 RX ORDER — GLUCAGON 1 MG/ML
1 KIT INJECTION PRN
Status: DISCONTINUED | OUTPATIENT
Start: 2025-06-25 | End: 2025-06-26 | Stop reason: HOSPADM

## 2025-06-25 RX ORDER — ALLOPURINOL 100 MG/1
200 TABLET ORAL DAILY
Status: DISCONTINUED | OUTPATIENT
Start: 2025-06-25 | End: 2025-06-26 | Stop reason: HOSPADM

## 2025-06-25 RX ORDER — MAGNESIUM SULFATE IN WATER 40 MG/ML
2000 INJECTION, SOLUTION INTRAVENOUS ONCE
Status: COMPLETED | OUTPATIENT
Start: 2025-06-26 | End: 2025-06-26

## 2025-06-25 RX ORDER — M-VIT,TX,IRON,MINS/CALC/FOLIC 27MG-0.4MG
1 TABLET ORAL DAILY
Status: DISCONTINUED | OUTPATIENT
Start: 2025-06-25 | End: 2025-06-26 | Stop reason: HOSPADM

## 2025-06-25 RX ORDER — ONDANSETRON 2 MG/ML
4 INJECTION INTRAMUSCULAR; INTRAVENOUS EVERY 6 HOURS PRN
Status: DISCONTINUED | OUTPATIENT
Start: 2025-06-25 | End: 2025-06-25 | Stop reason: CLARIF

## 2025-06-25 RX ORDER — VASOPRESSIN 20 [USP'U]/ML
INJECTION, SOLUTION INTRAVENOUS
Status: DISCONTINUED | OUTPATIENT
Start: 2025-06-25 | End: 2025-06-25 | Stop reason: SDUPTHER

## 2025-06-25 RX ORDER — GLIPIZIDE 5 MG/1
10 TABLET ORAL
Status: DISCONTINUED | OUTPATIENT
Start: 2025-06-26 | End: 2025-06-26 | Stop reason: HOSPADM

## 2025-06-25 RX ORDER — ACETAMINOPHEN 325 MG/1
650 TABLET ORAL EVERY 4 HOURS PRN
Status: DISCONTINUED | OUTPATIENT
Start: 2025-06-25 | End: 2025-06-26 | Stop reason: HOSPADM

## 2025-06-25 RX ORDER — ONDANSETRON 2 MG/ML
INJECTION INTRAMUSCULAR; INTRAVENOUS
Status: DISCONTINUED | OUTPATIENT
Start: 2025-06-25 | End: 2025-06-25 | Stop reason: SDUPTHER

## 2025-06-25 RX ORDER — METOPROLOL SUCCINATE 25 MG/1
25 TABLET, EXTENDED RELEASE ORAL EVERY MORNING
Status: DISCONTINUED | OUTPATIENT
Start: 2025-06-25 | End: 2025-06-26 | Stop reason: HOSPADM

## 2025-06-25 RX ORDER — HYDRALAZINE HYDROCHLORIDE 20 MG/ML
10 INJECTION INTRAMUSCULAR; INTRAVENOUS EVERY 6 HOURS PRN
Status: DISCONTINUED | OUTPATIENT
Start: 2025-06-25 | End: 2025-06-26 | Stop reason: HOSPADM

## 2025-06-25 RX ORDER — MUPIROCIN 2 %
OINTMENT (GRAM) TOPICAL ONCE
Status: COMPLETED | OUTPATIENT
Start: 2025-06-25 | End: 2025-06-25

## 2025-06-25 RX ORDER — SODIUM CHLORIDE 0.9 % (FLUSH) 0.9 %
5-40 SYRINGE (ML) INJECTION PRN
Status: DISCONTINUED | OUTPATIENT
Start: 2025-06-25 | End: 2025-06-26 | Stop reason: HOSPADM

## 2025-06-25 RX ORDER — PHENYLEPHRINE HCL IN 0.9% NACL 1 MG/10 ML
SYRINGE (ML) INTRAVENOUS
Status: DISCONTINUED | OUTPATIENT
Start: 2025-06-25 | End: 2025-06-25 | Stop reason: SDUPTHER

## 2025-06-25 RX ORDER — PANTOPRAZOLE SODIUM 40 MG/1
40 TABLET, DELAYED RELEASE ORAL
Status: DISCONTINUED | OUTPATIENT
Start: 2025-06-26 | End: 2025-06-26 | Stop reason: HOSPADM

## 2025-06-25 RX ORDER — PROTAMINE SULFATE 10 MG/ML
INJECTION, SOLUTION INTRAVENOUS
Status: DISCONTINUED | OUTPATIENT
Start: 2025-06-25 | End: 2025-06-25 | Stop reason: SDUPTHER

## 2025-06-25 RX ORDER — IOPAMIDOL 755 MG/ML
INJECTION, SOLUTION INTRAVASCULAR PRN
Status: DISCONTINUED | OUTPATIENT
Start: 2025-06-25 | End: 2025-06-25 | Stop reason: HOSPADM

## 2025-06-25 RX ORDER — PROPOFOL 10 MG/ML
INJECTION, EMULSION INTRAVENOUS
Status: DISCONTINUED | OUTPATIENT
Start: 2025-06-25 | End: 2025-06-25 | Stop reason: SDUPTHER

## 2025-06-25 RX ORDER — DEXTROSE MONOHYDRATE 100 MG/ML
INJECTION, SOLUTION INTRAVENOUS CONTINUOUS PRN
Status: DISCONTINUED | OUTPATIENT
Start: 2025-06-25 | End: 2025-06-26 | Stop reason: HOSPADM

## 2025-06-25 RX ORDER — FLUTICASONE PROPIONATE 50 MCG
2 SPRAY, SUSPENSION (ML) NASAL DAILY PRN
Status: DISCONTINUED | OUTPATIENT
Start: 2025-06-25 | End: 2025-06-26 | Stop reason: HOSPADM

## 2025-06-25 RX ORDER — OXYCODONE HYDROCHLORIDE 5 MG/1
10 TABLET ORAL EVERY 4 HOURS PRN
Status: DISCONTINUED | OUTPATIENT
Start: 2025-06-25 | End: 2025-06-26 | Stop reason: HOSPADM

## 2025-06-25 RX ORDER — OXYCODONE HYDROCHLORIDE 5 MG/1
5 TABLET ORAL EVERY 4 HOURS PRN
Status: DISCONTINUED | OUTPATIENT
Start: 2025-06-25 | End: 2025-06-26 | Stop reason: HOSPADM

## 2025-06-25 RX ORDER — SODIUM CHLORIDE 0.9 % (FLUSH) 0.9 %
5-40 SYRINGE (ML) INJECTION PRN
Status: DISCONTINUED | OUTPATIENT
Start: 2025-06-25 | End: 2025-06-25 | Stop reason: HOSPADM

## 2025-06-25 RX ORDER — LIDOCAINE HYDROCHLORIDE 10 MG/ML
INJECTION, SOLUTION EPIDURAL; INFILTRATION; INTRACAUDAL; PERINEURAL PRN
Status: DISCONTINUED | OUTPATIENT
Start: 2025-06-25 | End: 2025-06-25 | Stop reason: ALTCHOICE

## 2025-06-25 RX ORDER — ONDANSETRON 4 MG/1
4 TABLET, ORALLY DISINTEGRATING ORAL EVERY 8 HOURS PRN
Status: DISCONTINUED | OUTPATIENT
Start: 2025-06-25 | End: 2025-06-25 | Stop reason: CLARIF

## 2025-06-25 RX ORDER — SENNOSIDES 8.6 MG
325 CAPSULE ORAL ONCE
Status: COMPLETED | OUTPATIENT
Start: 2025-06-25 | End: 2025-06-25

## 2025-06-25 RX ORDER — POTASSIUM CHLORIDE 1500 MG/1
40 TABLET, EXTENDED RELEASE ORAL PRN
Status: DISCONTINUED | OUTPATIENT
Start: 2025-06-25 | End: 2025-06-26 | Stop reason: HOSPADM

## 2025-06-25 RX ORDER — SODIUM CHLORIDE 0.9 % (FLUSH) 0.9 %
5-40 SYRINGE (ML) INJECTION EVERY 12 HOURS SCHEDULED
Status: DISCONTINUED | OUTPATIENT
Start: 2025-06-25 | End: 2025-06-25 | Stop reason: HOSPADM

## 2025-06-25 RX ORDER — DEXAMETHASONE SODIUM PHOSPHATE 10 MG/ML
INJECTION, SOLUTION INTRA-ARTICULAR; INTRALESIONAL; INTRAMUSCULAR; INTRAVENOUS; SOFT TISSUE
Status: DISCONTINUED | OUTPATIENT
Start: 2025-06-25 | End: 2025-06-25 | Stop reason: SDUPTHER

## 2025-06-25 RX ORDER — POTASSIUM CHLORIDE 7.45 MG/ML
10 INJECTION INTRAVENOUS PRN
Status: DISCONTINUED | OUTPATIENT
Start: 2025-06-25 | End: 2025-06-26 | Stop reason: HOSPADM

## 2025-06-25 RX ADMIN — METOPROLOL SUCCINATE 25 MG: 25 TABLET, EXTENDED RELEASE ORAL at 11:44

## 2025-06-25 RX ADMIN — PROPOFOL 50 MCG/KG/MIN: 10 INJECTION, EMULSION INTRAVENOUS at 07:35

## 2025-06-25 RX ADMIN — INSULIN LISPRO 2 UNITS: 100 INJECTION, SOLUTION INTRAVENOUS; SUBCUTANEOUS at 12:15

## 2025-06-25 RX ADMIN — Medication 100 MCG: at 08:20

## 2025-06-25 RX ADMIN — SODIUM CHLORIDE: 0.9 INJECTION, SOLUTION INTRAVENOUS at 06:04

## 2025-06-25 RX ADMIN — Medication 100 MCG: at 08:17

## 2025-06-25 RX ADMIN — SODIUM CHLORIDE, PRESERVATIVE FREE 10 ML: 5 INJECTION INTRAVENOUS at 22:52

## 2025-06-25 RX ADMIN — MUPIROCIN: 20 OINTMENT TOPICAL at 06:13

## 2025-06-25 RX ADMIN — ASPIRIN 325 MG: 325 TABLET, COATED ORAL at 06:11

## 2025-06-25 RX ADMIN — SODIUM CHLORIDE 0.5 MCG/KG/HR: 9 INJECTION, SOLUTION INTRAVENOUS at 07:35

## 2025-06-25 RX ADMIN — Medication 100 MCG: at 08:14

## 2025-06-25 RX ADMIN — PROTAMINE SULFATE 50 MG: 10 INJECTION, SOLUTION INTRAVENOUS at 08:46

## 2025-06-25 RX ADMIN — ALLOPURINOL 200 MG: 100 TABLET ORAL at 15:22

## 2025-06-25 RX ADMIN — TAMSULOSIN HYDROCHLORIDE 0.4 MG: 0.4 CAPSULE ORAL at 11:44

## 2025-06-25 RX ADMIN — Medication 1 TABLET: at 15:58

## 2025-06-25 RX ADMIN — INSULIN LISPRO 6 UNITS: 100 INJECTION, SOLUTION INTRAVENOUS; SUBCUTANEOUS at 16:03

## 2025-06-25 RX ADMIN — FINASTERIDE 5 MG: 5 TABLET, FILM COATED ORAL at 11:00

## 2025-06-25 RX ADMIN — VASOPRESSIN 0.5 UNITS: 20 INJECTION INTRAVENOUS at 08:29

## 2025-06-25 RX ADMIN — ONDANSETRON HYDROCHLORIDE 4 MG: 2 SOLUTION INTRAMUSCULAR; INTRAVENOUS at 07:19

## 2025-06-25 RX ADMIN — DEXAMETHASONE SODIUM PHOSPHATE 10 MG: 10 INJECTION INTRAMUSCULAR; INTRAVENOUS at 07:19

## 2025-06-25 RX ADMIN — HYDRALAZINE HYDROCHLORIDE 10 MG: 20 INJECTION INTRAMUSCULAR; INTRAVENOUS at 15:20

## 2025-06-25 RX ADMIN — CEFAZOLIN 2000 MG: 1 INJECTION, POWDER, FOR SOLUTION INTRAMUSCULAR; INTRAVENOUS at 07:46

## 2025-06-25 RX ADMIN — SODIUM CHLORIDE, PRESERVATIVE FREE 10 ML: 5 INJECTION INTRAVENOUS at 15:21

## 2025-06-25 RX ADMIN — Medication 100 MCG: at 08:29

## 2025-06-25 RX ADMIN — Medication 100 MCG: at 08:08

## 2025-06-25 RX ADMIN — Medication 20 MG: at 08:04

## 2025-06-25 RX ADMIN — INSULIN LISPRO 3 UNITS: 100 INJECTION, SOLUTION INTRAVENOUS; SUBCUTANEOUS at 22:52

## 2025-06-25 RX ADMIN — WATER 2000 MG: 1 INJECTION INTRAMUSCULAR; INTRAVENOUS; SUBCUTANEOUS at 15:58

## 2025-06-25 ASSESSMENT — PAIN SCALES - GENERAL: PAINLEVEL_OUTOF10: 0

## 2025-06-25 ASSESSMENT — PAIN - FUNCTIONAL ASSESSMENT: PAIN_FUNCTIONAL_ASSESSMENT: 0-10

## 2025-06-25 NOTE — OP NOTE
TRANSCATHETER AORTIC VALVE REPLACEMENT  Operative Note      Date of procedure:   6/25/2026    Interventional Cardiologist: Ismael Mast MD  Cardiothoracic Surgeon: Bhupendra Phillips DO    Pre-operative diagnosis: Severe symptomatic aortic stenosis.   Post-operative diagnosis: Successful transcatheter aortic valve replacement (TAVR) using a 34-mm Evolut PRO+ valve.     TAVR access: right common femoral artery percutaneous approach  Pigtail access: left common femoral artery  Appropriate arterial and venous lines were additionally placed by anesthesia as needed.    TAVR access closure: Prostyle Perclose   Pigtail access closure: manual pressure    Procedure:   Arterial access in the TAVR and Pigtail access sites as above  Limited peripheral angiography   Temporary transvenous pacer insertion via left femoral vein   Aortic root angiography  Transfemoral TAVR using a 34-mm Evolut PRO+ valve      Anesthesia: deep sedation/MAC    Indication for procedure:   Severe symptomatic aortic stenosis    “Severe aortic stenosis Shared Decision Making discussion took place using the [employer] AS: TAVR/SAVR tool. The treatment plan formulated by the Heart Team and the patient & the patient’s preference for AVR is TAVR”    Description of the Procedure:   Following informed consent, the patient was bought to the hybrid OR and placed under anesthesia as above. Transthoracic echo was used to aid in guidance of the procedure.      Using ultrasound guidance and angiographic confirmation, a a micropuncture needle  was used to access the artery and a placeholder sheath was placed; ; this access will be used to deliver the trascatheter heart valve eventually. Using ultrasound guidance, a micropuncture needle was used to access the  and appropriate site was confirmed using angiography and a 5F sheath was placed; this access will be used for the Pigtail catheter subsequently. The  vein was accessed and a 6F sheath

## 2025-06-25 NOTE — PLAN OF CARE
Problem: Chronic Conditions and Co-morbidities  Goal: Patient's chronic conditions and co-morbidity symptoms are monitored and maintained or improved  6/25/2025 1824 by Jyoti Alva RN  Outcome: Progressing  Flowsheets (Taken 6/25/2025 1811)  Care Plan - Patient's Chronic Conditions and Co-Morbidity Symptoms are Monitored and Maintained or Improved: Monitor and assess patient's chronic conditions and comorbid symptoms for stability, deterioration, or improvement  6/25/2025 1634 by Brittni Santana RN  Outcome: Progressing  Flowsheets (Taken 6/25/2025 0930)  Care Plan - Patient's Chronic Conditions and Co-Morbidity Symptoms are Monitored and Maintained or Improved: Monitor and assess patient's chronic conditions and comorbid symptoms for stability, deterioration, or improvement     Problem: Discharge Planning  Goal: Discharge to home or other facility with appropriate resources  6/25/2025 1824 by Jyoti Alva RN  Outcome: Progressing  Flowsheets (Taken 6/25/2025 1811)  Discharge to home or other facility with appropriate resources: Identify barriers to discharge with patient and caregiver  6/25/2025 1634 by Brittni Santana RN  Outcome: Progressing     Problem: Pain  Goal: Verbalizes/displays adequate comfort level or baseline comfort level  6/25/2025 1824 by Jyoti Alva RN  Outcome: Progressing  6/25/2025 1634 by Brittni Santana RN  Outcome: Progressing     Problem: Skin/Tissue Integrity  Goal: Skin integrity remains intact  Description: 1.  Monitor for areas of redness and/or skin breakdown  2.  Assess vascular access sites hourly  3.  Every 4-6 hours minimum:  Change oxygen saturation probe site  4.  Every 4-6 hours:  If on nasal continuous positive airway pressure, respiratory therapy assess nares and determine need for appliance change or resting period  6/25/2025 1824 by Jyoti Alva RN  Outcome: Progressing  Flowsheets  Taken 6/25/2025 1824  Skin Integrity Remains Intact: Monitor

## 2025-06-25 NOTE — ANESTHESIA PRE PROCEDURE
Department of Anesthesiology  Preprocedure Note       Name:  Manny Ceballos   Age:  79 y.o.  :  1945                                          MRN:  [id number]         Date:  2025      Surgeon: Surgeon(s):  Ismael Mast MD Jubach, Jeremy, DO Jubach, Jeremy, DO    Procedure: Procedure(s):  TRANSCATHETER AORTIC VALVE REPLACEMENT FEMORAL APPROACHt  TRANSCATHETER AORTIC VALVE REPLACEMENT FEMORAL APPROACH    Medications prior to admission:   Prior to Admission medications    Medication Sig Start Date End Date Taking? Authorizing Provider   azelastine (ASTELIN) 0.1 % nasal spray 1 spray by Nasal route 2 times daily Use in each nostril as directed   Yes Provider, MD Lakhwinder   amLODIPine (NORVASC) 5 MG tablet Take 1 tablet by mouth daily  Patient taking differently: Take 1 tablet by mouth every morning 25  Yes Ana Urbina MD   metoprolol succinate (TOPROL XL) 25 MG extended release tablet Take 1 tablet by mouth daily  Patient taking differently: Take 1 tablet by mouth every morning 25  Yes Joyce Sierra, APRN - CNP   metFORMIN (GLUCOPHAGE) 1000 MG tablet TAKE 1 TABLET TWICE A DAY 24  Yes Rustam Pastrana DO   fluticasone (FLONASE) 50 MCG/ACT nasal spray 2 sprays by Each Nostril route daily 10/28/24  Yes Rustam Pastrana DO   tamsulosin (FLOMAX) 0.4 MG capsule Take 1 capsule by mouth daily 10/28/24  Yes Rustam Pastrana DO   allopurinol (ZYLOPRIM) 100 MG tablet Take 2 tablets by mouth daily 10/28/24  Yes Rustam Pastrana DO   finasteride (PROSCAR) 5 MG tablet Take 1 tablet by mouth daily 10/28/24  Yes Rustam Pastrana DO   glimepiride (AMARYL) 4 MG tablet Take 1 tablet by mouth every morning (before breakfast) 10/28/24  Yes Rustam Pastrana DO   omeprazole (PRILOSEC) 20 MG delayed release capsule Take 1 capsule by mouth daily  Patient taking differently: Take 1 capsule by mouth every morning 10/28/24  Yes Rustam Pastrana, DO   Multiple Vitamins-Minerals (CENTRUM ULTRA

## 2025-06-25 NOTE — PLAN OF CARE
Problem: Chronic Conditions and Co-morbidities  Goal: Patient's chronic conditions and co-morbidity symptoms are monitored and maintained or improved  Outcome: Progressing  Flowsheets (Taken 6/25/2025 9849)  Care Plan - Patient's Chronic Conditions and Co-Morbidity Symptoms are Monitored and Maintained or Improved: Monitor and assess patient's chronic conditions and comorbid symptoms for stability, deterioration, or improvement     Problem: Discharge Planning  Goal: Discharge to home or other facility with appropriate resources  Outcome: Progressing     Problem: Pain  Goal: Verbalizes/displays adequate comfort level or baseline comfort level  Outcome: Progressing     Problem: Skin/Tissue Integrity  Goal: Skin integrity remains intact  Description: 1.  Monitor for areas of redness and/or skin breakdown  2.  Assess vascular access sites hourly  3.  Every 4-6 hours minimum:  Change oxygen saturation probe site  4.  Every 4-6 hours:  If on nasal continuous positive airway pressure, respiratory therapy assess nares and determine need for appliance change or resting period  Outcome: Progressing     Problem: Safety - Adult  Goal: Free from fall injury  Outcome: Progressing

## 2025-06-25 NOTE — ANESTHESIA POSTPROCEDURE EVALUATION
Department of Anesthesiology  Postprocedure Note    Patient: Manny Ceballos  MRN: [id number]  YOB: 1945  Date of evaluation: 6/25/2025    Procedure Summary       Date: 06/25/25 Room / Location: [address]    Anesthesia Start: 0714 Anesthesia Stop: 0921    Procedures:       TRANSCATHETER AORTIC VALVE REPLACEMENT FEMORAL APPROACHt      TRANSCATHETER AORTIC VALVE REPLACEMENT FEMORAL APPROACH Diagnosis:       Nodular calcific aortic valve stenosis      (AS)    Surgeons: Ismael Mast MD; Bhupendra Phillips DO Responsible Provider: Daniel Mitchell DO    Anesthesia Type: MAC ASA Status: 4            Anesthesia Type: No value filed.    Roya Phase I: Roya Score: 10    Roya Phase II:      Anesthesia Post Evaluation    Patient location during evaluation: bedside  Patient participation: complete - patient cannot participate  Level of consciousness: awake and alert  Airway patency: patent  Nausea & Vomiting: no nausea and no vomiting  Cardiovascular status: blood pressure returned to baseline  Respiratory status: acceptable  Hydration status: euvolemic  Multimodal analgesia pain management approach    There were no known notable events for this encounter.

## 2025-06-26 ENCOUNTER — APPOINTMENT (OUTPATIENT)
Age: 80
DRG: 267 | End: 2025-06-26
Attending: INTERNAL MEDICINE
Payer: MEDICARE

## 2025-06-26 VITALS
OXYGEN SATURATION: 97 % | HEIGHT: 72 IN | BODY MASS INDEX: 31.05 KG/M2 | WEIGHT: 229.28 LBS | TEMPERATURE: 98 F | HEART RATE: 64 BPM | RESPIRATION RATE: 14 BRPM | SYSTOLIC BLOOD PRESSURE: 150 MMHG | DIASTOLIC BLOOD PRESSURE: 78 MMHG

## 2025-06-26 LAB
ACTIVATED CLOTTING TIME, LOW RANGE: 151 SEC
ACTIVATED CLOTTING TIME, LOW RANGE: 160 SEC
ACTIVATED CLOTTING TIME, LOW RANGE: 329 SEC
ACTIVATED CLOTTING TIME, LOW RANGE: 331 SEC
ANION GAP SERPL CALCULATED.3IONS-SCNC: 12 MMOL/L (ref 7–16)
BUN SERPL-MCNC: 23 MG/DL (ref 8–23)
CALCIUM SERPL-MCNC: 9.1 MG/DL (ref 8.8–10.2)
CHLORIDE SERPL-SCNC: 103 MMOL/L (ref 98–107)
CO2 SERPL-SCNC: 22 MMOL/L (ref 22–29)
CREAT SERPL-MCNC: 1.1 MG/DL (ref 0.7–1.2)
ECHO AV AREA PEAK VELOCITY: 2.8 CM2
ECHO AV AREA VTI: 3.5 CM2
ECHO AV AREA/BSA PEAK VELOCITY: 1.2 CM2/M2
ECHO AV AREA/BSA VTI: 1.5 CM2/M2
ECHO AV CUSP MM: 1.2 CM
ECHO AV MEAN GRADIENT: 4 MMHG
ECHO AV MEAN VELOCITY: 0.9 M/S
ECHO AV PEAK GRADIENT: 8 MMHG
ECHO AV PEAK VELOCITY: 1.4 M/S
ECHO AV VELOCITY RATIO: 0.71
ECHO AV VTI: 26.4 CM
ECHO BSA: 2.3 M2
ECHO BSA: 2.32 M2
ECHO EST RA PRESSURE: 8 MMHG
ECHO LA DIAMETER INDEX: 1.81 CM/M2
ECHO LA DIAMETER: 4.1 CM
ECHO LV EF PHYSICIAN: 65 %
ECHO LV FRACTIONAL SHORTENING: 27 % (ref 28–44)
ECHO LV INTERNAL DIMENSION DIASTOLE INDEX: 1.99 CM/M2
ECHO LV INTERNAL DIMENSION DIASTOLIC: 4.5 CM (ref 4.2–5.9)
ECHO LV INTERNAL DIMENSION SYSTOLIC INDEX: 1.46 CM/M2
ECHO LV INTERNAL DIMENSION SYSTOLIC: 3.3 CM
ECHO LV IVSD: 1.5 CM (ref 0.6–1)
ECHO LV IVSS: 1.7 CM
ECHO LV MASS 2D: 275.8 G (ref 88–224)
ECHO LV MASS INDEX 2D: 122 G/M2 (ref 49–115)
ECHO LV POSTERIOR WALL DIASTOLIC: 1.5 CM (ref 0.6–1)
ECHO LV POSTERIOR WALL SYSTOLIC: 1.9 CM
ECHO LV RELATIVE WALL THICKNESS RATIO: 0.67
ECHO LVOT AREA: 3.8 CM2
ECHO LVOT AV VTI INDEX: 0.9
ECHO LVOT DIAM: 2.2 CM
ECHO LVOT MEAN GRADIENT: 2 MMHG
ECHO LVOT PEAK GRADIENT: 4 MMHG
ECHO LVOT PEAK VELOCITY: 1 M/S
ECHO LVOT STROKE VOLUME INDEX: 40 ML/M2
ECHO LVOT SV: 90.4 ML
ECHO LVOT VTI: 23.8 CM
ECHO RIGHT VENTRICULAR SYSTOLIC PRESSURE (RVSP): 42 MMHG
ECHO RV INTERNAL DIMENSION: 3.8 CM
ECHO TV REGURGITANT MAX VELOCITY: 2.9 M/S
ECHO TV REGURGITANT PEAK GRADIENT: 34 MMHG
EKG ATRIAL RATE: 61 BPM
EKG ATRIAL RATE: 63 BPM
EKG ATRIAL RATE: 70 BPM
EKG P AXIS: 17 DEGREES
EKG P AXIS: 88 DEGREES
EKG P-R INTERVAL: 186 MS
EKG Q-T INTERVAL: 442 MS
EKG Q-T INTERVAL: 472 MS
EKG Q-T INTERVAL: 516 MS
EKG QRS DURATION: 162 MS
EKG QRS DURATION: 168 MS
EKG QRS DURATION: 172 MS
EKG QTC CALCULATION (BAZETT): 477 MS
EKG QTC CALCULATION (BAZETT): 483 MS
EKG QTC CALCULATION (BAZETT): 528 MS
EKG R AXIS: 26 DEGREES
EKG R AXIS: 30 DEGREES
EKG R AXIS: 30 DEGREES
EKG T AXIS: 190 DEGREES
EKG T AXIS: 190 DEGREES
EKG T AXIS: 193 DEGREES
EKG VENTRICULAR RATE: 63 BPM
EKG VENTRICULAR RATE: 63 BPM
EKG VENTRICULAR RATE: 70 BPM
ERYTHROCYTE [DISTWIDTH] IN BLOOD BY AUTOMATED COUNT: 13.1 % (ref 11.5–15)
GFR, ESTIMATED: 68 ML/MIN/1.73M2
GLUCOSE BLD-MCNC: 118 MG/DL (ref 74–99)
GLUCOSE BLD-MCNC: 185 MG/DL (ref 74–99)
GLUCOSE SERPL-MCNC: 179 MG/DL (ref 74–99)
HCT VFR BLD AUTO: 34.9 % (ref 37–54)
HGB BLD-MCNC: 12 G/DL (ref 12.5–16.5)
MAGNESIUM SERPL-MCNC: 2.2 MG/DL (ref 1.6–2.4)
MCH RBC QN AUTO: 33.1 PG (ref 26–35)
MCHC RBC AUTO-ENTMCNC: 34.4 G/DL (ref 32–34.5)
MCV RBC AUTO: 96.4 FL (ref 80–99.9)
PLATELET # BLD AUTO: 145 K/UL (ref 130–450)
PMV BLD AUTO: 11.3 FL (ref 7–12)
POTASSIUM SERPL-SCNC: 4.2 MMOL/L (ref 3.5–5.1)
RBC # BLD AUTO: 3.62 M/UL (ref 3.8–5.8)
SODIUM SERPL-SCNC: 137 MMOL/L (ref 136–145)
WBC OTHER # BLD: 9.1 K/UL (ref 4.5–11.5)

## 2025-06-26 PROCEDURE — 93308 TTE F-UP OR LMTD: CPT

## 2025-06-26 PROCEDURE — 93010 ELECTROCARDIOGRAM REPORT: CPT | Performed by: INTERNAL MEDICINE

## 2025-06-26 PROCEDURE — 80048 BASIC METABOLIC PNL TOTAL CA: CPT

## 2025-06-26 PROCEDURE — 6370000000 HC RX 637 (ALT 250 FOR IP): Performed by: PHYSICIAN ASSISTANT

## 2025-06-26 PROCEDURE — 93321 DOPPLER ECHO F-UP/LMTD STD: CPT | Performed by: INTERNAL MEDICINE

## 2025-06-26 PROCEDURE — 83735 ASSAY OF MAGNESIUM: CPT

## 2025-06-26 PROCEDURE — 6360000002 HC RX W HCPCS: Performed by: INTERNAL MEDICINE

## 2025-06-26 PROCEDURE — 82962 GLUCOSE BLOOD TEST: CPT

## 2025-06-26 PROCEDURE — 93308 TTE F-UP OR LMTD: CPT | Performed by: INTERNAL MEDICINE

## 2025-06-26 PROCEDURE — 36415 COLL VENOUS BLD VENIPUNCTURE: CPT

## 2025-06-26 PROCEDURE — 93005 ELECTROCARDIOGRAM TRACING: CPT | Performed by: PHYSICIAN ASSISTANT

## 2025-06-26 PROCEDURE — 2500000003 HC RX 250 WO HCPCS: Performed by: PHYSICIAN ASSISTANT

## 2025-06-26 PROCEDURE — 93325 DOPPLER ECHO COLOR FLOW MAPG: CPT | Performed by: INTERNAL MEDICINE

## 2025-06-26 PROCEDURE — 6360000002 HC RX W HCPCS: Performed by: PHYSICIAN ASSISTANT

## 2025-06-26 PROCEDURE — 85027 COMPLETE CBC AUTOMATED: CPT

## 2025-06-26 RX ADMIN — WATER 2000 MG: 1 INJECTION INTRAMUSCULAR; INTRAVENOUS; SUBCUTANEOUS at 09:41

## 2025-06-26 RX ADMIN — APIXABAN 5 MG: 5 TABLET, FILM COATED ORAL at 09:39

## 2025-06-26 RX ADMIN — METOPROLOL SUCCINATE 25 MG: 25 TABLET, EXTENDED RELEASE ORAL at 09:39

## 2025-06-26 RX ADMIN — FINASTERIDE 5 MG: 5 TABLET, FILM COATED ORAL at 09:38

## 2025-06-26 RX ADMIN — PANTOPRAZOLE SODIUM 40 MG: 40 TABLET, DELAYED RELEASE ORAL at 07:42

## 2025-06-26 RX ADMIN — SODIUM CHLORIDE, PRESERVATIVE FREE 10 ML: 5 INJECTION INTRAVENOUS at 09:42

## 2025-06-26 RX ADMIN — INSULIN LISPRO 2 UNITS: 100 INJECTION, SOLUTION INTRAVENOUS; SUBCUTANEOUS at 09:40

## 2025-06-26 RX ADMIN — TAMSULOSIN HYDROCHLORIDE 0.4 MG: 0.4 CAPSULE ORAL at 09:38

## 2025-06-26 RX ADMIN — MAGNESIUM SULFATE HEPTAHYDRATE 2000 MG: 40 INJECTION, SOLUTION INTRAVENOUS at 00:56

## 2025-06-26 RX ADMIN — Medication 1 TABLET: at 09:38

## 2025-06-26 RX ADMIN — ALLOPURINOL 200 MG: 100 TABLET ORAL at 09:39

## 2025-06-26 RX ADMIN — WATER 2000 MG: 1 INJECTION INTRAMUSCULAR; INTRAVENOUS; SUBCUTANEOUS at 00:43

## 2025-06-26 RX ADMIN — AMLODIPINE BESYLATE 5 MG: 5 TABLET ORAL at 09:39

## 2025-06-26 RX ADMIN — GLIPIZIDE 10 MG: 5 TABLET ORAL at 07:42

## 2025-06-26 ASSESSMENT — PAIN SCALES - GENERAL
PAINLEVEL_OUTOF10: 0

## 2025-06-26 NOTE — PLAN OF CARE
Problem: Discharge Planning  Goal: Discharge to home or other facility with appropriate resources  6/26/2025 0001 by Rina Gamble, RN  Outcome: Progressing  6/25/2025 1824 by Jyoti Alva, RN  Outcome: Progressing  Flowsheets (Taken 6/25/2025 1811)  Discharge to home or other facility with appropriate resources: Identify barriers to discharge with patient and caregiver  6/25/2025 1634 by Brittni Santana, RN  Outcome: Progressing

## 2025-06-26 NOTE — CARE COORDINATION
Case Management Assessment  Initial Evaluation    Date/Time of Evaluation: 6/26/2025 9:33 AM  Assessment Completed by: Mitul Grimes RN    If patient is discharged prior to next notation, then this note serves as note for discharge by case management.    Patient Name: Manny Ceballos                   YOB: 1945  Diagnosis: Nodular calcific aortic valve stenosis [I35.0]                   Date / Time: 6/25/2025  5:13 AM    Patient Admission Status: Inpatient   Readmission Risk (Low < 19, Mod (19-27), High > 27): Readmission Risk Score: 9.9    Current PCP: Rustam Pastrana, DO  PCP verified by CM? Yes    Chart Reviewed: Yes      History Provided by: Patient  Patient Orientation: Alert and Oriented    Patient Cognition: Alert    Hospitalization in the last 30 days (Readmission):  No    If yes, Readmission Assessment in CM Navigator will be completed.    Advance Directives:      Code Status: Full Code   Patient's Primary Decision Maker is: Legal Next of Kin    Primary Decision Maker: Pratibha Ceballos - Spouse - [phone]    Secondary Decision Maker: Raissa Tejada - Child - [phone]    Discharge Planning:    Patient lives with: Spouse/Significant Other Type of Home: House  Primary Care Giver: Self  Patient Support Systems include: Spouse/Significant Other, Children   Current Financial resources:    Current community resources:    [facility] to [facility]: [facility]            Current DME:              Type of Home Care services:  None    ADLS  Prior functional level: Independent in ADLs/IADLs  Current functional level: Independent in ADLs/IADLs    PT AM-PAC:   /24  OT AM-PAC:   /24    Family can provide assistance at DC: Yes  Would you like Case Management to discuss the discharge plan with any other family members/significant others, and if so, who? Yes (wife)  Plans to Return to Present Housing: Yes  Other Identified Issues/Barriers to RETURNING to current housing:   Potential Assistance

## 2025-06-26 NOTE — CARE COORDINATION
Chart reviewed for transition of care at discharge. Admitted with nodular calcific aortic valve stenosis. S/P TAVR on 6/25/25. Met with patient to discuss discharge planning. He stated he is independent and uses no DME. He has stored 3 WW, transport w/c, shower chair and bsc if ever needed. No history of HHC or SNF. His PCP is Dr Pastrana and his pharmacy is [facility] in [place]. No d/c needs voiced. His discharge plan is home when medically stable, with his wife transporting. CM will follow.  Electronically signed by Mitul Grimes RN on 6/26/2025 at 9:30 AM

## 2025-06-26 NOTE — PROGRESS NOTES
[facility]   PRE-ADMISSION TESTING GENERAL INSTRUCTIONS  PAT Phone Number: [phone]      GENERAL INSTRUCTIONS:    [x] Hibiclens shower the night before AND the morning of surgery.   -Do not use Hibiclens on your face or head.  [x] Do not wear makeup, lotions, powders, deodorant the morning of surgery.  [x] Nothing to eat or drink after midnight. This includes no gum, candy, mints or water.  [x] You may brush your teeth, gargle, but do NOT swallow water.   [x] No tobacco products, illegal drugs, or alcohol within 24 hours of your surgery.  [x] Jewelry or valuables should not be brought to the hospital. All body and/or tongue piercing's must be removed prior to arriving to hospital. No contact lens or hair pins.   [x] Arrange transportation with a responsible adult  to and from the hospital. Arrange for someone to be with you for the remainder of the day and for 24 hours after your procedure due to having had anesthesia.          -Who will be your  for transportation? Pratibha, wife    [x] Bring insurance card and photo ID.  [x] Bring copy of living will or healthcare power of  papers to be placed in your electronic record.    [x] Transfusion (Green) Bracelet: Please bring with you to hospital, day of surgery.     PARKING INSTRUCTIONS:     [x] ARRIVAL DATE & TIME: 6/25 5 am  [x] Times are subject to change. We will contact you the business day before surgery if that were to occur.  [x] Enter into the [facility] Entrance. Two adults may accompany you. Masks are not required.  [x] Parking Lot \"I\" is where you will park. It is located on the corner of [address] and [address]. The entrance is on [address].   Only one vehicle - per patient, is permitted in parking lot.   Upon entering the parking lot, a voucher ticket will print.    EDUCATION INSTRUCTIONS:             [x] Pre-admission Testing educational folder given  [x] Incentive 
4 Eyes Skin Assessment     NAME:  Manny Ceballos  YOB: 1945  MEDICAL RECORD NUMBER:  [id number]    The patient is being assessed for  Admission    I agree that at least one RN has performed a thorough Head to Toe Skin Assessment on the patient. ALL assessment sites listed below have been assessed.      Areas assessed by both nurses:    Head, Face, Ears, Shoulders, Back, Chest, Arms, Elbows, Hands, Sacrum. Buttock, Coccyx, Ischium, Legs. Feet and Heels, and Under Medical Devices         Does the Patient have a Wound? No noted wound(s)       Ronak Prevention initiated by RN: No  Wound Care Orders initiated by RN: No    Pressure Injury (Stage 3,4, Unstageable, DTI, NWPT, and Complex wounds) if present, place Wound referral order by RN under : No    New Ostomies, if present place, Ostomy referral order under : No     Nurse 1 eSignature: Electronically signed by WEI SOTO RN on 6/25/25 at 6:26 PM EDT    **SHARE this note so that the co-signing nurse can place an eSignature**    Nurse 2 eSignature: Electronically signed by Linda Aguilera RN on 6/25/25 at 6:33 PM EDT  
4 Eyes Skin Assessment     NAME:  Manny Ceballos  YOB: 1945  MEDICAL RECORD NUMBER:  [id number]    The patient is being assessed for  Admission    I agree that at least one RN has performed a thorough Head to Toe Skin Assessment on the patient. ALL assessment sites listed below have been assessed.      Areas assessed by both nurses:    Head, Face, Ears, Shoulders, Back, Chest, Arms, Elbows, Hands, Sacrum. Buttock, Coccyx, Ischium, Legs. Feet and Heels, and Under Medical Devices         Does the Patient have a Wound? No noted wound(s)       Ronak Prevention initiated by RN: No  Wound Care Orders initiated by RN: No    Pressure Injury (Stage 3,4, Unstageable, DTI, NWPT, and Complex wounds) if present, place Wound referral order by RN under : No    New Ostomies, if present place, Ostomy referral order under : No     Nurse 1 eSignature: Electronically signed by Brittni Santana RN on 6/25/25 at 4:33 PM EDT    **SHARE this note so that the co-signing nurse can place an eSignature**    Nurse 2 eSignature: Electronically signed by Eduin Diallo RN on 6/25/25 at 4:33 PM EDT  
EMR indicates patient is taking Buffered aspirin 325 mg she states patient is to take day of surgery.     3 pm Left message for JAYESH MCCARTY spoke with patient he states he is no longer taking Buffered aspirin or any aspirin at all..    
Fairly quiet meeting.  
Monitor dcd cleaned and placed in slot in nurses station. Discharge instructions and meds reviewed with patient. Left in wheelchair with all belongings that were documented that she came in with.   
Patient's spouse reports patient had history of MRSA from a nasal abscess. She states he no longer has it and denies any wounds.   Spoke with Caitie HENDRICKS, infection control discussed patient has MRSA infection listed in his EMR .  Informed her what spouse said as noted above.   
Pharmacy Note    This patient was ordered azelastine nasal spray. Per the Pharmacy & Therapeutics Committee, this medication is non-formulary and not stocked by pharmacy for the reason indicated below. The medication can be reordered at discharge.     Medications in which risks outweigh benefits during hospitalization:           -  oral bisphosphonates         -  raloxifene (Evista)        -  SGLT2 inhibitors (ordered in the hospital for an indication other than heart failure or chronic kidney disease)    Medications that lack necessity during an acute hospital stay:        -  nasal antihistamines        -  nasal ipratropium 0.03% and 0.06%        -  nasal miacalcin        -  acyclovir topical cream/ointment orders for herpes labialis (cold sores)    
Spiritual Health History and Assessment/Progress Note  [facility]    Initial Encounter, Spiritual/Emotional Needs,  ,  ,      Name: Manny Ceballos MRN: [id number]    Age: 79 y.o.     Sex: male   Language: English   Worship: Uatsdin   Nodular calcific aortic valve stenosis     Date: 6/26/2025                           Spiritual Assessment began in [facility]        Referral/Consult From: Rounding   Encounter Overview/Reason: Initial Encounter, Spiritual/Emotional Needs  Service Provided For: Patient, Significant other    Rosa, Belief, Meaning:   Patient identifies as spiritual  Family/Friends identify as spiritual      Importance and Influence:  Patient has spiritual/personal beliefs that influence decisions regarding their health  Family/Friends have spiritual/personal beliefs that influence decisions regarding the patient's health    Community:  Patient is connected with a spiritual community  Family/Friends are connected with a spiritual community:    Assessment and Plan of Care:     Patient Interventions include: Provided sacramental/Christian ritual  Family/Friends Interventions include: Provided sacramental/Christian ritual    Patient Plan of Care: Spiritual Care available upon further referral  Family/Friends Plan of Care: Spiritual Care available upon further referral    Electronically signed by Chaplain Nenita on 6/26/2025 at 12:49 PM   
Spiritual Health History and Assessment/Progress Note  [facility]    (P) Initial Encounter, Follow-up,  ,  ,      Name: Manny Ceballos MRN: [id number]    Age: 79 y.o.     Sex: male   Language: English   Anabaptism: Denominational   Nodular calcific aortic valve stenosis     Date: 6/26/2025                           Spiritual Assessment began in [facility]        Referral/Consult From: (P) Rounding   Encounter Overview/Reason: (P) Initial Encounter, Follow-up  Service Provided For: (P) Patient (Spouse)    The  visited with the patient and his wife whom he has been  to for over 54 years.  The Patient discussed his procedure and end of life desires. The Patient and his wife shared their rosa journey and how they are now longtime members of [facility] in [place]. The  visited him regarding POA Documentation.     Rosa, Belief, Meaning:   Patient identifies as spiritual, is connected with a rosa tradition or spiritual practice, and has beliefs or practices that help with coping during difficult times  Family/Friends identify as spiritual, are connected with a rosa tradition or spiritual practice, and have beliefs or practices that help with coping during difficult times      Importance and Influence:  Patient has spiritual/personal beliefs that influence decisions regarding their health  Family/Friends have spiritual/personal beliefs that influence decisions regarding the patient's health    Community:  Patient is connected with a spiritual community and feels well-supported. Support system includes: Spouse/Partner, Children, Rosa Community, and Extended family  Family/Friends are connected with a spiritual community: and feel well-supported. Support system includes: Spouse/Partner, Children, Rosa Community, and Extended family    Assessment and Plan of Care:     Patient Interventions include: Facilitated expression of thoughts and feelings, Explored spiritual 
luminal irregularities  LCX: Large, minimal irregularities  RCA: Large, mild diffuse 20% stenosis  LVEDP: 16 mmHg  Severe aortic stenosis with peak/mean gradients of 45/39 mmHg on simultaneous LV-AO pressure measurements, NOÉ 1.1 Cm2.  Right heart cath:  PA 26/16 mmHg (mean PAP 21 mmHg)  RV 27/9 mmHg  RA 12 mmHg  PCWP 13 mmHg  Shellie cardiac output of 7.6 L/min, cardiac index 3.3 L/min/m2    TAVR CTA measurements:  Annulus: 22x28  Area: 469  Perimeter: 78    Beryl Mckinney PAMAGAN  Structural Heart Coordinator

## 2025-06-26 NOTE — ACP (ADVANCE CARE PLANNING)
Advance Care Planning   The patient has the following advanced directives on file:  Advance Directives       Power of  Living Will ACP-Advance Directive ACP-Power of     Not on File Not on File Not on File Not on File            The patient has appointed the following active healthcare agents:    Primary Decision Maker: JavierorMaePratibha - Spouse - [phone]    Secondary Decision Maker: TerrellRaissa - Child - [phone]    The Patient has the following current code status:    Code Status: Full Code    Mitul Grimes RN  6/26/2025

## 2025-06-26 NOTE — CONSULTS
Met with patient and discussed that their physician has ordered a referral to our outpatient Phase II Cardiac Rehabilitation program. Reviewed the benefits of cardiac rehabilitation based on their diagnosis and personal risk factors. Patient demonstrates mild interest in Cardiac Rehabilitation at this time.  Cardiac Rehabilitation brochure provided to patient/family. The Cardiac Rehabilitation Program has been provided the patient's referral information and pertinent patient details and history. The patient may call [facility] at [phone] for additional information or questions. Contact information for [facility] and other choices close to the patient's residence have been provided in the discharge instructions so that the patient may call and schedule an appointment when cleared by their physician. Thank you for the referral.

## 2025-06-27 ENCOUNTER — TELEPHONE (OUTPATIENT)
Dept: ENT CLINIC | Age: 80
End: 2025-06-27

## 2025-06-27 NOTE — TELEPHONE ENCOUNTER
[facility] to authorize order with patient insurance. Patient is scheduled for CT Sinus with radiology on 10/27/25 @ 9:00am. Patient has been notified of date and time and that they need to arrive at 8:45am. Patient was informed he has no prep prior to procedure. Patient instructed to park in [facility] parking lot and report to registration. Patient's wife Pratibha verbalized understanding.    Electronically signed by Amalia Monahan MA on 6/27/25 at 9:18 AM EDT

## 2025-06-30 ENCOUNTER — TELEPHONE (OUTPATIENT)
Dept: CARDIOLOGY CLINIC | Age: 80
End: 2025-06-30

## 2025-06-30 ENCOUNTER — OFFICE VISIT (OUTPATIENT)
Age: 80
End: 2025-06-30

## 2025-06-30 VITALS
TEMPERATURE: 98.2 F | BODY MASS INDEX: 31.23 KG/M2 | HEIGHT: 72 IN | OXYGEN SATURATION: 96 % | DIASTOLIC BLOOD PRESSURE: 66 MMHG | SYSTOLIC BLOOD PRESSURE: 125 MMHG | RESPIRATION RATE: 16 BRPM | WEIGHT: 230.6 LBS | HEART RATE: 90 BPM

## 2025-06-30 DIAGNOSIS — E11.9 TYPE II DIABETES MELLITUS WITH HBA1C GOAL TO BE DETERMINED (HCC): ICD-10-CM

## 2025-06-30 DIAGNOSIS — Z95.2 STATUS POST TRANSCATHETER AORTIC VALVE REPLACEMENT: ICD-10-CM

## 2025-06-30 DIAGNOSIS — Z09 HOSPITAL DISCHARGE FOLLOW-UP: Primary | ICD-10-CM

## 2025-06-30 DIAGNOSIS — I48.0 PAF (PAROXYSMAL ATRIAL FIBRILLATION) (HCC): ICD-10-CM

## 2025-06-30 DIAGNOSIS — I10 ESSENTIAL HYPERTENSION: ICD-10-CM

## 2025-06-30 DIAGNOSIS — E11.3293 MILD NONPROLIFERATIVE DIABETIC RETINOPATHY OF BOTH EYES WITHOUT MACULAR EDEMA ASSOCIATED WITH TYPE 2 DIABETES MELLITUS (HCC): ICD-10-CM

## 2025-06-30 PROBLEM — J81.0 ACUTE PULMONARY EDEMA (HCC): Status: RESOLVED | Noted: 2023-06-01 | Resolved: 2025-06-30

## 2025-06-30 NOTE — PROGRESS NOTES
Post-Discharge Transitional Care  Follow Up      Manny Ceballos   YOB: 1945    Date of Office Visit:  6/30/2025  Date of Hospital Admission: 6/25/25  Date of Hospital Discharge: 6/26/25  Risk of hospital readmission (high >=14%. Medium >=10%) :Readmission Risk Score: 11.1      Care management risk score Rising risk (score 2-5) and Complex Care (Scores >=6): No Risk Score On File     Non face to face  following discharge, date last encounter closed (first attempt may have been earlier): *No documented post hospital discharge outreach found in the last 14 days    Call initiated 2 business days of discharge: *No response recorded in the last 14 days    ASSESSMENT/PLAN:   Assessment & Plan         Medical Decision Making: moderate complexity  Return if symptoms worsen or fail to improve.         In summary I am happy with the way this patient is doing I told him to just take it easy stay in the cool house he can progress his walking around the house he will talk to the cardiologist about doing some cardiac rehab I will keep his visit in October overall he looks good his incisions are healed very nicely his cardiopulmonary status is stable meds are updated  Subjective:   Inpatient course: Discharge summary reviewed- see chart.    History of Present Illness  The patient is an 79-year-old male who presents today for a post-hospital visit. He was admitted on 06/25/2025 and discharged on 06/26/2025 at [facility] for a TAVR procedure. He has severe aortic stenosis, underlying hypertension, type 2 diabetes with diabetic retinopathy, paroxysmal atrial fibrillation, and is post-transcatheter aortic valve replacement. He appears well but understandably fatigued. His vital signs are stable, with a blood pressure reading of 125/66. He has an updated medication list. He is accompanied by his wife.         Patient Active Problem List   Diagnosis    Pneumonia    Type II diabetes mellitus with HbA1C

## 2025-06-30 NOTE — TELEPHONE ENCOUNTER
Patient needs [facility] f/u. Wife Pratibha can be reached at [phone]

## 2025-07-02 ENCOUNTER — TELEPHONE (OUTPATIENT)
Dept: CARDIOLOGY CLINIC | Age: 80
End: 2025-07-02

## 2025-07-02 NOTE — TELEPHONE ENCOUNTER
Dr. Mast,     Patient had a Valve Replacement, and he has been very dizzy, weak And very tired.     Please Advise.

## 2025-07-03 ENCOUNTER — OFFICE VISIT (OUTPATIENT)
Dept: CARDIOLOGY CLINIC | Age: 80
End: 2025-07-03
Payer: MEDICARE

## 2025-07-03 VITALS
SYSTOLIC BLOOD PRESSURE: 136 MMHG | DIASTOLIC BLOOD PRESSURE: 80 MMHG | WEIGHT: 228 LBS | RESPIRATION RATE: 18 BRPM | HEART RATE: 85 BPM | HEIGHT: 72 IN | BODY MASS INDEX: 30.88 KG/M2

## 2025-07-03 DIAGNOSIS — I44.1 AV BLOCK, 2ND DEGREE: Primary | ICD-10-CM

## 2025-07-03 DIAGNOSIS — R42 DIZZINESS: ICD-10-CM

## 2025-07-03 DIAGNOSIS — I35.0 NONRHEUMATIC AORTIC VALVE STENOSIS: ICD-10-CM

## 2025-07-03 DIAGNOSIS — Z95.2 HISTORY OF TRANSCATHETER AORTIC VALVE REPLACEMENT (TAVR): ICD-10-CM

## 2025-07-03 PROCEDURE — 1123F ACP DISCUSS/DSCN MKR DOCD: CPT | Performed by: PHYSICIAN ASSISTANT

## 2025-07-03 PROCEDURE — 1036F TOBACCO NON-USER: CPT | Performed by: PHYSICIAN ASSISTANT

## 2025-07-03 PROCEDURE — 1111F DSCHRG MED/CURRENT MED MERGE: CPT | Performed by: PHYSICIAN ASSISTANT

## 2025-07-03 PROCEDURE — 3074F SYST BP LT 130 MM HG: CPT | Performed by: PHYSICIAN ASSISTANT

## 2025-07-03 PROCEDURE — 99214 OFFICE O/P EST MOD 30 MIN: CPT | Performed by: PHYSICIAN ASSISTANT

## 2025-07-03 PROCEDURE — 93000 ELECTROCARDIOGRAM COMPLETE: CPT | Performed by: PHYSICIAN ASSISTANT

## 2025-07-03 PROCEDURE — 3078F DIAST BP <80 MM HG: CPT | Performed by: PHYSICIAN ASSISTANT

## 2025-07-03 PROCEDURE — G8417 CALC BMI ABV UP PARAM F/U: HCPCS | Performed by: PHYSICIAN ASSISTANT

## 2025-07-03 PROCEDURE — 1159F MED LIST DOCD IN RCRD: CPT | Performed by: PHYSICIAN ASSISTANT

## 2025-07-03 PROCEDURE — G8427 DOCREV CUR MEDS BY ELIG CLIN: HCPCS | Performed by: PHYSICIAN ASSISTANT

## 2025-07-03 NOTE — PROGRESS NOTES
[facility]  Visit Note      Patient name: Manny Ceballos    Reason for consult: TAVR follow up    Primary Care Physician: Rustam Pastrana DO    Date of service: 7/23/2025    Chief Complaint: TAVR follow up - groin check    HPI: Mr. Ceballos presents for follow up s/p TAVR on 6/25/25. He notes fatigue and dizziness since the procedure. He denies chest pain, sob, orthopnea, PND, LE edema or groin concerns. No falls or syncope.    Allergies: No Known Allergies    Home medications:    Current Outpatient Medications   Medication Sig Dispense Refill    melatonin 3 MG TABS tablet Take 5 mg by mouth daily      NONFORMULARY Pt uses breathe right strips to help sleep.      azelastine (ASTELIN) 0.1 % nasal spray 1 spray by Nasal route 2 times daily Use in each nostril as directed      amLODIPine (NORVASC) 5 MG tablet Take 1 tablet by mouth daily (Patient taking differently: Take 1 tablet by mouth every morning) 30 tablet 6    metoprolol succinate (TOPROL XL) 25 MG extended release tablet Take 1 tablet by mouth daily (Patient taking differently: Take 1 tablet by mouth every morning) 30 tablet 0    metFORMIN (GLUCOPHAGE) 1000 MG tablet TAKE 1 TABLET TWICE A  tablet 3    fluticasone (FLONASE) 50 MCG/ACT nasal spray 2 sprays by Each Nostril route daily 3 each 3    tamsulosin (FLOMAX) 0.4 MG capsule Take 1 capsule by mouth daily 90 capsule 3    allopurinol (ZYLOPRIM) 100 MG tablet Take 2 tablets by mouth daily 180 tablet 3    finasteride (PROSCAR) 5 MG tablet Take 1 tablet by mouth daily 90 tablet 3    glimepiride (AMARYL) 4 MG tablet Take 1 tablet by mouth every morning (before breakfast) 90 tablet 3    omeprazole (PRILOSEC) 20 MG delayed release capsule Take 1 capsule by mouth daily (Patient taking differently: Take 1 capsule by mouth every morning) 90 capsule 3    Accu-Chek Softclix Lancets MISC USE  1 x DAILY AS DIRECTEDDX:E11.9      blood glucose test strips (EXACTECH TEST) strip 1 each daily      
**You were seen in the ED for a Right Heel Laceration which was repaired with 8 sutures in the ED**    1. Please follow up with your Primary Medical Doctor after discharge to discuss ED visit      2. Keep sutures clean and dry for the next 24-48 hours, you may then clean gently with soap and water    3. Apply bacitracin ointment to area twice a day for the next 5 days     4. Please have Sutures removed at your nursing facility  in 10 days, if sutures are not able to be removed at the facility please return to ED in 10 days for suture removal or return      5. Return to the ED for any persistent/worsening or new symptoms including  fevers, redness, swelling, discharge, pain or any other concerns

## 2025-07-05 LAB
ABO/RH: NORMAL
ANTIBODY SCREEN: NEGATIVE
ARM BAND NUMBER: NORMAL
BLOOD BANK DISPENSE STATUS: NORMAL
BLOOD BANK SAMPLE EXPIRATION: NORMAL
BPU ID: NORMAL
COMPONENT: NORMAL
CROSSMATCH RESULT: NORMAL
TRANSFUSION STATUS: NORMAL
UNIT DIVISION: 0

## 2025-07-07 ENCOUNTER — TELEPHONE (OUTPATIENT)
Dept: CARDIOLOGY CLINIC | Age: 80
End: 2025-07-07

## 2025-07-24 ENCOUNTER — TELEPHONE (OUTPATIENT)
Age: 80
End: 2025-07-24

## 2025-07-24 NOTE — TELEPHONE ENCOUNTER
Left voicemail reminded patient of appointment on 7/29/25 at [facility].  Echocardiogram at 1230, visit to follow with Beryl Mckinney.

## 2025-07-25 PROBLEM — Z01.810 PRE-OPERATIVE CARDIOVASCULAR EXAMINATION: Status: RESOLVED | Noted: 2025-06-25 | Resolved: 2025-07-25

## 2025-07-28 DIAGNOSIS — Z95.2 HISTORY OF TRANSCATHETER AORTIC VALVE REPLACEMENT (TAVR): Primary | ICD-10-CM

## 2025-07-29 ENCOUNTER — HOSPITAL ENCOUNTER (OUTPATIENT)
Dept: CARDIOLOGY | Age: 80
Discharge: HOME | End: 2025-07-31
Payer: MEDICARE

## 2025-07-29 ENCOUNTER — OFFICE VISIT (OUTPATIENT)
Dept: CARDIOLOGY CLINIC | Age: 80
End: 2025-07-29
Payer: MEDICARE

## 2025-07-29 VITALS
DIASTOLIC BLOOD PRESSURE: 60 MMHG | BODY MASS INDEX: 31.29 KG/M2 | HEART RATE: 66 BPM | OXYGEN SATURATION: 97 % | SYSTOLIC BLOOD PRESSURE: 124 MMHG | WEIGHT: 231 LBS | HEIGHT: 72 IN | RESPIRATION RATE: 18 BRPM

## 2025-07-29 DIAGNOSIS — I35.0 NONRHEUMATIC AORTIC VALVE STENOSIS: ICD-10-CM

## 2025-07-29 DIAGNOSIS — I70.1 RENAL ARTERY STENOSIS: ICD-10-CM

## 2025-07-29 DIAGNOSIS — Z95.2 HISTORY OF TRANSCATHETER AORTIC VALVE REPLACEMENT (TAVR): Primary | ICD-10-CM

## 2025-07-29 DIAGNOSIS — Z95.2 HISTORY OF TRANSCATHETER AORTIC VALVE REPLACEMENT (TAVR): ICD-10-CM

## 2025-07-29 LAB
ECHO AO ASC DIAM: 3.6 CM
ECHO AV ACCELERATION TIME: 89.2 MS
ECHO AV AREA PEAK VELOCITY: 2 CM2
ECHO AV AREA VTI: 2 CM2
ECHO AV MEAN GRADIENT: 9 MMHG
ECHO AV MEAN VELOCITY: 1.4 M/S
ECHO AV PEAK GRADIENT: 18 MMHG
ECHO AV PEAK VELOCITY: 2.1 M/S
ECHO AV VELOCITY RATIO: 0.52
ECHO AV VTI: 45.1 CM
ECHO EST RA PRESSURE: 3 MMHG
ECHO LA DIAMETER: 5 CM
ECHO LA VOL A-L A2C: 102 ML (ref 18–58)
ECHO LA VOL A-L A4C: 125 ML (ref 18–58)
ECHO LA VOL MOD A2C: 95 ML (ref 18–58)
ECHO LA VOL MOD A4C: 121 ML (ref 18–58)
ECHO LA VOLUME AREA LENGTH: 116 ML
ECHO LV E' LATERAL VELOCITY: 6 CM/S
ECHO LV E' SEPTAL VELOCITY: 4 CM/S
ECHO LV EF PHYSICIAN: 60 %
ECHO LV FRACTIONAL SHORTENING: 28 % (ref 28–44)
ECHO LV INTERNAL DIMENSION DIASTOLIC: 4.6 CM (ref 4.2–5.9)
ECHO LV INTERNAL DIMENSION SYSTOLIC: 3.3 CM
ECHO LV ISOVOLUMETRIC RELAXATION TIME (IVRT): 69.2 MS
ECHO LV IVSD: 1.3 CM (ref 0.6–1)
ECHO LV IVSS: 1.8 CM
ECHO LV MASS 2D: 217.4 G (ref 88–224)
ECHO LV POSTERIOR WALL DIASTOLIC: 1.2 CM (ref 0.6–1)
ECHO LV POSTERIOR WALL SYSTOLIC: 1.7 CM
ECHO LV RELATIVE WALL THICKNESS RATIO: 0.52
ECHO LVOT AREA: 3.8 CM2
ECHO LVOT AV VTI INDEX: 0.52
ECHO LVOT DIAM: 2.2 CM
ECHO LVOT MEAN GRADIENT: 2 MMHG
ECHO LVOT PEAK GRADIENT: 5 MMHG
ECHO LVOT PEAK VELOCITY: 1.1 M/S
ECHO LVOT SV: 88.5 ML
ECHO LVOT VTI: 23.3 CM
ECHO MV "A" WAVE DURATION: 161.5 MSEC
ECHO MV A VELOCITY: 0.74 M/S
ECHO MV AREA PHT: 3.9 CM2
ECHO MV AREA VTI: 3.5 CM2
ECHO MV E DECELERATION TIME (DT): 230.3 MS
ECHO MV E VELOCITY: 0.94 M/S
ECHO MV E/A RATIO: 1.27
ECHO MV E/E' LATERAL: 15.67
ECHO MV E/E' RATIO (AVERAGED): 19.58
ECHO MV E/E' SEPTAL: 23.5
ECHO MV LVOT VTI INDEX: 1.09
ECHO MV MAX VELOCITY: 1 M/S
ECHO MV MEAN GRADIENT: 2 MMHG
ECHO MV MEAN VELOCITY: 0.6 M/S
ECHO MV PEAK GRADIENT: 4 MMHG
ECHO MV PRESSURE HALF TIME (PHT): 56 MS
ECHO MV VTI: 25.3 CM
ECHO PV MAX VELOCITY: 1.2 M/S
ECHO PV MEAN GRADIENT: 3 MMHG
ECHO PV MEAN VELOCITY: 0.8 M/S
ECHO PV PEAK GRADIENT: 6 MMHG
ECHO PV VTI: 24.2 CM
ECHO PVEIN PEAK D VELOCITY: 0.5 M/S
ECHO PVEIN PEAK S VELOCITY: 0.3 M/S
ECHO PVEIN S/D RATIO: 0.6
ECHO RIGHT VENTRICULAR SYSTOLIC PRESSURE (RVSP): 33 MMHG
ECHO RV FREE WALL PEAK S': 15 CM/S
ECHO RV INTERNAL DIMENSION: 3.5 CM
ECHO RV TAPSE: 2.4 CM (ref 1.7–?)
ECHO TV REGURGITANT MAX VELOCITY: 2.75 M/S
ECHO TV REGURGITANT PEAK GRADIENT: 30 MMHG

## 2025-07-29 PROCEDURE — 1036F TOBACCO NON-USER: CPT | Performed by: PHYSICIAN ASSISTANT

## 2025-07-29 PROCEDURE — 3074F SYST BP LT 130 MM HG: CPT | Performed by: PHYSICIAN ASSISTANT

## 2025-07-29 PROCEDURE — 1123F ACP DISCUSS/DSCN MKR DOCD: CPT | Performed by: PHYSICIAN ASSISTANT

## 2025-07-29 PROCEDURE — 1159F MED LIST DOCD IN RCRD: CPT | Performed by: PHYSICIAN ASSISTANT

## 2025-07-29 PROCEDURE — 99214 OFFICE O/P EST MOD 30 MIN: CPT | Performed by: PHYSICIAN ASSISTANT

## 2025-07-29 PROCEDURE — G8427 DOCREV CUR MEDS BY ELIG CLIN: HCPCS | Performed by: PHYSICIAN ASSISTANT

## 2025-07-29 PROCEDURE — 93306 TTE W/DOPPLER COMPLETE: CPT | Performed by: INTERNAL MEDICINE

## 2025-07-29 PROCEDURE — 3078F DIAST BP <80 MM HG: CPT | Performed by: PHYSICIAN ASSISTANT

## 2025-07-29 PROCEDURE — 93306 TTE W/DOPPLER COMPLETE: CPT

## 2025-07-29 PROCEDURE — G8417 CALC BMI ABV UP PARAM F/U: HCPCS | Performed by: PHYSICIAN ASSISTANT

## 2025-07-29 ASSESSMENT — KANSAS CITY CARDIOMYOPATHY QUESTIONNAIRE (KCCQ12)
8C. OVER THE PAST 2 WEEKS, ON AVERAGE, HOW HAS HEART FAILURE LIMITED YOU ABILITY TO VISIT FAMILY AND FRIENDS OUR OF YOUR HOME: DID NOT LIMIT AT ALL
1B. OVER THE PAST 2 WEEKS, HOW MUCH WERE YOU LIMITED BY HEART FAILURE SYMPTOMS (SHORTNESS OF BREATH OR FATIGUE) WHEN WALKING 1 BLOCK ON LEVEL GROUND: NOT AT ALL LIMITED
7. IF YOU HAD TO SPEND THE REST OF YOUR LIFE WITH YOUR HEART FAILURE THE WAY IT IS RIGHT NOW, HOW WOULD YOU FEEL ABOUT THIS?: COMPLETELY SATISFIED
6. OVER THE PAST 2 WEEKS, HOW MUCH HAS YOUR HEART FAILURE LIMITED YOUR ENJOYMENT OF LIFE: IT HAS NOT LIMITED MY ENJOYMENT OF LIFE AT ALL
8B. OVER THE PAST 2 WEEKS, ON AVERAGE, HOW HAS HEART FAILURE LIMITED YOU ABILITY TO WORK OR DO HOUSEHOLD CHORES: DID NOT LIMIT AT ALL
1A. OVER THE PAST 2 WEEKS, HOW MUCH WERE YOU LIMITED BY HEART FAILURE SYMPTOMS (SHORTNESS OF BREATH OR FATIGUE) WHEN SHOWERING OR BATHING: NOT AT ALL LIMITED
1C. OVER THE PAST 2 WEEKS, HOW MUCH WERE YOU LIMITED BY HEART FAILURE SYMPTOMS (SHORTNESS OF BREATH OR FATIGUE) WHEN HURRYING OR JOGGING (AS IF TO CATCH A BUS): NOT AT ALL LIMITED
8A. OVER THE PAST 2 WEEKS, ON AVERAGE, HOW HAS HEART FAILURE LIMITED YOU ABILITY TO DO HOBBIES OR RECREATIONAL ACTIVITIES: DID NOT LIMIT AT ALL
5. OVER THE PAST 2 WEEKS, ON AVERAGE, HOW MANY TIMES HAVE YOU BEEN FORCED TO SLEEP SITTING UP IN A CHAIR OR WITH AT LEAST 3 PILLOWS TO PROP YOU UP BECAUSE OF SHORTNESS OF BREATH?: NEVER OVER THE PAST 2 WEEKS
3. OVER THE PAST 2 WEEKS, ON AVERAGE, HOW MANY TIMES HAS FATIGUE LIMITED YOUR ABILITY TO DO WHAT YOU WANTED: NEVER OVER THE PAST 2 WEEKS
2. OVER THE PAST 2 WEEKS, HOW MANY TIMES DID YOU HAVE SWELLING IN YOUR FEET, ANKLES OR LEGS WHEN YOU WOKE UP IN THE MORNING: NEVER OVER THE PAST 2 WEEKS

## 2025-07-29 NOTE — PATIENT INSTRUCTIONS
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

## 2025-07-29 NOTE — PROGRESS NOTES
GERD/Alonzo's esophagus  Dizziness - resolved  Bilateral MICHAEL noted on preop CTAs    NYHA class I      Plan:   Follow up for one year echo 6/23/26 at 8:00 am; sooner PRN  Follow up with Carlitos Godoy and Soraida as scheduled  SBE prophylaxis reviewed  Cardiac rehab referral ordered   Vascular surgery referral regarding renal artery stenosis   Patient is interested in [facility] [place] the road; advised him both Dr. Mast and Dr. Quigley perform this procedure. He will let us know when/with who he'd like to proceed with consultation    TAVR CTA and carotid ultrasound reports reviewed    Electronically signed by Beryl Mckinney PA-C on 7/29/2025 at 2:01 PM

## 2025-07-30 ENCOUNTER — TELEPHONE (OUTPATIENT)
Dept: VASCULAR SURGERY | Age: 80
End: 2025-07-30

## 2025-07-31 ENCOUNTER — TELEPHONE (OUTPATIENT)
Dept: CARDIAC REHAB | Age: 80
End: 2025-07-31

## 2025-08-04 ENCOUNTER — TELEPHONE (OUTPATIENT)
Dept: CARDIAC REHAB | Age: 80
End: 2025-08-04

## (undated) DEVICE — SURGICAL PROCEDURE PACK EENT CUST

## (undated) DEVICE — LABEL MED CARD SURG 4 IN PANEL STRL

## (undated) DEVICE — SURGICAL PROCEDURE KIT 2 W

## (undated) DEVICE — GOWN,SIRUS,FABRNF,XL,20/CS: Brand: MEDLINE

## (undated) DEVICE — BLADE,STAINLESS-STEEL,10,STRL,DISPOSABLE: Brand: MEDLINE

## (undated) DEVICE — NDLHOLDER F/P WEBSTER FN8064 S: Brand: CENTURION MEDICAL PRODUCTS CORP

## (undated) DEVICE — COVER,TABLE,60X90,STERILE: Brand: MEDLINE

## (undated) DEVICE — MEDIA CONTRAST ISOVUE 300 100ML

## (undated) DEVICE — BLADE,STAINLESS-STEEL,11,STRL,DISPOSABLE: Brand: MEDLINE

## (undated) DEVICE — STERILE (15.2 TAPERED TO 7.6 X 183CM) POLYETHYLENE ACCORDION-FOLDED COVER FOR USE WITH SIEMENS ACUNAV ULTRASOUND CATHETER FAMILY CONNECTOR: Brand: SWIFTLINK TRANSDUCER COVER

## (undated) DEVICE — PINNACLE INTRODUCER SHEATH: Brand: PINNACLE

## (undated) DEVICE — CATHETER DIAG L110CM OD5FR 5MM SPC QUADRAPOLAR WEBST

## (undated) DEVICE — GUIDEWIRE ANGIO L150CM OD0.035IN STR FIX PTFE SLD SUPP

## (undated) DEVICE — BASIC SINGLE BASIN 1-LF: Brand: MEDLINE INDUSTRIES, INC.

## (undated) DEVICE — GLOVE SURG 7 PF POLYMER COAT WHT STRL SIGN LTX ESSENTIAL LTX

## (undated) DEVICE — GUIDEWIRE VASC L260CM 0.035IN J TIP L3MM PTFE FIX COR NAMIC

## (undated) DEVICE — TAPE ADH W2INXL10YD PLAS TRNSPAR H2O RESIST HYPOALRG CURAD

## (undated) DEVICE — SYRINGE MED 10ML LUERLOCK TIP W/O SFTY DISP

## (undated) DEVICE — COVER PRB W14XL147CM TELESCOPICALLY FLD EXT LEN CIV-FLEX

## (undated) DEVICE — BLADE,STAINLESS-STEEL,20,STRL,DISPOSABLE: Brand: MEDLINE

## (undated) DEVICE — TUBING SUCT 12FR MAL ALUM SHFT FN CAP VENT UNIV CONN W/ OBT

## (undated) DEVICE — PERCLOSE™ PROSTYLE™ SUTURE-MEDIATED CLOSURE AND REPAIR SYSTEM: Brand: PERCLOSE™ PROSTYLE™

## (undated) DEVICE — SET SURG BASIN MAYO REUSABLE

## (undated) DEVICE — 3M™ IOBAN™ 2 ANTIMICROBIAL INCISE DRAPE 6650EZ: Brand: IOBAN™ 2

## (undated) DEVICE — SURGICAL PROCEDURE TRAY EPIDURAL CUST

## (undated) DEVICE — SWAN-GANZ TRUE SIZE THERMODULTION CATHETER, 5F: Brand: SWAN-GANZ TRUE SIZE

## (undated) DEVICE — GUIDEWIRE VASC L260CM DIA0.035IN BRECKER FOR COREVLV

## (undated) DEVICE — TUBING PRSS MON L48IN PVC RIG NONEXPANDING M TO FEM CONN

## (undated) DEVICE — PACK PROCEDURE SURG GEN CUST

## (undated) DEVICE — ANGIOGRAPHIC CATHETER: Brand: EXPO™

## (undated) DEVICE — INTRODUCER SHTH 6FR L12CM DIA0.038IN HEMSTAS CLOSE TOL

## (undated) DEVICE — ELECTRODE PT RET AD L9FT HI MOIST COND ADH HYDRGEL CORDED

## (undated) DEVICE — NEEDLE FLTR 18GA L1.5IN MEM THK5UM BLNT DISP

## (undated) DEVICE — SYRINGE MED 50ML LUERLOCK TIP

## (undated) DEVICE — BASIC: Brand: MEDLINE INDUSTRIES, INC.

## (undated) DEVICE — LOADING SYS L-EVOLUTFX-34: Brand: EVOLUT™ FX

## (undated) DEVICE — KIT ANGIO W/ AT P65 PREM HND CTRL FOR CNTRST DEL ANGIOTOUCH

## (undated) DEVICE — SET TRNQT W/ STD 7IN 12FR 5 TB 1 SNR DLP

## (undated) DEVICE — Device

## (undated) DEVICE — SOLUTION IV 1000ML 0.9% SOD CHL PH 5 INJ USP VIAFLX PLAS

## (undated) DEVICE — NEEDLE SPNL 20GA L3.5IN YEL HUB S STL REG WALL FIT STYL

## (undated) DEVICE — GLOVE SURG SZ 7.5 L11.73IN FNGR THK9.8MIL STRW LTX POLYMER

## (undated) DEVICE — MEDI-TRACE CADENCE ADULT, PRE-CONNECT  DEFIBRILLATION ELECTRODE (10 PR/PK) - PHYSIO-CONTROL: Brand: MEDI-TRACE CADENCE

## (undated) DEVICE — CANNULA NSL CANN NSL L25FT TBNG AD O2 SUP SFT UC

## (undated) DEVICE — GAUZE,SPONGE,4"X4",8PLY,STRL,LF,10/TRAY: Brand: MEDLINE

## (undated) DEVICE — LARGE BORE STOPCOCK WITH ROTATING MALE LUER LOCK

## (undated) DEVICE — OPTIVIEW, SACRUM, 9"X9": Brand: MEDLINE

## (undated) DEVICE — BAND COMPR L24CM REG CLR PLAS HEMSTAT EXT HK AND LOOP RETEN

## (undated) DEVICE — SOLUTION IV MULT ELECLYT 1000 ML PH 7.4 INJ ISOLYTE S

## (undated) DEVICE — GLOVE SURG SZ 65 THK91MIL LTX FREE SYN POLYISOPRENE

## (undated) DEVICE — PAD, DEFIB, ADULT, RADIOTRAN, PHYSIO, LO: Brand: MEDLINE

## (undated) DEVICE — AMPLATZ EXTRA STIFF WIRE GUIDE: Brand: AMPLATZ

## (undated) DEVICE — DRAPE, MULTI FENESTRATED, HYBRID OR, STE: Brand: MEDLINE

## (undated) DEVICE — ELECTRODE ES AD PED L2.5IN TEF INSUL MOD NONCORDED BLDE TIP

## (undated) DEVICE — CATHETER EP 7FR L115CM 2-6-2MM SPC 22 ELECTRD F CRV

## (undated) DEVICE — MARKER,SKIN,WI/RULER AND LABELS: Brand: MEDLINE

## (undated) DEVICE — BLADE,STAINLESS-STEEL,15,STRL,DISPOSABLE: Brand: MEDLINE

## (undated) DEVICE — PERCUTANEOUS ENTRY THINWALL NEEDLE  ONE-PART: Brand: COOK

## (undated) DEVICE — TOWEL,OR,DSP,ST,BLUE,STD,6/PK,12PK/CS: Brand: MEDLINE

## (undated) DEVICE — DOUBLE BASIN SET: Brand: MEDLINE INDUSTRIES, INC.

## (undated) DEVICE — CATHETER ANGIO 5FR L100CM ID0.045IN AL1 CRV ROBUST SHFT

## (undated) DEVICE — CATHETER DIAG 5FR L100CM LUMN ID0.047IN JR4 CRV 0 SIDE H

## (undated) DEVICE — GLIDESHEATH SLENDER ACCESS KIT: Brand: GLIDESHEATH SLENDER

## (undated) DEVICE — DRAPE,REIN 53X77,STERILE: Brand: MEDLINE

## (undated) DEVICE — LOOP VES W25MM THK1MM MAXI RED SIL FLD REPELLENT 100 PER

## (undated) DEVICE — PATCH REF EXT FOR CARTO 3 SYS (EA = 6 PACKS)

## (undated) DEVICE — BLADE CLIPPER GEN PURP NS

## (undated) DEVICE — 1 X VERSACROSS TRANSSEPTAL SHEATH (INCLUDING  1 X J-TIP GUIDEWIRE); 1 X VERSACROSS RF WIRE (INCLUDING 1 X CONNECTOR CABLE (SINGLE USE)); 1 X DISPERSIVE ELECTRODE: Brand: VERSACROSS ACCESS SOLUTION

## (undated) DEVICE — COVER HNDL LT DISP

## (undated) DEVICE — RADIFOCUS GLIDEWIRE: Brand: GLIDEWIRE

## (undated) DEVICE — SOUNDSTAR REPROC ECO 8F DGNSTC ULTRSND CATH USE GE IMGNG SSTM

## (undated) DEVICE — TRUE™ DILATATION BALLOON VALVULOPLASTY CATHETER, 23 MM X 4.5 CM,110 CM CATHETER: Brand: TRUE DILATATION

## (undated) DEVICE — APPLICATOR MEDICATED 26 CC SOLUTION HI LT ORNG CHLORAPREP

## (undated) DEVICE — DRAPE EQUIP BANDED BG 36X28 IN W/ROUNDED CORNER SNAPKOVER

## (undated) DEVICE — DRESSING TRNSPAR W4XL55IN ACRYL SUP FLM W ADH WTRPRF OPSITE

## (undated) DEVICE — INTRODUCER SHTH 8FR L12CM DBL DST GWIRE L50CM 0.038IN

## (undated) DEVICE — WIPES SKIN CLOTH READYPREP 9 X 10.5 IN 2% CHLORHEX GLUCONATE CHG PREOP

## (undated) DEVICE — GLOVE ORANGE PI 8 1/2   MSG9085

## (undated) DEVICE — KIT MED IMAG CNTRST AGNT W/ IOPAMIDOL REUSE

## (undated) DEVICE — TUBING PMP FOR CARTO SYS SMARTABLATE

## (undated) DEVICE — SET INTRO SHTH 9FR L24CM W/ INTEGR SIDE PRT HEMSTAS VLV 3 W

## (undated) DEVICE — CATHETER PACE 5FR L110CM 6FR INTRO D10CM 1MM SPACE POLYUR

## (undated) DEVICE — ANGIOPLASTY ADD-ON PACK: Brand: MEDLINE INDUSTRIES, INC.

## (undated) DEVICE — SHEATH INTRO 18FR L33CM OD6.7MM ID6MM HYDRPHLC KINK

## (undated) DEVICE — KIT HND CTRL AT-XL65

## (undated) DEVICE — SYRINGE MED 20ML STD CLR PLAS LUERLOCK TIP N CTRL DISP

## (undated) DEVICE — 4-PORT MANIFOLD: Brand: NEPTUNE 2

## (undated) DEVICE — ELECTRODE ELECSURG NDL 2.8 INX7.2 CM COAT INSUL EDGE

## (undated) DEVICE — Device: Brand: TORAYGUIDE GUIDEWIRE

## (undated) DEVICE — BOWL MED L 32OZ PLAS W/ MOLD GRAD EZ OPN PEEL PCH

## (undated) DEVICE — GLIDESHEATH SLENDER NITINOL HYDROPHILIC COATED INTRODUCER SHEATH: Brand: GLIDESHEATH SLENDER

## (undated) DEVICE — TOWEL,OR,DSP,ST,WHITE,DLX,4/PK,20PK/CS: Brand: MEDLINE

## (undated) DEVICE — PICO 7 10CM X 30CM: Brand: PICO™ 7

## (undated) DEVICE — GUIDEWIRE VASC L145CM 0.035IN J TIP L3MM PTFE FIX COR NAMIC

## (undated) DEVICE — COVER,LIGHT HANDLE,FLX,2/PK: Brand: MEDLINE INDUSTRIES, INC.

## (undated) DEVICE — GLOVE ORANGE PI 7 1/2   MSG9075

## (undated) DEVICE — SET INTRO SHTH 5FR L45CM GRY INTEGR SIDE PRT HAEMOSTASIS

## (undated) DEVICE — SHEATH GUID 115X85FR L71MM SM CRV L17MM BIDIR STEER CARTO

## (undated) DEVICE — DELIV SYS D-EVOLUTFX-34: Brand: EVOLUT™ FX

## (undated) DEVICE — GLOVE ORANGE PI 7   MSG9070

## (undated) DEVICE — GOWN,SIRUS,FABRNF,L,20/CS: Brand: MEDLINE

## (undated) DEVICE — GUIDEWIRE VASC L260CM DIA0.035IN TAPR L11CM FLPY TIP L4CM

## (undated) DEVICE — CATHETER DIAG 5FR L110CM PIG CRV SZ 6 SIDE H DBL BRAID WIRE

## (undated) DEVICE — KIT MFLD ISOLATN NACL CNTRST PRT TBNG SPIK W/ PRSS TRNSDUC

## (undated) DEVICE — CATHETER DUAL LUMEN LANGSTON 6F L110CM GWIRE 0.038IN 1000PSI

## (undated) DEVICE — GUIDEWIRE VASCULAR L145CM 0.035IN J TIP L3MM PTFE FIX COR NAMIC

## (undated) DEVICE — CATHETER ABLAT 8FR L115CM 1-6-2MM SPC TIP 3.5MM DF CRV

## (undated) DEVICE — DRAPE SHEET: Brand: UNBRANDED